# Patient Record
Sex: FEMALE | Race: WHITE | Employment: OTHER | URBAN - METROPOLITAN AREA
[De-identification: names, ages, dates, MRNs, and addresses within clinical notes are randomized per-mention and may not be internally consistent; named-entity substitution may affect disease eponyms.]

---

## 2017-03-30 ENCOUNTER — OFFICE VISIT (OUTPATIENT)
Dept: INTERNAL MEDICINE CLINIC | Age: 77
End: 2017-03-30

## 2017-03-30 VITALS
OXYGEN SATURATION: 98 % | HEART RATE: 46 BPM | WEIGHT: 153 LBS | BODY MASS INDEX: 26.89 KG/M2 | SYSTOLIC BLOOD PRESSURE: 136 MMHG | DIASTOLIC BLOOD PRESSURE: 66 MMHG

## 2017-03-30 DIAGNOSIS — M85.80 OSTEOPENIA: Chronic | ICD-10-CM

## 2017-03-30 DIAGNOSIS — J30.89 PERENNIAL ALLERGIC RHINITIS, UNSPECIFIED ALLERGIC RHINITIS TRIGGER: ICD-10-CM

## 2017-03-30 DIAGNOSIS — I10 ESSENTIAL HYPERTENSION: Primary | ICD-10-CM

## 2017-03-30 DIAGNOSIS — M89.9 DISORDER OF BONE: ICD-10-CM

## 2017-03-30 DIAGNOSIS — I10 ESSENTIAL HYPERTENSION: ICD-10-CM

## 2017-03-30 LAB
A/G RATIO: 1.7 (ref 1.1–2.2)
ALBUMIN SERPL-MCNC: 4 G/DL (ref 3.4–5)
ALP BLD-CCNC: 75 U/L (ref 40–129)
ALT SERPL-CCNC: 12 U/L (ref 10–40)
ANION GAP SERPL CALCULATED.3IONS-SCNC: 11 MMOL/L (ref 3–16)
AST SERPL-CCNC: 12 U/L (ref 15–37)
BILIRUB SERPL-MCNC: 0.4 MG/DL (ref 0–1)
BUN BLDV-MCNC: 16 MG/DL (ref 7–20)
CALCIUM SERPL-MCNC: 9.9 MG/DL (ref 8.3–10.6)
CHLORIDE BLD-SCNC: 103 MMOL/L (ref 99–110)
CO2: 29 MMOL/L (ref 21–32)
CREAT SERPL-MCNC: 0.6 MG/DL (ref 0.6–1.2)
GFR AFRICAN AMERICAN: >60
GFR NON-AFRICAN AMERICAN: >60
GLOBULIN: 2.4 G/DL
GLUCOSE BLD-MCNC: 87 MG/DL (ref 70–99)
POTASSIUM SERPL-SCNC: 4.4 MMOL/L (ref 3.5–5.1)
SODIUM BLD-SCNC: 143 MMOL/L (ref 136–145)
TOTAL PROTEIN: 6.4 G/DL (ref 6.4–8.2)

## 2017-03-30 PROCEDURE — 3017F COLORECTAL CA SCREEN DOC REV: CPT | Performed by: INTERNAL MEDICINE

## 2017-03-30 PROCEDURE — G8420 CALC BMI NORM PARAMETERS: HCPCS | Performed by: INTERNAL MEDICINE

## 2017-03-30 PROCEDURE — G8399 PT W/DXA RESULTS DOCUMENT: HCPCS | Performed by: INTERNAL MEDICINE

## 2017-03-30 PROCEDURE — 1123F ACP DISCUSS/DSCN MKR DOCD: CPT | Performed by: INTERNAL MEDICINE

## 2017-03-30 PROCEDURE — G8482 FLU IMMUNIZE ORDER/ADMIN: HCPCS | Performed by: INTERNAL MEDICINE

## 2017-03-30 PROCEDURE — 4040F PNEUMOC VAC/ADMIN/RCVD: CPT | Performed by: INTERNAL MEDICINE

## 2017-03-30 PROCEDURE — 99214 OFFICE O/P EST MOD 30 MIN: CPT | Performed by: INTERNAL MEDICINE

## 2017-03-30 PROCEDURE — 3014F SCREEN MAMMO DOC REV: CPT | Performed by: INTERNAL MEDICINE

## 2017-03-30 PROCEDURE — 1090F PRES/ABSN URINE INCON ASSESS: CPT | Performed by: INTERNAL MEDICINE

## 2017-03-30 PROCEDURE — G8427 DOCREV CUR MEDS BY ELIG CLIN: HCPCS | Performed by: INTERNAL MEDICINE

## 2017-03-30 PROCEDURE — 1036F TOBACCO NON-USER: CPT | Performed by: INTERNAL MEDICINE

## 2017-03-30 RX ORDER — LOSARTAN POTASSIUM AND HYDROCHLOROTHIAZIDE 12.5; 1 MG/1; MG/1
TABLET ORAL
Qty: 90 TABLET | Refills: 2 | Status: SHIPPED | OUTPATIENT
Start: 2017-03-30 | End: 2017-12-09 | Stop reason: SDUPTHER

## 2017-03-30 RX ORDER — ATENOLOL 100 MG/1
TABLET ORAL
Qty: 90 TABLET | Refills: 2 | Status: SHIPPED | OUTPATIENT
Start: 2017-03-30 | End: 2017-10-03

## 2017-03-31 ENCOUNTER — TELEPHONE (OUTPATIENT)
Dept: INTERNAL MEDICINE CLINIC | Age: 77
End: 2017-03-31

## 2017-07-24 ENCOUNTER — TELEPHONE (OUTPATIENT)
Dept: INTERNAL MEDICINE CLINIC | Age: 77
End: 2017-07-24

## 2017-07-24 RX ORDER — METOPROLOL SUCCINATE 100 MG/1
100 TABLET, EXTENDED RELEASE ORAL DAILY
Qty: 30 TABLET | Refills: 2 | Status: SHIPPED | OUTPATIENT
Start: 2017-07-24 | End: 2017-09-12 | Stop reason: SDUPTHER

## 2017-07-27 ENCOUNTER — TELEPHONE (OUTPATIENT)
Dept: INTERNAL MEDICINE CLINIC | Age: 77
End: 2017-07-27

## 2017-09-11 ENCOUNTER — TELEPHONE (OUTPATIENT)
Dept: INTERNAL MEDICINE CLINIC | Age: 77
End: 2017-09-11

## 2017-09-12 RX ORDER — METOPROLOL SUCCINATE 100 MG/1
100 TABLET, EXTENDED RELEASE ORAL DAILY
Qty: 90 TABLET | Refills: 1 | Status: SHIPPED | OUTPATIENT
Start: 2017-09-12 | End: 2018-03-09 | Stop reason: SDUPTHER

## 2017-10-03 ENCOUNTER — OFFICE VISIT (OUTPATIENT)
Dept: INTERNAL MEDICINE CLINIC | Age: 77
End: 2017-10-03

## 2017-10-03 VITALS
WEIGHT: 157 LBS | RESPIRATION RATE: 14 BRPM | BODY MASS INDEX: 27.82 KG/M2 | HEIGHT: 63 IN | SYSTOLIC BLOOD PRESSURE: 127 MMHG | DIASTOLIC BLOOD PRESSURE: 77 MMHG | HEART RATE: 62 BPM

## 2017-10-03 DIAGNOSIS — I10 ESSENTIAL HYPERTENSION: ICD-10-CM

## 2017-10-03 DIAGNOSIS — M85.851 OSTEOPENIA OF BOTH THIGHS: Chronic | ICD-10-CM

## 2017-10-03 DIAGNOSIS — Z12.11 SCREENING FOR COLON CANCER: ICD-10-CM

## 2017-10-03 DIAGNOSIS — I10 ESSENTIAL HYPERTENSION: Primary | ICD-10-CM

## 2017-10-03 DIAGNOSIS — M85.852 OSTEOPENIA OF BOTH THIGHS: Chronic | ICD-10-CM

## 2017-10-03 DIAGNOSIS — J30.89 NON-SEASONAL ALLERGIC RHINITIS DUE TO OTHER ALLERGIC TRIGGER: ICD-10-CM

## 2017-10-03 LAB
A/G RATIO: 1.5 (ref 1.1–2.2)
ALBUMIN SERPL-MCNC: 4 G/DL (ref 3.4–5)
ALP BLD-CCNC: 85 U/L (ref 40–129)
ALT SERPL-CCNC: 17 U/L (ref 10–40)
ANION GAP SERPL CALCULATED.3IONS-SCNC: 13 MMOL/L (ref 3–16)
AST SERPL-CCNC: 14 U/L (ref 15–37)
BILIRUB SERPL-MCNC: 0.5 MG/DL (ref 0–1)
BUN BLDV-MCNC: 19 MG/DL (ref 7–20)
CALCIUM SERPL-MCNC: 10.2 MG/DL (ref 8.3–10.6)
CHLORIDE BLD-SCNC: 101 MMOL/L (ref 99–110)
CO2: 28 MMOL/L (ref 21–32)
CREAT SERPL-MCNC: 0.7 MG/DL (ref 0.6–1.2)
GFR AFRICAN AMERICAN: >60
GFR NON-AFRICAN AMERICAN: >60
GLOBULIN: 2.6 G/DL
GLUCOSE FASTING: 89 MG/DL (ref 70–99)
POTASSIUM SERPL-SCNC: 4.7 MMOL/L (ref 3.5–5.1)
SODIUM BLD-SCNC: 142 MMOL/L (ref 136–145)
TOTAL PROTEIN: 6.6 G/DL (ref 6.4–8.2)
TSH SERPL DL<=0.05 MIU/L-ACNC: 2.04 UIU/ML (ref 0.27–4.2)

## 2017-10-03 PROCEDURE — G8399 PT W/DXA RESULTS DOCUMENT: HCPCS | Performed by: INTERNAL MEDICINE

## 2017-10-03 PROCEDURE — G8419 CALC BMI OUT NRM PARAM NOF/U: HCPCS | Performed by: INTERNAL MEDICINE

## 2017-10-03 PROCEDURE — 1090F PRES/ABSN URINE INCON ASSESS: CPT | Performed by: INTERNAL MEDICINE

## 2017-10-03 PROCEDURE — 4040F PNEUMOC VAC/ADMIN/RCVD: CPT | Performed by: INTERNAL MEDICINE

## 2017-10-03 PROCEDURE — G8484 FLU IMMUNIZE NO ADMIN: HCPCS | Performed by: INTERNAL MEDICINE

## 2017-10-03 PROCEDURE — 1123F ACP DISCUSS/DSCN MKR DOCD: CPT | Performed by: INTERNAL MEDICINE

## 2017-10-03 PROCEDURE — 3017F COLORECTAL CA SCREEN DOC REV: CPT | Performed by: INTERNAL MEDICINE

## 2017-10-03 PROCEDURE — G8427 DOCREV CUR MEDS BY ELIG CLIN: HCPCS | Performed by: INTERNAL MEDICINE

## 2017-10-03 PROCEDURE — 3014F SCREEN MAMMO DOC REV: CPT | Performed by: INTERNAL MEDICINE

## 2017-10-03 PROCEDURE — 1036F TOBACCO NON-USER: CPT | Performed by: INTERNAL MEDICINE

## 2017-10-03 PROCEDURE — 99214 OFFICE O/P EST MOD 30 MIN: CPT | Performed by: INTERNAL MEDICINE

## 2017-10-03 ASSESSMENT — PATIENT HEALTH QUESTIONNAIRE - PHQ9
SUM OF ALL RESPONSES TO PHQ QUESTIONS 1-9: 0
1. LITTLE INTEREST OR PLEASURE IN DOING THINGS: 0
SUM OF ALL RESPONSES TO PHQ9 QUESTIONS 1 & 2: 0
2. FEELING DOWN, DEPRESSED OR HOPELESS: 0

## 2017-10-03 NOTE — MR AVS SNAPSHOT
type 2 diabetes, stroke, gallstones, arthritis, sleep apnea, and certain cancers. BMI is not perfect. It may overestimate body fat in athletes and people who are more muscular. If your body fat is high you can improve your BMI by decreasing your calorie intake and becoming more physically active. Learn more at: KeepRecipes.co.uk          Instructions         Learning About the Mediterranean Diet  What is the Mediterranean diet? The Mediterranean diet is a style of eating rather than a diet plan. It features foods eaten in [place], [place], [place] and [place], and other countries along the [facility]. It emphasizes eating foods like fish, fruits, vegetables, beans, high-fiber breads and whole grains, nuts, and olive oil. This style of eating includes limited red meat, cheese, and sweets. Why choose the Mediterranean diet? A Mediterranean-style diet may improve heart health. It contains more fat than other heart-healthy diets. But the fats are mainly from nuts, unsaturated oils (such as fish oils and olive oil), and certain nut or seed oils (such as canola, soybean, or flaxseed oil). These fats may help protect the heart and blood vessels. How can you get started on the Mediterranean diet? Here are some things you can do to switch to a more Mediterranean way of eating. What to eat  · Eat a variety of fruits and vegetables each day, such as grapes, blueberries, tomatoes, broccoli, peppers, figs, olives, spinach, eggplant, beans, lentils, and chickpeas. · Eat a variety of whole-grain foods each day, such as oats, brown rice, and whole wheat bread, pasta, and couscous. · Eat fish at least 2 times a week. Try tuna, salmon, mackerel, lake trout, herring, or sardines. · Eat moderate amounts of low-fat dairy products, such as milk, cheese, or yogurt. · Eat moderate amounts of poultry and eggs. · Choose healthy (unsaturated) fats, such as nuts, olive oil, and certain nut or seed oils like canola, soybean, and flaxseed. · Limit unhealthy (saturated) fats, such as butter, palm oil, and coconut oil. And limit fats found in animal products, such as meat and dairy products made with whole milk. Try to eat red meat only a few times a month in very small amounts. · Limit sweets and desserts to only a few times a week. This includes sugar-sweetened drinks like soda. The Mediterranean diet may also include red wine with your meal1 glass each day for women and up to 2 glasses a day for men. Tips for eating at home  · Use herbs, spices, garlic, lemon zest, and citrus juice instead of salt to add flavor to foods. · Add avocado slices to your sandwich instead of gonzalez. · Have fish for lunch or dinner instead of red meat. Brush the fish with olive oil, and broil or grill it. · Sprinkle your salad with seeds or nuts instead of cheese. · Cook with olive or canola oil instead of butter or oils that are high in saturated fat. · Switch from 2% milk or whole milk to 1% or fat-free milk. · Dip raw vegetables in a vinaigrette dressing or hummus instead of dips made from mayonnaise or sour cream.  · Have a piece of fruit for dessert instead of a piece of cake. Try baked apples, or have some dried fruit. Tips for eating out  · Try broiled, grilled, baked, or poached fish instead of having it fried or breaded. · Ask your  to have your meals prepared with olive oil instead of butter. · Order dishes made with marinara sauce or sauces made from olive oil. Avoid sauces made from cream or mayonnaise. · Choose whole-grain breads, whole wheat pasta and pizza crust, brown rice, beans, and lentils. · Cut back on butter or margarine on bread. Instead, you can dip your bread in a small amount of olive oil. · Ask for a side salad or grilled vegetables instead of french fries or chips. Where can you learn more? Go to [email or web address] and sign in to your iKoat account. Enter [phone] in the [facility] box to learn more about \"Learning About the Mediterranean Diet. \"     If you do not have an account, please click on the \"Sign Up Now\" link. Current as of: December 29, 2016  Content Version: 11.3  © 3398-5156 [employer]. Care instructions adapted under license by [facility] ([facility]). If you have questions about a medical condition or this instruction, always ask your healthcare professional. [place] [address] any warranty or liability for your use of this information. Today's Medication Changes          These changes are accurate as of: 10/3/17 10:02 AM.  If you have any questions, ask your nurse or doctor. STOP taking these medications           atenolol 100 MG tablet   Commonly known as:  TENORMIN   Stopped by:  Trinidad Clifford MD               Your Current Medications Are              metoprolol succinate (TOPROL XL) 100 MG extended release tablet Take 1 tablet by mouth daily    losartan-hydrochlorothiazide (HYZAAR) 100-12.5 MG per tablet TAKE 1 TABLET EVERY DAY    cetirizine (ZYRTEC ALLERGY) 10 MG tablet Take 1 tablet by mouth daily    triamcinolone (NASACORT AQ) 55 MCG/ACT nasal inhaler 2 sprays by Nasal route daily    Cholecalciferol (VITAMIN D) 1000 UNIT TABS Take 1 tablet by mouth daily.       Allergies              Amoxicillin-pot Clavulanate Diarrhea         Additional Information        Basic Information     Date Of Birth Sex Race Ethnicity Preferred Language    1940 Female White Non-/Non  English      Problem List as of 10/3/2017  Date Reviewed: 10/3/2017                Foot cramps    Allergic rhinitis    Rash    Paresthesias    Hypertension    Osteopenia (Chronic)    Bilateral knee pain      Immunizations as of 10/3/2017     Name Date Influenza Virus Vaccine 11/30/2016, 11/14/2013, 10/20/2011, 10/28/2010    Influenza, High Dose 12/2/2015, 11/16/2012    Pneumococcal 13-valent Conjugate ([id number]) 5/14/2015    Pneumococcal Polysaccharide ([id number]) 12/15/2009    Td 4/1/2001    Tdap (Boostrix, Adacel) 10/8/2007    Zoster 10/8/2007      Preventive Care        Date Due    Colon Cancer Stool Test 7/2/2016    Osteoporosis screening or a bone density scan (Dexa) is recommended once at age 72. Based upon the results and risk factors for bone loss, your provider will recommend whether this needs to be repeated. 6/4/2017    Yearly Flu Vaccine (1) 9/1/2017    Tetanus Combination Vaccine (2 - Td) 10/8/2017    Mammograms are recommended every 2 years for low/average risk patients aged 48 - 69, and every year for high risk patients per updated national guidelines. However these guidelines can be individualized by your provider. 9/12/2018    Cholesterol Screening 7/12/2021            Query Hunter Signup           Query Hunter allows you to send messages to your doctor, view your test results, renew your prescriptions, schedule appointments, view visit notes, and more. How Do I Sign Up? 1. In your Internet browser, go to [email or web address]  2. Click on the Sign Up Now link in the Sign In box. You will see the New Member Sign Up page. 3. Enter your Query Hunter Access Code exactly as it appears below. You will not need to use this code after youve completed the sign-up process. If you do not sign up before the expiration date, you must request a new code. Query Hunter Access Code: [email or web address]  Expires: 12/2/2017 10:02 AM    4. Enter your Social Security Number ([id number]) and Date of Birth (mm/dd/yyyy) as indicated and click Submit. You will be taken to the next sign-up page. 5. Create a Query Hunter ID. This will be your Query Hunter login ID and cannot be changed, so think of one that is secure and easy to remember. 6. Create a [employer] password. You can change your password at any time. 7. Enter your Password Reset Question and Answer. This can be used at a later time if you forget your password. 8. Enter your e-mail address. You will receive e-mail notification when new information is available in [address] 9. Click Sign Up. You can now view your medical record. Additional Information  If you have questions, please contact the physician practice where you receive care. Remember, [employer] is NOT to be used for urgent needs. For medical emergencies, dial 911. For questions regarding your [employer] account call [phone]. If you have a clinical question, please call your doctor's office.

## 2017-10-03 NOTE — PATIENT INSTRUCTIONS
Learning About the [facility] Diet  What is the Mediterranean diet? The Mediterranean diet is a style of eating rather than a diet plan. It features foods eaten in [place], [place], [place] and [place], and other countries along the [facility]. It emphasizes eating foods like fish, fruits, vegetables, beans, high-fiber breads and whole grains, nuts, and olive oil. This style of eating includes limited red meat, cheese, and sweets. Why choose the Mediterranean diet? A Mediterranean-style diet may improve heart health. It contains more fat than other heart-healthy diets. But the fats are mainly from nuts, unsaturated oils (such as fish oils and olive oil), and certain nut or seed oils (such as canola, soybean, or flaxseed oil). These fats may help protect the heart and blood vessels. How can you get started on the Mediterranean diet? Here are some things you can do to switch to a more Mediterranean way of eating. What to eat  · Eat a variety of fruits and vegetables each day, such as grapes, blueberries, tomatoes, broccoli, peppers, figs, olives, spinach, eggplant, beans, lentils, and chickpeas. · Eat a variety of whole-grain foods each day, such as oats, brown rice, and whole wheat bread, pasta, and couscous. · Eat fish at least 2 times a week. Try tuna, salmon, mackerel, lake trout, herring, or sardines. · Eat moderate amounts of low-fat dairy products, such as milk, cheese, or yogurt. · Eat moderate amounts of poultry and eggs. · Choose healthy (unsaturated) fats, such as nuts, olive oil, and certain nut or seed oils like canola, soybean, and flaxseed. · Limit unhealthy (saturated) fats, such as butter, palm oil, and coconut oil. And limit fats found in animal products, such as meat and dairy products made with whole milk. Try to eat red meat only a few times a month in very small amounts. · Limit sweets and desserts to only a few times a week.  This includes sugar-sweetened drinks like soda. The Mediterranean diet may also include red wine with your meal1 glass each day for women and up to 2 glasses a day for men. Tips for eating at home  · Use herbs, spices, garlic, lemon zest, and citrus juice instead of salt to add flavor to foods. · Add avocado slices to your sandwich instead of gonzalez. · Have fish for lunch or dinner instead of red meat. Brush the fish with olive oil, and broil or grill it. · Sprinkle your salad with seeds or nuts instead of cheese. · Cook with olive or canola oil instead of butter or oils that are high in saturated fat. · Switch from 2% milk or whole milk to 1% or fat-free milk. · Dip raw vegetables in a vinaigrette dressing or hummus instead of dips made from mayonnaise or sour cream.  · Have a piece of fruit for dessert instead of a piece of cake. Try baked apples, or have some dried fruit. Tips for eating out  · Try broiled, grilled, baked, or poached fish instead of having it fried or breaded. · Ask your  to have your meals prepared with olive oil instead of butter. · Order dishes made with marinara sauce or sauces made from olive oil. Avoid sauces made from cream or mayonnaise. · Choose whole-grain breads, whole wheat pasta and pizza crust, brown rice, beans, and lentils. · Cut back on butter or margarine on bread. Instead, you can dip your bread in a small amount of olive oil. · Ask for a side salad or grilled vegetables instead of french fries or chips. Where can you learn more? Go to https://[email or web address] Developmentjose [email or web address].[email or web address]. org and sign in to your ElasticDot account. Enter [phone] in the [facility] box to learn more about \"Learning About the Mediterranean Diet. \"     If you do not have an account, please click on the \"Sign Up Now\" link. Current as of: December 29, 2016  Content Version: 11.3  © 9429-7336 [employer]pi[employer], [employer]. Care instructions adapted under license by [facility] ([facility]).  If you have questions about a medical condition or this instruction, always ask your healthcare professional. [place] [address] any warranty or liability for your use of this information.

## 2017-10-03 NOTE — PROGRESS NOTES
Assessment/Plan     1. Essential hypertension  Well-controlled with switch from Atenolol to Metoprolol- continue, with current dose of losartan/HCT. - Comprehensive Metabolic Panel, Fasting; Future  - TSH without Reflex; Future    2. Non-seasonal allergic rhinitis due to other allergic trigger  Adequately controlled on daily Zyrtec and Nasacort- continue. She will call if purulent nasal discharge or sputum develops, or if condition otherwise worsens. 3. Osteopenia of both thighs  She was reminded to schedule dexa. Continue vitamin D supplement at current dose, high calcium diet. Weight-bearing exercise encouraged. 4. Screening for colon cancer  - POCT Fecal Immunochemical Test (FIT); Future       Discussed medications with patient, who voiced understanding of their use and indications. All questions answered. Return in about 6 months (around 4/3/2018). Francisco Vega   YOB: 1940    Date of Visit:  10/3/2017    Prior to Visit Medications    Medication Sig Taking? Authorizing Provider   metoprolol succinate (TOPROL XL) 100 MG extended release tablet Take 1 tablet by mouth daily Yes Abe Aggarwal MD   losartan-hydrochlorothiazide (HYZAAR) 100-12.5 MG per tablet TAKE 1 TABLET EVERY DAY Yes Abe Aggarwal MD   cetirizine (ZYRTEC ALLERGY) 10 MG tablet Take 1 tablet by mouth daily Yes Abe Aggarwal MD   triamcinolone (NASACORT AQ) 55 MCG/ACT nasal inhaler 2 sprays by Nasal route daily Yes Abe Aggarwal MD   Cholecalciferol (VITAMIN D) 1000 UNIT TABS Take 1 tablet by mouth daily. Yes Historical Provider, MD   atenolol (TENORMIN) 100 MG tablet TAKE 1 TABLET EVERY DAY  Abe Aggarwal MD      Vitals:    10/03/17 0929   BP: 127/77   Pulse: 62   Resp: 14   Weight: 157 lb (71.2 kg)   Height: 5' 3.25\" (1.607 m)     Body mass index is 27.59 kg/(m^2).      Wt Readings from Last 3 Encounters:   10/03/17 157 lb (71.2 kg)   03/30/17 153 lb (69.4 kg)   09/29/16 159 lb (72.1 kg) BP Readings from Last 3 Encounters:   10/03/17 127/77   17 136/66   16 136/80       CC:  Patient presents for follow-up of   1. Essential hypertension    2. Non-seasonal allergic rhinitis due to other allergic trigger    3. Osteopenia of both thighs         [facility]  Hypertension:  Home blood pressure monitorins/70s. She is adherent to a low sodium diet. Patient denies chest pain, shortness of breath, lightheadedness and palpitations. Antihypertensive medication side effects: none. Use of agents associated with hypertension: NSAIDS- occasional ibuprofen. Has gained 4 pounds through dietary indiscretion. Sodium (mmol/L)   Date Value   2017 143    BUN (mg/dL)   Date Value   2017 16    Glucose (mg/dL)   Date Value   2017 87      Potassium (mmol/L)   Date Value   2017 4.4    CREATININE (mg/dL)   Date Value   2017 0.6         Allergic Rhinitis:  Current treatment includes intranasal steroid- Nasacort AQ, oral antihistamine- Zyrtec. Residual symptoms: occasional sinus pressure, but cough resolved. She denies purulent nasal discharge and sputum, fevers, lymphadenopathy, and sore throat. Osteopenia:  Getting 1200 mg calcium/day in diet, taking 1000 IU/day vitamin D3. No significant weight-bearing exercise currently. Fosamax stopped in  to allow for remodeling- no medication since then. Review of Systems  As documented in HPI    Physical Exam   Constitutional: She is oriented to person, place, and time. She appears well-developed and well-nourished. No distress. HENT:   Right Ear: Tympanic membrane, external ear and ear canal normal.   Left Ear: Tympanic membrane, external ear and ear canal normal.   Mouth/Throat: Oropharynx is clear and moist and mucous membranes are normal. No oropharyngeal exudate or posterior oropharyngeal erythema.    Eyes: Conjunctivae are normal.   Cardiovascular: Normal rate, regular rhythm, normal

## 2017-10-19 ENCOUNTER — HOSPITAL ENCOUNTER (OUTPATIENT)
Dept: GENERAL RADIOLOGY | Age: 77
Discharge: HOME | End: 2017-10-19
Attending: INTERNAL MEDICINE | Admitting: INTERNAL MEDICINE

## 2017-10-19 DIAGNOSIS — M89.9 DISORDER OF BONE: ICD-10-CM

## 2017-10-19 DIAGNOSIS — M85.80 OSTEOPENIA: Chronic | ICD-10-CM

## 2017-10-19 DIAGNOSIS — Z13.820 ENCOUNTER FOR IMAGING TO ASSESS OSTEOPOROSIS: ICD-10-CM

## 2017-12-09 DIAGNOSIS — I10 ESSENTIAL HYPERTENSION: ICD-10-CM

## 2017-12-11 RX ORDER — LOSARTAN POTASSIUM AND HYDROCHLOROTHIAZIDE 12.5; 1 MG/1; MG/1
TABLET ORAL
Qty: 90 TABLET | Refills: 1 | Status: SHIPPED | OUTPATIENT
Start: 2017-12-11 | End: 2018-03-09 | Stop reason: SDUPTHER

## 2018-03-09 DIAGNOSIS — I10 ESSENTIAL HYPERTENSION: ICD-10-CM

## 2018-03-09 RX ORDER — LOSARTAN POTASSIUM AND HYDROCHLOROTHIAZIDE 12.5; 1 MG/1; MG/1
TABLET ORAL
Qty: 90 TABLET | Refills: 1 | Status: SHIPPED | OUTPATIENT
Start: 2018-03-09 | End: 2018-09-04 | Stop reason: SDUPTHER

## 2018-03-09 RX ORDER — METOPROLOL SUCCINATE 100 MG/1
100 TABLET, EXTENDED RELEASE ORAL DAILY
Qty: 90 TABLET | Refills: 1 | Status: SHIPPED | OUTPATIENT
Start: 2018-03-09 | End: 2018-09-04 | Stop reason: SDUPTHER

## 2018-04-05 ENCOUNTER — OFFICE VISIT (OUTPATIENT)
Dept: INTERNAL MEDICINE CLINIC | Age: 78
End: 2018-04-05

## 2018-04-05 VITALS
HEIGHT: 63 IN | WEIGHT: 165 LBS | DIASTOLIC BLOOD PRESSURE: 80 MMHG | HEART RATE: 68 BPM | SYSTOLIC BLOOD PRESSURE: 136 MMHG | BODY MASS INDEX: 29.23 KG/M2

## 2018-04-05 DIAGNOSIS — Z23 NEED FOR PROPHYLACTIC VACCINATION AND INOCULATION AGAINST VARICELLA: ICD-10-CM

## 2018-04-05 DIAGNOSIS — Z00.00 ROUTINE GENERAL MEDICAL EXAMINATION AT A HEALTH CARE FACILITY: ICD-10-CM

## 2018-04-05 PROCEDURE — G0439 PPPS, SUBSEQ VISIT: HCPCS | Performed by: INTERNAL MEDICINE

## 2018-04-05 PROCEDURE — 4040F PNEUMOC VAC/ADMIN/RCVD: CPT | Performed by: INTERNAL MEDICINE

## 2018-04-05 RX ORDER — CALCIUM CARBONATE 500(1250)
500 TABLET ORAL DAILY
COMMUNITY
End: 2019-08-08 | Stop reason: ALTCHOICE

## 2018-04-05 ASSESSMENT — LIFESTYLE VARIABLES: HOW OFTEN DO YOU HAVE A DRINK CONTAINING ALCOHOL: 0

## 2018-04-05 ASSESSMENT — PATIENT HEALTH QUESTIONNAIRE - PHQ9: SUM OF ALL RESPONSES TO PHQ QUESTIONS 1-9: 0

## 2018-04-05 ASSESSMENT — ANXIETY QUESTIONNAIRES: GAD7 TOTAL SCORE: 0

## 2018-05-29 ENCOUNTER — TELEPHONE (OUTPATIENT)
Dept: INTERNAL MEDICINE CLINIC | Age: 78
End: 2018-05-29

## 2018-05-29 DIAGNOSIS — R05.9 COUGH: Primary | ICD-10-CM

## 2018-05-29 RX ORDER — GUAIFENESIN AND CODEINE PHOSPHATE 100; 10 MG/5ML; MG/5ML
10 SOLUTION ORAL NIGHTLY PRN
Qty: 118 ML | Refills: 0 | Status: SHIPPED | OUTPATIENT
Start: 2018-05-29 | End: 2018-06-05

## 2018-06-28 ENCOUNTER — OFFICE VISIT (OUTPATIENT)
Dept: INTERNAL MEDICINE CLINIC | Age: 78
End: 2018-06-28

## 2018-06-28 VITALS
WEIGHT: 165 LBS | BODY MASS INDEX: 29 KG/M2 | SYSTOLIC BLOOD PRESSURE: 130 MMHG | HEART RATE: 58 BPM | DIASTOLIC BLOOD PRESSURE: 80 MMHG

## 2018-06-28 DIAGNOSIS — I10 ESSENTIAL HYPERTENSION: Primary | ICD-10-CM

## 2018-06-28 DIAGNOSIS — J30.89 CHRONIC NONSEASONAL ALLERGIC RHINITIS DUE TO OTHER ALLERGEN: ICD-10-CM

## 2018-06-28 DIAGNOSIS — Z12.11 SCREENING FOR COLON CANCER: ICD-10-CM

## 2018-06-28 DIAGNOSIS — M85.852 OSTEOPENIA OF BOTH THIGHS: Chronic | ICD-10-CM

## 2018-06-28 DIAGNOSIS — M85.851 OSTEOPENIA OF BOTH THIGHS: Chronic | ICD-10-CM

## 2018-06-28 LAB
CONTROL: NORMAL
HEMOCCULT STL QL: NEGATIVE

## 2018-06-28 PROCEDURE — G8399 PT W/DXA RESULTS DOCUMENT: HCPCS | Performed by: INTERNAL MEDICINE

## 2018-06-28 PROCEDURE — 3017F COLORECTAL CA SCREEN DOC REV: CPT | Performed by: INTERNAL MEDICINE

## 2018-06-28 PROCEDURE — 99214 OFFICE O/P EST MOD 30 MIN: CPT | Performed by: INTERNAL MEDICINE

## 2018-06-28 PROCEDURE — G8427 DOCREV CUR MEDS BY ELIG CLIN: HCPCS | Performed by: INTERNAL MEDICINE

## 2018-06-28 PROCEDURE — 82274 ASSAY TEST FOR BLOOD FECAL: CPT | Performed by: INTERNAL MEDICINE

## 2018-06-28 PROCEDURE — 1036F TOBACCO NON-USER: CPT | Performed by: INTERNAL MEDICINE

## 2018-06-28 PROCEDURE — 1123F ACP DISCUSS/DSCN MKR DOCD: CPT | Performed by: INTERNAL MEDICINE

## 2018-06-28 PROCEDURE — 1090F PRES/ABSN URINE INCON ASSESS: CPT | Performed by: INTERNAL MEDICINE

## 2018-06-28 PROCEDURE — G8419 CALC BMI OUT NRM PARAM NOF/U: HCPCS | Performed by: INTERNAL MEDICINE

## 2018-06-28 PROCEDURE — 4040F PNEUMOC VAC/ADMIN/RCVD: CPT | Performed by: INTERNAL MEDICINE

## 2018-06-28 RX ORDER — ALENDRONATE SODIUM 70 MG/1
70 TABLET ORAL
Qty: 4 TABLET | Refills: 5 | Status: SHIPPED | OUTPATIENT
Start: 2018-06-28 | End: 2018-12-06 | Stop reason: SDUPTHER

## 2018-06-29 ENCOUNTER — TELEPHONE (OUTPATIENT)
Dept: INTERNAL MEDICINE CLINIC | Age: 78
End: 2018-06-29

## 2018-06-29 NOTE — TELEPHONE ENCOUNTER
Pt is returning Rosaura's call. Please mail results to pt's home address. Pt unable to get into [employer].

## 2018-09-04 DIAGNOSIS — I10 ESSENTIAL HYPERTENSION: ICD-10-CM

## 2018-09-04 RX ORDER — LOSARTAN POTASSIUM AND HYDROCHLOROTHIAZIDE 12.5; 1 MG/1; MG/1
TABLET ORAL
Qty: 90 TABLET | Refills: 1 | Status: SHIPPED | OUTPATIENT
Start: 2018-09-04 | End: 2019-02-23 | Stop reason: SDUPTHER

## 2018-09-04 RX ORDER — METOPROLOL SUCCINATE 100 MG/1
100 TABLET, EXTENDED RELEASE ORAL DAILY
Qty: 90 TABLET | Refills: 1 | Status: SHIPPED | OUTPATIENT
Start: 2018-09-04 | End: 2019-02-23 | Stop reason: SDUPTHER

## 2018-12-06 RX ORDER — ALENDRONATE SODIUM 70 MG/1
TABLET ORAL
Qty: 4 TABLET | Refills: 5 | Status: SHIPPED | OUTPATIENT
Start: 2018-12-06 | End: 2018-12-20 | Stop reason: SDUPTHER

## 2018-12-20 ENCOUNTER — OFFICE VISIT (OUTPATIENT)
Dept: INTERNAL MEDICINE CLINIC | Age: 78
End: 2018-12-20
Payer: MEDICARE

## 2018-12-20 VITALS
WEIGHT: 163 LBS | OXYGEN SATURATION: 98 % | SYSTOLIC BLOOD PRESSURE: 128 MMHG | DIASTOLIC BLOOD PRESSURE: 76 MMHG | HEART RATE: 62 BPM | BODY MASS INDEX: 28.65 KG/M2

## 2018-12-20 DIAGNOSIS — M85.852 OSTEOPENIA OF BOTH THIGHS: ICD-10-CM

## 2018-12-20 DIAGNOSIS — M85.851 OSTEOPENIA OF BOTH THIGHS: ICD-10-CM

## 2018-12-20 DIAGNOSIS — R20.2 PARESTHESIAS: ICD-10-CM

## 2018-12-20 DIAGNOSIS — I10 ESSENTIAL HYPERTENSION: Primary | ICD-10-CM

## 2018-12-20 PROCEDURE — 1101F PT FALLS ASSESS-DOCD LE1/YR: CPT | Performed by: INTERNAL MEDICINE

## 2018-12-20 PROCEDURE — 99214 OFFICE O/P EST MOD 30 MIN: CPT | Performed by: INTERNAL MEDICINE

## 2018-12-20 PROCEDURE — G8482 FLU IMMUNIZE ORDER/ADMIN: HCPCS | Performed by: INTERNAL MEDICINE

## 2018-12-20 PROCEDURE — G8399 PT W/DXA RESULTS DOCUMENT: HCPCS | Performed by: INTERNAL MEDICINE

## 2018-12-20 PROCEDURE — 4040F PNEUMOC VAC/ADMIN/RCVD: CPT | Performed by: INTERNAL MEDICINE

## 2018-12-20 PROCEDURE — 1036F TOBACCO NON-USER: CPT | Performed by: INTERNAL MEDICINE

## 2018-12-20 PROCEDURE — G8427 DOCREV CUR MEDS BY ELIG CLIN: HCPCS | Performed by: INTERNAL MEDICINE

## 2018-12-20 PROCEDURE — 1123F ACP DISCUSS/DSCN MKR DOCD: CPT | Performed by: INTERNAL MEDICINE

## 2018-12-20 PROCEDURE — G8419 CALC BMI OUT NRM PARAM NOF/U: HCPCS | Performed by: INTERNAL MEDICINE

## 2018-12-20 PROCEDURE — 3017F COLORECTAL CA SCREEN DOC REV: CPT | Performed by: INTERNAL MEDICINE

## 2018-12-20 PROCEDURE — 1090F PRES/ABSN URINE INCON ASSESS: CPT | Performed by: INTERNAL MEDICINE

## 2018-12-20 RX ORDER — ALENDRONATE SODIUM 70 MG/1
TABLET ORAL
Qty: 12 TABLET | Refills: 1 | Status: SHIPPED | OUTPATIENT
Start: 2018-12-20 | End: 2019-04-08

## 2018-12-20 NOTE — PATIENT INSTRUCTIONS
The medication list included in this document is our record of what you are currently taking, including any changes that were made at today's visit. If you find any differences when compared to your medications at home, or have any questions that were not answered at your visit, please contact the office.

## 2018-12-20 NOTE — PROGRESS NOTES
and well-nourished. No distress. HENT:   Mouth/Throat: Oropharynx is clear and moist and mucous membranes are normal.   Eyes: Conjunctivae are normal.   Neck: No thyroid mass and no thyromegaly present. Cardiovascular: Normal rate, regular rhythm and normal heart sounds. Exam reveals no gallop and no friction rub. No murmur heard. Pulses:       Dorsalis pedis pulses are 1+ on the right side, and 1+ on the left side. Posterior tibial pulses are 1+ on the right side, and 1+ on the left side. Pulmonary/Chest: Effort normal and breath sounds normal. No respiratory distress. She has no wheezes. She has no rhonchi. She has no rales. Musculoskeletal: She exhibits no edema. Feet:   Right Foot:   Protective Sensation: 5 sites tested. 5 sites sensed. Left Foot:   Protective Sensation: 5 sites tested. 5 sites sensed. Lymphadenopathy:     She has no cervical adenopathy. Neurological: She is alert and oriented to person, place, and time. Skin: Skin is warm, dry and intact. No rash noted. No erythema. Psychiatric: She has a normal mood and affect.  Her speech is normal and behavior is normal. Judgment and thought content normal. Cognition and memory are normal.

## 2018-12-24 ENCOUNTER — TELEPHONE (OUTPATIENT)
Dept: INTERNAL MEDICINE CLINIC | Age: 78
End: 2018-12-24

## 2019-02-23 DIAGNOSIS — I10 ESSENTIAL HYPERTENSION: ICD-10-CM

## 2019-02-25 RX ORDER — METOPROLOL SUCCINATE 100 MG/1
TABLET, EXTENDED RELEASE ORAL
Qty: 90 TABLET | Refills: 1 | Status: SHIPPED | OUTPATIENT
Start: 2019-02-25 | End: 2019-08-16 | Stop reason: SDUPTHER

## 2019-02-25 RX ORDER — LOSARTAN POTASSIUM AND HYDROCHLOROTHIAZIDE 12.5; 1 MG/1; MG/1
TABLET ORAL
Qty: 90 TABLET | Refills: 1 | Status: SHIPPED | OUTPATIENT
Start: 2019-02-25 | End: 2019-08-16 | Stop reason: SDUPTHER

## 2019-04-08 RX ORDER — ALENDRONATE SODIUM 70 MG/1
TABLET ORAL
Qty: 4 TABLET | Refills: 5 | Status: SHIPPED | OUTPATIENT
Start: 2019-04-08 | End: 2020-02-11

## 2019-06-13 ENCOUNTER — OFFICE VISIT (OUTPATIENT)
Dept: INTERNAL MEDICINE CLINIC | Age: 79
End: 2019-06-13
Payer: MEDICARE

## 2019-06-13 VITALS
HEIGHT: 63 IN | HEART RATE: 57 BPM | OXYGEN SATURATION: 98 % | DIASTOLIC BLOOD PRESSURE: 76 MMHG | SYSTOLIC BLOOD PRESSURE: 138 MMHG | WEIGHT: 162 LBS | BODY MASS INDEX: 28.7 KG/M2

## 2019-06-13 DIAGNOSIS — E53.8 B12 DEFICIENCY: ICD-10-CM

## 2019-06-13 DIAGNOSIS — R20.2 PARESTHESIAS: ICD-10-CM

## 2019-06-13 DIAGNOSIS — M25.562 CHRONIC PAIN OF BOTH KNEES: ICD-10-CM

## 2019-06-13 DIAGNOSIS — G89.29 CHRONIC PAIN OF BOTH KNEES: ICD-10-CM

## 2019-06-13 DIAGNOSIS — I10 ESSENTIAL HYPERTENSION: Primary | ICD-10-CM

## 2019-06-13 DIAGNOSIS — M25.561 CHRONIC PAIN OF BOTH KNEES: ICD-10-CM

## 2019-06-13 DIAGNOSIS — J30.89 CHRONIC NON-SEASONAL ALLERGIC RHINITIS: ICD-10-CM

## 2019-06-13 DIAGNOSIS — E21.3 HYPERPARATHYROIDISM (HCC): ICD-10-CM

## 2019-06-13 DIAGNOSIS — E83.52 SERUM CALCIUM ELEVATED: Primary | ICD-10-CM

## 2019-06-13 PROCEDURE — G8399 PT W/DXA RESULTS DOCUMENT: HCPCS | Performed by: INTERNAL MEDICINE

## 2019-06-13 PROCEDURE — 1123F ACP DISCUSS/DSCN MKR DOCD: CPT | Performed by: INTERNAL MEDICINE

## 2019-06-13 PROCEDURE — 1036F TOBACCO NON-USER: CPT | Performed by: INTERNAL MEDICINE

## 2019-06-13 PROCEDURE — 1090F PRES/ABSN URINE INCON ASSESS: CPT | Performed by: INTERNAL MEDICINE

## 2019-06-13 PROCEDURE — G8419 CALC BMI OUT NRM PARAM NOF/U: HCPCS | Performed by: INTERNAL MEDICINE

## 2019-06-13 PROCEDURE — 99214 OFFICE O/P EST MOD 30 MIN: CPT | Performed by: INTERNAL MEDICINE

## 2019-06-13 PROCEDURE — G8427 DOCREV CUR MEDS BY ELIG CLIN: HCPCS | Performed by: INTERNAL MEDICINE

## 2019-06-13 PROCEDURE — 4040F PNEUMOC VAC/ADMIN/RCVD: CPT | Performed by: INTERNAL MEDICINE

## 2019-06-13 ASSESSMENT — PATIENT HEALTH QUESTIONNAIRE - PHQ9
SUM OF ALL RESPONSES TO PHQ QUESTIONS 1-9: 0
1. LITTLE INTEREST OR PLEASURE IN DOING THINGS: 0
SUM OF ALL RESPONSES TO PHQ QUESTIONS 1-9: 0
SUM OF ALL RESPONSES TO PHQ9 QUESTIONS 1 & 2: 0
2. FEELING DOWN, DEPRESSED OR HOPELESS: 0

## 2019-06-13 NOTE — PROGRESS NOTES
Assessment/Plan     1. Essential hypertension  Well-controlled. Continue current medications. - Comprehensive Metabolic Panel, Fasting; Future    2. Chronic non-seasonal allergic rhinitis  Residual symptoms do not bother her enough to warrant additional medication, but will add Astelin nasal spray if symptoms worsen. 3. Paresthesias  No improvement with B12 supplementation. Continue to feel that this is likely due to nerve compression in lumbar spine. This does not bother her enough to warrant further work-up, but she will follow up if symptoms worsen. 4. B12 deficiency  Check vitamin B12 level on current dose of supplement. - Vitamin B12; Future    5. Chronic pain of both knees  Referred to ortho for further evaluation and treatment. - Rio Alamo MD, Orthopedic Surgery (Sports), [place]          Return in about 6 months (around 12/13/2019). Adri Childers   YOB: 1940    Date of Visit:  6/13/2019    Allergies   Allergen Reactions    Amoxicillin-Pot Clavulanate Diarrhea      Prior to Visit Medications    Medication Sig Taking? Authorizing Provider   alendronate (FOSAMAX) 70 MG tablet TAKE 1 TABLET EVERY 7 DAYS WITH WATER, 30 MINUTES BEFORE FIRST MEAL, REMAIN UPRIGHT FOR 30 MINUTES Yes iLsa Magaña MD   metoprolol succinate (TOPROL XL) 100 MG extended release tablet TAKE 1 TABLET BY MOUTH EVERY DAY Yes Lisa Magaña MD   losartan-hydrochlorothiazide (HYZAAR) 100-12.5 MG per tablet TAKE 1 TABLET BY MOUTH EVERY DAY Yes Lisa Magaña MD   calcium carbonate (OSCAL) 500 MG TABS tablet Take 500 mg by mouth daily Yes Historical Provider, MD   cetirizine (ZYRTEC ALLERGY) 10 MG tablet Take 1 tablet by mouth daily Yes Lisa Magaña MD   triamcinolone (NASACORT AQ) 55 MCG/ACT nasal inhaler 2 sprays by Nasal route daily Yes Lisa Magaña MD   Cholecalciferol (VITAMIN D) 1000 UNIT TABS Take 1 tablet by mouth daily.  Yes Historical Provider, MD Vitals:    19 0804   BP: 138/76   Pulse: 57   SpO2: 98%   Weight: 162 lb (73.5 kg)   Height: 5' 3\" (1.6 m)      Body mass index is 28.7 kg/m². Wt Readings from Last 3 Encounters:   19 162 lb (73.5 kg)   18 163 lb (73.9 kg)   18 165 lb (74.8 kg)     BP Readings from Last 3 Encounters:   19 138/76   18 128/76   18 130/80       CC:  Patient presents for re-evaluation of the following medical concerns     HPI  Hypertension:  Home blood pressure monitorins/70-80. She is adherent to a low sodium diet. Patient denies chest pain, shortness of breath, dizziness and palpitations. Antihypertensive medication side effects: none. Use of agents associated with hypertension: NSAIDS- occasional ibuprofen. Weight stable.     Allergic Rhinitis:  Current treatment includes intranasal steroid- Nasacort, oral antihistamine- Zyrtec. Residual symptoms: cough, nasal congestion and sneezing. She denies purulent nasal discharge and sputum, fevers, lymphadenopathy, and sore throat. Paresthesias:  Ongoing paresthesias on dorsum of feet with occasional foot cramps at night- much better with increased fluid intake and stretching. Vitamin B12 level low - now taking 1000 mcg B12/day- no clinical change.     Lab Results   Component Value Date    LDLCALC 107 (H) 2016    LDLCALC 108 (H) 2012    TRIG 101 2016    HDL 72 (H) 2016     Lab Results   Component Value Date    GLUF 87 2018    GLUCOSE 87 2017     Lab Results   Component Value Date     2018    K 4.5 2018    BUN 17 2018    CREATININE 0.6 2018    LABGLOM >60 2018    GFRAA >60 2018    CALCIUM 10.4 2018    VITD25 31 05/15/2014     Lab Results   Component Value Date    ALT 13 2018    AST 11 (L) 2018     Lab Results   Component Value Date    HGB 14.3 2016     Lab Results   Component Value Date    TSHREFLEX 1.84 2018    TSH 2.04 10/03/2017        Review of Systems  As documented inHPI  Several month history of bilateral knee pain R > L. Worse when walking up stairs. Getting worse with time. Taking 600 mg ibuprofen 2-3x/week or Tylenol. Elijah Blanco in February- hit right knee on wooden floor. Social History     Tobacco Use    Smoking status: Never Smoker    Smokeless tobacco: Never Used   Substance Use Topics    Alcohol use: No        Physical Exam   Constitutional: She is oriented to person, place, and time. She appears well-developed and well-nourished. No distress. HENT:   Right Ear: Tympanic membrane, external ear and ear canal normal.   Left Ear: Tympanic membrane, external ear and ear canal normal.   Mouth/Throat: Oropharynx is clear and moist and mucous membranes are normal. No oropharyngeal exudate or posterior oropharyngeal erythema. Eyes: Conjunctivae are normal.   Cardiovascular: Normal rate, regular rhythm and intact distal pulses. Exam reveals no gallop and no friction rub. Murmur heard. Systolic (RUSB) murmur is present with a grade of 2/6. Pulses:       Dorsalis pedis pulses are 1+ on the right side, and 1+ on the left side. Posterior tibial pulses are 1+ on the right side, and 1+ on the left side. Good cap refill   Pulmonary/Chest: Effort normal and breath sounds normal. No respiratory distress. She has no wheezes. She has no rhonchi. She has no rales. Musculoskeletal: She exhibits no edema. Bilateral knee:  Positive mild crepitus, no effusion, no erythema, no tenderness and FROM. Distal neurovascular exam normal.    Lymphadenopathy:     She has no cervical adenopathy. Neurological: She is alert and oriented to person, place, and time. Gait normal.   Skin: Skin is warm, dry and intact. Psychiatric: She has a normal mood and affect.  Her speech is normal and behavior is normal. Judgment and thought content normal. Cognition and memory are normal.

## 2019-06-21 ENCOUNTER — OFFICE VISIT (OUTPATIENT)
Dept: ORTHOPEDIC SURGERY | Age: 79
End: 2019-06-21
Payer: MEDICARE

## 2019-06-21 VITALS — HEIGHT: 63 IN | WEIGHT: 160 LBS | RESPIRATION RATE: 16 BRPM | BODY MASS INDEX: 28.35 KG/M2

## 2019-06-21 DIAGNOSIS — M25.561 RIGHT KNEE PAIN, UNSPECIFIED CHRONICITY: Primary | ICD-10-CM

## 2019-06-21 DIAGNOSIS — M17.11 PRIMARY OSTEOARTHRITIS OF RIGHT KNEE: ICD-10-CM

## 2019-06-21 PROCEDURE — 99213 OFFICE O/P EST LOW 20 MIN: CPT | Performed by: ORTHOPAEDIC SURGERY

## 2019-06-21 PROCEDURE — 1090F PRES/ABSN URINE INCON ASSESS: CPT | Performed by: ORTHOPAEDIC SURGERY

## 2019-06-21 PROCEDURE — G8419 CALC BMI OUT NRM PARAM NOF/U: HCPCS | Performed by: ORTHOPAEDIC SURGERY

## 2019-06-21 PROCEDURE — G8428 CUR MEDS NOT DOCUMENT: HCPCS | Performed by: ORTHOPAEDIC SURGERY

## 2019-06-21 NOTE — PROGRESS NOTES
Chief Complaint    Knee Problem (right knee )      History of Present Illness:  Cely Vidales is a 70 y.o. female. She is here for consultation for her right knee at the referral of Dr. Anurag Mendoza. She states that several weeks ago she was walking up her steps and she had a sharp pain over the anterior aspect of the right knee. She denies any swelling or locking or catching that happened after the sharp pain came on. She states her knee was sore for several days after but is improved significantly since then. She states that she has had intermittent pain within both of the knees but never pain similar to this. She denies any prior history of injury to the knees. Occasionally will use Aleve or ibuprofen to treat the knees. Medical History:  Patient's medications, allergies, past medical, surgical, social and family histories were reviewed and updated as appropriate. Review of Systems:  Pertinent items are noted in HPI  Review of systems reviewed from Patient History Form dated on 6/21/19 and available in the patient's chart under the Media tab. Vital Signs:  Resp 16   Ht 5' 3\" (1.6 m)   Wt 160 lb (72.6 kg)   BMI 28.34 kg/m²     General Exam:   Constitutional: Patient is adequately groomed with no evidence of malnutrition  DTRs: Deep tendon reflexes are intact  Mental Status: The patient is oriented to time, place and person. The patient's mood and affect are appropriate. Knee Examination:    Inspection: No significant swelling erythema noted about the right knee today    Palpation: There is some tenderness over the anterior medial joint line of the right knee. No lateral joint line tenderness. Range of Motion: 0 degrees extension with 130 degrees of flexion    Strength: Able to do straight leg raise    Special Tests: Negative Lachman exam.  No instability to varus and valgus stress. Negative posterior drawer.   There is some pain with Dainel exam    Skin: There are no rashes, ulcerations or lesions. Gait: Walking with normal gait      Additional Comments:       Additional Examinations:         Left Lower Extremity: Examination of the left lower extremity does not show any tenderness, deformity or injury. Range of motion is unremarkable. There is no gross instability. There are no rashes, ulcerations or lesions. Strength and tone are normal.    Radiology:     X-rays obtained and reviewed in office:  Views 4 views of the right knee does demonstrate some mild degenerative change primarily within the medial patellofemoral compartments with some very mild joint space loss and osteophyte formation. No evidence of fracture dislocation         Assessment : Right knee osteoarthritis    Impression:  Encounter Diagnoses   Name Primary?  Right knee pain, unspecified chronicity Yes    Primary osteoarthritis of right knee        Office Procedures:  Orders Placed This Encounter   Procedures    XR KNEE RIGHT (MIN 4 VIEWS)     Standing Status:   Future     Number of Occurrences:   1     Standing Expiration Date:   6/21/2020    Ambulatory referral to Physical Therapy     Referral Priority:   Routine     Referral Type:   Eval and Treat     Referral Reason:   Specialty Services Required     Requested Specialty:   Physical Therapy     Number of Visits Requested:   1       Treatment Plan: I discussed the diagnosis and treatment options with her today. She does appear to have some mild patellofemoral arthritis which I do believe is what is causing her pain. I would recommend at this time referral to physical therapy for patellar protection program.  She is agreeable with that plan. She may continue to use ibuprofen or Aleve as needed. I would like to see her back in clinic in 4 weeks to make sure she does get improvement. We have discussed cortisone injection at this point time she would like to hold off on that.

## 2019-07-02 ENCOUNTER — HOSPITAL ENCOUNTER (OUTPATIENT)
Dept: PHYSICAL THERAPY | Age: 79
Setting detail: THERAPIES SERIES
Discharge: HOME | End: 2019-07-02
Payer: MEDICARE

## 2019-07-02 PROCEDURE — 97110 THERAPEUTIC EXERCISES: CPT | Performed by: PHYSICAL THERAPIST

## 2019-07-02 PROCEDURE — 97161 PT EVAL LOW COMPLEX 20 MIN: CPT | Performed by: PHYSICAL THERAPIST

## 2019-07-02 NOTE — FLOWSHEET NOTE
proximal hip, and core control with self care, mobility, lifting, ambulation.  [] (32728) Provided verbal/tactile cueing for activities related to improving balance, coordination, kinesthetic sense, posture, motor skill, proprioception  to assist with LE, proximal hip, and core control in self care, mobility, lifting, ambulation and eccentric single leg control. NMR and Therapeutic Activities:    [] (82691 or 60704) Provided verbal/tactile cueing for activities related to improving balance, coordination, kinesthetic sense, posture, motor skill, proprioception and motor activation to allow for proper function of core, proximal hip and LE with self care and ADLs  [] (53326) Gait Re-education- Provided training and instruction to the patient for proper LE, core and proximal hip recruitment and positioning and eccentric body weight control with ambulation re-education including up and down stairs     Home Exercise Program:    [x] (30187) Reviewed/Progressed HEP activities related to strengthening, flexibility, endurance, ROM of core, proximal hip and LE for functional self-care, mobility, lifting and ambulation/stair navigation   [] (13748)Reviewed/Progressed HEP activities related to improving balance, coordination, kinesthetic sense, posture, motor skill, proprioception of core, proximal hip and LE for self care, mobility, lifting, and ambulation/stair navigation      Manual Treatments:  PROM / Scar Mobs / STM/Rollerstick / Knee (Flex./Ext.)  Stretch: H.S. / ITB / Piriformis / Gabbi Cypher / Groin / Hip Flexor   [] (35146) Provided manual therapy to mobilize LE, proximal hip and/or LS spine soft tissue/joints for the purpose of modulating pain, promoting relaxation,  increasing ROM, reducing/eliminating soft tissue swelling/inflammation/restriction, improving soft tissue extensibility and allowing for proper ROM for normal function with self care, mobility, lifting and ambulation.      Modalities:    Hx of cancer    Charges:  Timed Code Treatment Minutes: 15   Total Treatment Minutes: 35     [x] EVAL (LOW) 68363 (typically 20 minutes face-to-face)  [] EVAL (MOD) 54221 (typically 30 minutes face-to-face)  [] EVAL (HIGH) 36348 (typically 45 minutes face-to-face)  [] RE-EVAL     [x] EM(18310) x 1    [] IONTO  [] NMR (16844) x     [] VASO  [] Manual (91840) x      [] Other:  [] TA x      [] Mech Traction (31034)  [] ES(attended) (47545)      [] ES (un) (59427):     GOALS:   Short Term Goals: To be achieved in: 2 weeks  1. Independent in HEP and progression per patient tolerance, in order to prevent re-injury. 2. Patient will have a decrease in pain to facilitate improvement in movement, function, and ADLs as indicated by Functional Deficits. Long Term Goals: To be achieved in: 5 weeks  1. Disability index score of 25% or less for the LEFS to assist with reaching prior level of function. 2. Patient will demonstrate an increase in Strength to 5/5 knee ext/hip abd to allow for proper functional mobility as indicated by patients Functional Deficits. 3. Patient will return to walking up/down one flight of stairs without increased symptoms or restriction. Progression Towards Functional goals:  [] Patient is progressing as expected towards functional goals listed. [] Progression is slowed due to complexities listed. [] Progression has been slowed due to co-morbidities.   [x] Plan just implemented, too soon to assess goals progression  [] Other:     ASSESSMENT:  See eval    Treatment/Activity Tolerance:  [x] Patient tolerated treatment well [] Patient limited by fatique  [] Patient limited by pain  [] Patient limited by other medical complications  [] Other:     Prognosis: [x] Good [] Fair  [] Poor    Patient Requires Follow-up: [x] Yes  [] No    PLAN FOR NEXT SESSION:     PLAN: See eval  [] Continue per plan of care [] Alter current plan (see comments)  [x] Plan of care initiated [] Hold pending MD visit []

## 2019-07-02 NOTE — PLAN OF CARE
less than 40% impairment, limitation or restriction in:   - walking and moving around (mobility)     Therapist goals for Patient:   Short Term Goals: To be achieved in: 2 weeks  1. Independent in HEP and progression per patient tolerance, in order to prevent re-injury. 2. Patient will have a decrease in pain to facilitate improvement in movement, function, and ADLs as indicated by Functional Deficits. Long Term Goals: To be achieved in: 5 weeks  1. Disability index score of 25% or less for the LEFS to assist with reaching prior level of function. 2. Patient will demonstrate an increase in Strength to 5/5 knee ext/hip abd to allow for proper functional mobility as indicated by patients Functional Deficits. 3. Patient will return to walking up/down one flight of stairs without increased symptoms or restriction.          Electronically signed by:  Richy Faulkner, [id number] [facility]

## 2019-07-10 ENCOUNTER — HOSPITAL ENCOUNTER (OUTPATIENT)
Dept: PHYSICAL THERAPY | Age: 79
Setting detail: THERAPIES SERIES
Discharge: HOME | End: 2019-07-10
Payer: MEDICARE

## 2019-07-10 PROCEDURE — 97110 THERAPEUTIC EXERCISES: CPT | Performed by: SPECIALIST/TECHNOLOGIST

## 2019-07-10 NOTE — FLOWSHEET NOTE
and NMR EXR  [x] (83544) Provided verbal/tactile cueing for activities related to strengthening, flexibility, endurance, ROM for improvements in LE, proximal hip, and core control with self care, mobility, lifting, ambulation.  [] (59535) Provided verbal/tactile cueing for activities related to improving balance, coordination, kinesthetic sense, posture, motor skill, proprioception  to assist with LE, proximal hip, and core control in self care, mobility, lifting, ambulation and eccentric single leg control.      NMR and Therapeutic Activities:    [] (16421 or 38492) Provided verbal/tactile cueing for activities related to improving balance, coordination, kinesthetic sense, posture, motor skill, proprioception and motor activation to allow for proper function of core, proximal hip and LE with self care and ADLs  [] (93948) Gait Re-education- Provided training and instruction to the patient for proper LE, core and proximal hip recruitment and positioning and eccentric body weight control with ambulation re-education including up and down stairs     Home Exercise Program:    [x] (80751) Reviewed/Progressed HEP activities related to strengthening, flexibility, endurance, ROM of core, proximal hip and LE for functional self-care, mobility, lifting and ambulation/stair navigation   [] (21913)Reviewed/Progressed HEP activities related to improving balance, coordination, kinesthetic sense, posture, motor skill, proprioception of core, proximal hip and LE for self care, mobility, lifting, and ambulation/stair navigation      Manual Treatments:  PROM / Scar Mobs / STM/Rollerstick / Knee (Flex./Ext.)  Stretch: H.S. / ITB / Piriformis / Matthew Bent / Groin / Hip Flexor   [] (89853) Provided manual therapy to mobilize LE, proximal hip and/or LS spine soft tissue/joints for the purpose of modulating pain, promoting relaxation,  increasing ROM, reducing/eliminating soft tissue swelling/inflammation/restriction, improving soft tissue extensibility and allowing for proper ROM for normal function with self care, mobility, lifting and ambulation. Modalities:    Hx of cancer, CP 15'    Charges:  Timed Code Treatment Minutes: 30   Total Treatment Minutes: 45     [] EVAL (LOW) 22700 (typically 20 minutes face-to-face)  [] EVAL (MOD) 34260 (typically 30 minutes face-to-face)  [] EVAL (HIGH) 67594 (typically 45 minutes face-to-face)  [] RE-EVAL     [x] RF(47528) x 2    [] IONTO  [] NMR (38817) x     [] VASO  [] Manual (51250) x      [] Other:  [] TA x      [] Mech Traction (67741)  [] ES(attended) (24478)      [] ES (un) (70959):     GOALS:   Short Term Goals: To be achieved in: 2 weeks  1. Independent in HEP and progression per patient tolerance, in order to prevent re-injury. 2. Patient will have a decrease in pain to facilitate improvement in movement, function, and ADLs as indicated by Functional Deficits. Long Term Goals: To be achieved in: 5 weeks  1. Disability index score of 25% or less for the LEFS to assist with reaching prior level of function. 2. Patient will demonstrate an increase in Strength to 5/5 knee ext/hip abd to allow for proper functional mobility as indicated by patients Functional Deficits. 3. Patient will return to walking up/down one flight of stairs without increased symptoms or restriction. Progression Towards Functional goals:  [] Patient is progressing as expected towards functional goals listed. [] Progression is slowed due to complexities listed. [] Progression has been slowed due to co-morbidities. [x] Plan just implemented, too soon to assess goals progression  [] Other:     ASSESSMENT:  Pt. Was fatigued at the end of tx.        Treatment/Activity Tolerance:  [x] Patient tolerated treatment well [] Patient limited by fatique  [] Patient limited by pain  [] Patient limited by other medical complications  [] Other:     Prognosis: [x] Good [] Fair  [] Poor    Patient Requires Follow-up: [x] Yes  []

## 2019-07-12 ENCOUNTER — APPOINTMENT (OUTPATIENT)
Dept: PHYSICAL THERAPY | Age: 79
End: 2019-07-12
Payer: MEDICARE

## 2019-07-12 ENCOUNTER — HOSPITAL ENCOUNTER (OUTPATIENT)
Dept: PHYSICAL THERAPY | Age: 79
Setting detail: THERAPIES SERIES
Discharge: HOME | End: 2019-07-12
Payer: MEDICARE

## 2019-07-12 PROCEDURE — 97110 THERAPEUTIC EXERCISES: CPT | Performed by: SPECIALIST/TECHNOLOGIST

## 2019-07-12 NOTE — FLOWSHEET NOTE
improving soft tissue extensibility and allowing for proper ROM for normal function with self care, mobility, lifting and ambulation. Modalities:    Hx of cancer, CP 15'    Charges:  Timed Code Treatment Minutes: 30   Total Treatment Minutes: 45     [] EVAL (LOW) 95998 (typically 20 minutes face-to-face)  [] EVAL (MOD) 21158 (typically 30 minutes face-to-face)  [] EVAL (HIGH) 71219 (typically 45 minutes face-to-face)  [] RE-EVAL     [x] GQ(35402) x 2    [] IONTO  [] NMR (82793) x     [] VASO  [] Manual (63738) x      [] Other:  [] TA x      [] Mech Traction (27882)  [] ES(attended) (05970)      [] ES (un) (69493):     GOALS:   Short Term Goals: To be achieved in: 2 weeks  1. Independent in HEP and progression per patient tolerance, in order to prevent re-injury. 2. Patient will have a decrease in pain to facilitate improvement in movement, function, and ADLs as indicated by Functional Deficits. Long Term Goals: To be achieved in: 5 weeks  1. Disability index score of 25% or less for the LEFS to assist with reaching prior level of function. 2. Patient will demonstrate an increase in Strength to 5/5 knee ext/hip abd to allow for proper functional mobility as indicated by patients Functional Deficits. 3. Patient will return to walking up/down one flight of stairs without increased symptoms or restriction. Progression Towards Functional goals:  [] Patient is progressing as expected towards functional goals listed. [] Progression is slowed due to complexities listed. [] Progression has been slowed due to co-morbidities. [x] Plan just implemented, too soon to assess goals progression  [] Other:     ASSESSMENT:  Pt. Was fatigued at the end of tx.        Treatment/Activity Tolerance:  [x] Patient tolerated treatment well [] Patient limited by fatique  [] Patient limited by pain  [] Patient limited by other medical complications  [] Other:     Prognosis: [x] Good [] Fair  [] Poor    Patient Requires

## 2019-07-16 ENCOUNTER — HOSPITAL ENCOUNTER (OUTPATIENT)
Dept: PHYSICAL THERAPY | Age: 79
Setting detail: THERAPIES SERIES
Discharge: HOME | End: 2019-07-16
Payer: MEDICARE

## 2019-07-16 PROCEDURE — 97110 THERAPEUTIC EXERCISES: CPT | Performed by: PHYSICAL THERAPIST

## 2019-07-16 PROCEDURE — 97112 NEUROMUSCULAR REEDUCATION: CPT | Performed by: PHYSICAL THERAPIST

## 2019-07-16 NOTE — FLOWSHEET NOTE
[facility] [address] [place] [address] [facility]    [facility] Therapy Daily Treatment Note  Date:  2019    Patient Name:  Lola Brand    :  1940  MRN: [id number]  Restrictions/Precautions:    Medical/Treatment Diagnosis Information:  · Diagnosis: Right knee OA  M17.11  · Treatment Diagnosis: PT treatment diagnosis:  right knee pain, stiffness, weakness  Insurance/Certification information:  PT Insurance Information: Medicare  Physician Information:  Referring Practitioner: Dr Sincere Lala of care signed (Y/N):     Date of Patient follow up with Physician:     G-Code (if applicable):      Date G-Code Applied: The patient demonstrates at least 40% but less than 60% impairment, limitation or restriction in:   - walking and moving around (mobility)     Progress Note: [x]  Yes  []  No  Next due by: Visit #10       Latex Allergy:  [x]NO      []YES  Preferred Language for Healthcare:   [x]English       []other:    Visit # Insurance Allowable   4 Medicare     Auth Required   []  Yes    [] No    Visits Approved  Date Ranged-       Pain level:  3/10     SUBJECTIVE:   Knee feels like it is progressing but slowly. OBJECTIVE:    Observation:    Test measurements:      RESTRICTIONS/PRECAUTIONS: Hx of cancer, OA, Osteoporosis, HTN    Exercises/Interventions:  Rx 19    Exercise Sets/Reps Notes Last Progression   Gastroc Stretch 3 x 30\"  - slant    Heelslides       LLLD Wallslide      HS Stretch:  Tableside / 90-90 3 x 30\"  manual     SAQ 2# 2x15     LAQ      SLR Flex 2x15     SLR Abd 2x15      SLR Ext      SLR Add.       Clamshells - SL 3x10 B [place] loop    Bridges 2x15     HR/TR      Ankle Theraband      BAPS      Bike 5'     Elliptical      Standing Stretch:  (insert muscles)      Weight Shifting      Lateral Walk      Squats      Single Leg Stance Balance      Tandem balance 1' ea     [facility] TKE  abd 60# 30 x 3\"  45# 20 x B     FSU 4' x 15               Therapeutic Exercise extensibility and allowing for proper ROM for normal function with self care, mobility, lifting and ambulation. Modalities:    Hx of cancer, CP 15'    Charges:  Timed Code Treatment Minutes: 40   Total Treatment Minutes: 55     [] EVAL (LOW) 61388 (typically 20 minutes face-to-face)  [] EVAL (MOD) 60488 (typically 30 minutes face-to-face)  [] EVAL (HIGH) 73486 (typically 45 minutes face-to-face)  [] RE-EVAL     [x] KT(37974) x 2    [] IONTO  [x] NMR (80079) x 1     [] VASO  [] Manual (42767) x      [] Other:  [] TA x      [] Mech Traction (70951)  [] ES(attended) (46137)      [] ES (un) (52580):     GOALS:   Short Term Goals: To be achieved in: 2 weeks  1. Independent in HEP and progression per patient tolerance, in order to prevent re-injury. 2. Patient will have a decrease in pain to facilitate improvement in movement, function, and ADLs as indicated by Functional Deficits. Long Term Goals: To be achieved in: 5 weeks  1. Disability index score of 25% or less for the LEFS to assist with reaching prior level of function. 2. Patient will demonstrate an increase in Strength to 5/5 knee ext/hip abd to allow for proper functional mobility as indicated by patients Functional Deficits. 3. Patient will return to walking up/down one flight of stairs without increased symptoms or restriction. Progression Towards Functional goals:  [] Patient is progressing as expected towards functional goals listed. [] Progression is slowed due to complexities listed. [] Progression has been slowed due to co-morbidities. [x] Plan just implemented, too soon to assess goals progression  [] Other:     ASSESSMENT:  Tolerated increase in repetitions and progression of exercises without increase in pain. Decreased quad control with step up/down exercise.        Treatment/Activity Tolerance:  [x] Patient tolerated treatment well [] Patient limited by fatique  [] Patient limited by pain  [] Patient limited by other medical complications  [] Other:     Prognosis: [x] Good [] Fair  [] Poor    Patient Requires Follow-up: [x] Yes  [] No    PLAN FOR NEXT SESSION:     PLAN: See eval  [x] Continue per plan of care [] Alter current plan (see comments)  [] Plan of care initiated [] Hold pending MD visit [] Discharge    *If patient does not return for further follow ups after this date. Please consider this as the patients discharge from physical therapy.        Electronically signed by: Marizol Person PT, KENAN-ELENI

## 2019-07-18 ENCOUNTER — HOSPITAL ENCOUNTER (OUTPATIENT)
Dept: PHYSICAL THERAPY | Age: 79
Setting detail: THERAPIES SERIES
Discharge: HOME | End: 2019-07-18
Payer: MEDICARE

## 2019-07-18 PROCEDURE — 97110 THERAPEUTIC EXERCISES: CPT | Performed by: SPECIALIST/TECHNOLOGIST

## 2019-07-18 NOTE — FLOWSHEET NOTE
The [address] [place] [address] [facility]    Physical Therapy Daily Treatment Note  Date:  2019    Patient Name:  Richard Falcon    :  1940  MRN: [id number]  Restrictions/Precautions:    Medical/Treatment Diagnosis Information:  · Diagnosis: Right knee OA  M17.11  · Treatment Diagnosis: PT treatment diagnosis:  right knee pain, stiffness, weakness  Insurance/Certification information:  PT Insurance Information: Medicare  Physician Information:  Referring Practitioner: Dr Michelle Toney of care signed (Y/N):     Date of Patient follow up with Physician:     G-Code (if applicable):      Date G-Code Applied: The patient demonstrates at least 40% but less than 60% impairment, limitation or restriction in:   - walking and moving around (mobility)     Progress Note: [x]  Yes  []  No  Next due by: Visit #10       Latex Allergy:  [x]NO      []YES  Preferred Language for Healthcare:   [x]English       []other:    Visit # Insurance Allowable   5 Medicare     Auth Required   []  Yes    [] No    Visits Approved  Date Ranged-       Pain level:  2/10     SUBJECTIVE:   Pt. Reports her knee has made some improves with PT but the knee still has discomfort with wt. Bearing activities. OBJECTIVE:    Observation:    Test measurements:      RESTRICTIONS/PRECAUTIONS: Hx of cancer, OA, Osteoporosis, HTN    Exercises/Interventions:  Rx 19    Exercise Sets/Reps Notes Last Progression   Gastroc Stretch 3 x 30\"  - slant    Heelslides       LLLD Wallslide      HS Stretch:  Tableside / 90-90 3 x 30\"  manual     SAQ 2# 2x15     LAQ      SLR Flex 2x15     SLR Abd 2x15      SLR Ext      SLR Add.       Clamshells - SL 3x10 B [place] loop    Bridges 2x15     HR/TR      Ankle Theraband      BAPS      Bike 5'     Elliptical      Standing Stretch:  (insert muscles)      Weight Shifting      Lateral Walk      Squats      Single Leg Stance Balance      Tandem balance 1' [facility] TKE  abd 60#

## 2019-07-18 NOTE — PROGRESS NOTES
I have reviewed and agree to the content of the note created by the Physical Therapist Assistant.     Electronically signed by Kiley So PT

## 2019-07-19 ENCOUNTER — OFFICE VISIT (OUTPATIENT)
Dept: ORTHOPEDIC SURGERY | Age: 79
End: 2019-07-19
Payer: MEDICARE

## 2019-07-19 VITALS — BODY MASS INDEX: 28.36 KG/M2 | HEIGHT: 63 IN | RESPIRATION RATE: 17 BRPM | WEIGHT: 160.05 LBS

## 2019-07-19 DIAGNOSIS — M17.11 PRIMARY OSTEOARTHRITIS OF RIGHT KNEE: Primary | ICD-10-CM

## 2019-07-19 PROCEDURE — 99213 OFFICE O/P EST LOW 20 MIN: CPT | Performed by: ORTHOPAEDIC SURGERY

## 2019-07-19 PROCEDURE — G8427 DOCREV CUR MEDS BY ELIG CLIN: HCPCS | Performed by: ORTHOPAEDIC SURGERY

## 2019-07-19 PROCEDURE — 1123F ACP DISCUSS/DSCN MKR DOCD: CPT | Performed by: ORTHOPAEDIC SURGERY

## 2019-07-19 PROCEDURE — G8399 PT W/DXA RESULTS DOCUMENT: HCPCS | Performed by: ORTHOPAEDIC SURGERY

## 2019-07-19 PROCEDURE — 4040F PNEUMOC VAC/ADMIN/RCVD: CPT | Performed by: ORTHOPAEDIC SURGERY

## 2019-07-19 PROCEDURE — G8419 CALC BMI OUT NRM PARAM NOF/U: HCPCS | Performed by: ORTHOPAEDIC SURGERY

## 2019-07-19 PROCEDURE — 1036F TOBACCO NON-USER: CPT | Performed by: ORTHOPAEDIC SURGERY

## 2019-07-19 PROCEDURE — 1090F PRES/ABSN URINE INCON ASSESS: CPT | Performed by: ORTHOPAEDIC SURGERY

## 2019-07-23 ENCOUNTER — HOSPITAL ENCOUNTER (OUTPATIENT)
Dept: PHYSICAL THERAPY | Age: 79
Setting detail: THERAPIES SERIES
Discharge: HOME | End: 2019-07-23
Payer: MEDICARE

## 2019-07-23 PROCEDURE — 97110 THERAPEUTIC EXERCISES: CPT | Performed by: PHYSICAL THERAPIST

## 2019-07-25 ENCOUNTER — HOSPITAL ENCOUNTER (OUTPATIENT)
Dept: PHYSICAL THERAPY | Age: 79
Setting detail: THERAPIES SERIES
Discharge: HOME | End: 2019-07-25
Payer: MEDICARE

## 2019-07-25 PROCEDURE — 97110 THERAPEUTIC EXERCISES: CPT | Performed by: SPECIALIST/TECHNOLOGIST

## 2019-07-25 NOTE — FLOWSHEET NOTE
reducing/eliminating soft tissue swelling/inflammation/restriction, improving soft tissue extensibility and allowing for proper ROM for normal function with self care, mobility, lifting and ambulation. Modalities:    Hx of cancer, CP 15'    Charges:  Timed Code Treatment Minutes: 30   Total Treatment Minutes: 45     [] EVAL (LOW) 74504 (typically 20 minutes face-to-face)  [] EVAL (MOD) 82292 (typically 30 minutes face-to-face)  [] EVAL (HIGH) 66085 (typically 45 minutes face-to-face)  [] RE-EVAL     [x] TQ(82392) x 2    [] IONTO  [] NMR (86583) x      [] VASO  [] Manual (90668) x      [] Other:  [] TA x      [] Mech Traction (47370)  [] ES(attended) (29939)      [] ES (un) (71466):     GOALS:   Short Term Goals: To be achieved in: 2 weeks  1. Independent in HEP and progression per patient tolerance, in order to prevent re-injury. 2. Patient will have a decrease in pain to facilitate improvement in movement, function, and ADLs as indicated by Functional Deficits. Long Term Goals: To be achieved in: 5 weeks  1. Disability index score of 25% or less for the LEFS to assist with reaching prior level of function. 2. Patient will demonstrate an increase in Strength to 5/5 knee ext/hip abd to allow for proper functional mobility as indicated by patients Functional Deficits. 3. Patient will return to walking up/down one flight of stairs without increased symptoms or restriction. Progression Towards Functional goals:  [x] Patient is progressing as expected towards functional goals listed. [] Progression is slowed due to complexities listed. [] Progression has been slowed due to co-morbidities. [] Plan just implemented, too soon to assess goals progression  [] Other:     ASSESSMENT:  Tolerated increase in repetitions and progression of exercises without increase in pain. Decreased quad control with step up/down exercise.        Treatment/Activity Tolerance:  [x] Patient tolerated treatment well [] Patient limited by fatique  [] Patient limited by pain  [] Patient limited by other medical complications  [] Other:     Prognosis: [x] Good [] Fair  [] Poor    Patient Requires Follow-up: [x] Yes  [] No    PLAN FOR NEXT SESSION:     PLAN: D/C in 2 more visits, on 8/6/19  [x] Continue per plan of care [] Alter current plan (see comments)  [] Plan of care initiated [] Hold pending MD visit [] Discharge    *If patient does not return for further follow ups after this date. Please consider this as the patients discharge from physical therapy.        Electronically signed by: Maribel Prieto, [address], [address]onda [address]

## 2019-07-30 ENCOUNTER — APPOINTMENT (OUTPATIENT)
Dept: PHYSICAL THERAPY | Age: 79
End: 2019-07-30
Payer: MEDICARE

## 2019-08-01 ENCOUNTER — APPOINTMENT (OUTPATIENT)
Dept: PHYSICAL THERAPY | Age: 79
End: 2019-08-01
Payer: MEDICARE

## 2019-08-05 ENCOUNTER — HOSPITAL ENCOUNTER (OUTPATIENT)
Dept: PHYSICAL THERAPY | Age: 79
Setting detail: THERAPIES SERIES
Discharge: HOME | End: 2019-08-05
Payer: MEDICARE

## 2019-08-05 PROCEDURE — 97110 THERAPEUTIC EXERCISES: CPT | Performed by: SPECIALIST/TECHNOLOGIST

## 2019-08-05 NOTE — FLOWSHEET NOTE
The [address] [place] [address] [facility]    Physical Therapy Daily Treatment Note  Date:  2019    Patient Name:  Zen Guy    :  1940  MRN: [id number]  Restrictions/Precautions:    Medical/Treatment Diagnosis Information:  · Diagnosis: Right knee OA  M17.11  · Treatment Diagnosis: PT treatment diagnosis:  right knee pain, stiffness, weakness  Insurance/Certification information:  PT Insurance Information: Medicare  Physician Information:  Referring Practitioner: Dr Jonas Ortiz of care signed (Y/N):     Date of Patient follow up with Physician:     G-Code (if applicable):      Date G-Code Applied: The patient demonstrates at least 40% but less than 60% impairment, limitation or restriction in:   - walking and moving around (mobility)     Progress Note: [x]  Yes  []  No  Next due by: Visit #10       Latex Allergy:  [x]NO      []YES  Preferred Language for Healthcare:   [x]English       []other:    Visit # Insurance Allowable   8  Medicare     Auth Required   []  Yes    [] No    Visits Approved  Date Ranged-       Pain level:  1/10     SUBJECTIVE:   States her knee was a little sore from doing a lot of bike riding on vacation last week. OBJECTIVE:    Observation:    Test measurements:      RESTRICTIONS/PRECAUTIONS: Hx of cancer, OA, Osteoporosis, HTN    Exercises/Interventions:  Rx 19    Exercise Sets/Reps Notes Last Progression   Gastroc Stretch 3 x 30\"  - slant    Heelslides       LLLD Wallslide      HS Stretch:  Tableside / 90-90 3 x 30\"  manual     SAQ 2# 2x15 NV    LAQ      SLR Flex 2x15     SLR Abd 2x15 NV     SLR Ext      SLR Add.       Clamshells - SL 3x10 B Dodge loop    Bridges 2x15     HR/TR      Ankle Theraband      BAPS      Bike 5'     Elliptical      Standing Stretch:  (insert muscles)      Weight Shifting      Lateral Walk      PEP 2' New     Single Leg Stance Balance      Tandem balance 1' ea     GER TKE  abd 60# 30 x 3\"  45# 30 x B limited by fatique  [] Patient limited by pain  [] Patient limited by other medical complications  [] Other:     Prognosis: [x] Good [] Fair  [] Poor    Patient Requires Follow-up: [x] Yes  [] No    PLAN FOR NEXT SESSION:     PLAN: D/C NV  [x] Continue per plan of care [] Alter current plan (see comments)  [] Plan of care initiated [] Hold pending MD visit [] Discharge    *If patient does not return for further follow ups after this date. Please consider this as the patients discharge from physical therapy.        Electronically signed by: Nati Hermosillo 85, Rhonda Zaragoza 1

## 2019-08-06 ENCOUNTER — HOSPITAL ENCOUNTER (OUTPATIENT)
Dept: PHYSICAL THERAPY | Age: 79
Setting detail: THERAPIES SERIES
Discharge: HOME | End: 2019-08-06
Payer: MEDICARE

## 2019-08-06 PROCEDURE — 97110 THERAPEUTIC EXERCISES: CPT | Performed by: SPECIALIST/TECHNOLOGIST

## 2019-08-06 PROCEDURE — 97112 NEUROMUSCULAR REEDUCATION: CPT | Performed by: SPECIALIST/TECHNOLOGIST

## 2019-08-06 NOTE — FLOWSHEET NOTE
complications  [] Other:     Prognosis: [x] Good [] Fair  [] Poor    Patient Requires Follow-up: [] Yes  [x] No    PLAN FOR NEXT SESSION:     PLAN:   [] Continue per plan of care [] Alter current plan (see comments)  [] Plan of care initiated [] Hold pending MD visit [x] Discharge    *If patient does not return for further follow ups after this date. Please consider this as the patients discharge from physical therapy.        Electronically signed by: Jamee Goel, [address], Rhonda Roberts 1

## 2019-08-08 ENCOUNTER — OFFICE VISIT (OUTPATIENT)
Dept: ENDOCRINOLOGY | Age: 79
End: 2019-08-08
Payer: MEDICARE

## 2019-08-08 VITALS
OXYGEN SATURATION: 96 % | HEART RATE: 112 BPM | HEIGHT: 63 IN | WEIGHT: 167 LBS | SYSTOLIC BLOOD PRESSURE: 146 MMHG | BODY MASS INDEX: 29.59 KG/M2 | DIASTOLIC BLOOD PRESSURE: 90 MMHG

## 2019-08-08 DIAGNOSIS — E83.52 HYPERCALCEMIA: ICD-10-CM

## 2019-08-08 DIAGNOSIS — E55.9 VITAMIN D DEFICIENCY: ICD-10-CM

## 2019-08-08 DIAGNOSIS — M85.852 OSTEOPENIA OF BOTH THIGHS: ICD-10-CM

## 2019-08-08 DIAGNOSIS — M85.851 OSTEOPENIA OF BOTH THIGHS: ICD-10-CM

## 2019-08-08 DIAGNOSIS — E21.3 HYPERPARATHYROIDISM (HCC): Primary | ICD-10-CM

## 2019-08-08 PROCEDURE — G8399 PT W/DXA RESULTS DOCUMENT: HCPCS | Performed by: INTERNAL MEDICINE

## 2019-08-08 PROCEDURE — 1036F TOBACCO NON-USER: CPT | Performed by: INTERNAL MEDICINE

## 2019-08-08 PROCEDURE — 1123F ACP DISCUSS/DSCN MKR DOCD: CPT | Performed by: INTERNAL MEDICINE

## 2019-08-08 PROCEDURE — 1090F PRES/ABSN URINE INCON ASSESS: CPT | Performed by: INTERNAL MEDICINE

## 2019-08-08 PROCEDURE — G8427 DOCREV CUR MEDS BY ELIG CLIN: HCPCS | Performed by: INTERNAL MEDICINE

## 2019-08-08 PROCEDURE — 4040F PNEUMOC VAC/ADMIN/RCVD: CPT | Performed by: INTERNAL MEDICINE

## 2019-08-08 PROCEDURE — 99204 OFFICE O/P NEW MOD 45 MIN: CPT | Performed by: INTERNAL MEDICINE

## 2019-08-08 PROCEDURE — G8419 CALC BMI OUT NRM PARAM NOF/U: HCPCS | Performed by: INTERNAL MEDICINE

## 2019-08-08 NOTE — PROGRESS NOTES
"Subjective:       Patient ID: Carolyn Mina is a 37 y.o. female.    Chief Complaint: Wrist Pain (10 months)    HPI    Review of Systems      Objective:      Physical Exam    /64 (BP Location: Left arm, Patient Position: Sitting, BP Method: Large (Manual))   Pulse 72   Temp 97.8 °F (36.6 °C)   Ht 5' 6" (1.676 m)   Wt 122.5 kg (270 lb 1 oz)   LMP 07/19/2017 (Exact Date)   BMI 43.59 kg/m²         Assessment/Plan     There are no diagnoses linked to this encounter.    No Follow-up on file.      Irma Metz MD  Department of Internal Medicine - [facility]  12:03 PM  " Endocrinology       New Patient Consultation  Basim Barfield M.D. Phone: [phone]   FAX: [phone]       Paloma Naylor   YOB: 1940    Date of Visit:  8/8/2019    Allergies   Allergen Reactions    Amoxicillin-Pot Clavulanate Diarrhea     Outpatient Medications Marked as Taking for the 8/8/19 encounter (Office Visit) with Vipin Judge MD   Medication Sig Dispense Refill    alendronate (FOSAMAX) 70 MG tablet TAKE 1 TABLET EVERY 7 DAYS WITH WATER, 30 MINUTES BEFORE FIRST MEAL, REMAIN UPRIGHT FOR 30 MINUTES 4 tablet 5    metoprolol succinate (TOPROL XL) 100 MG extended release tablet TAKE 1 TABLET BY MOUTH EVERY DAY 90 tablet 1    losartan-hydrochlorothiazide (HYZAAR) 100-12.5 MG per tablet TAKE 1 TABLET BY MOUTH EVERY DAY 90 tablet 1    cetirizine (ZYRTEC ALLERGY) 10 MG tablet Take 1 tablet by mouth daily      triamcinolone (NASACORT AQ) 55 MCG/ACT nasal inhaler 2 sprays by Nasal route daily      Cholecalciferol (VITAMIN D) 1000 UNIT TABS Take 1 tablet by mouth daily. Vitals:    08/08/19 1506 08/08/19 1509   BP: (!) 144/79 (!) 146/90   Site: Left Upper Arm Right Upper Arm   Position: Sitting Sitting   Cuff Size: Medium Adult Medium Adult   Pulse: 112    SpO2: 96%    Weight: 167 lb (75.8 kg)    Height: 5' 3\" (1.6 m)      Body mass index is 29.58 kg/m².      Wt Readings from Last 3 Encounters:   08/08/19 167 lb (75.8 kg)   07/19/19 160 lb 0.9 oz (72.6 kg)   06/21/19 160 lb (72.6 kg)     BP Readings from Last 3 Encounters:   08/08/19 (!) 146/90   06/13/19 138/76   12/20/18 128/76        Past Medical History:   Diagnosis Date    Allergic rhinitis 8/25/2016    Hypertension     Low back pain     Medial meniscus tear     Left knee    Meningioma ([facility] [address]     T11-12    Osteopenia      Past Surgical History:   Procedure Laterality Date    DILATION AND CURETTAGE OF UTERUS  1985    HYSTERECTOMY, VAGINAL  1985    LIPOMA RESECTION  1991    Thigh, buttock    SHOULDER normal. Thought content normal.       Lab Results   Component Value Date    CALCIUM 10.8 (H) 06/13/2019    CALCIUM 10.4 12/20/2018    CALCIUM 10.7 (H) 06/28/2018    CALCIUM 10.2 10/03/2017    CALCIUM 9.9 03/30/2017    CALCIUM 10.0 09/29/2016    CALCIUM 9.9 07/12/2016    CALCIUM 9.8 01/14/2016    CALCIUM 9.9 07/02/2015    CALCIUM 9.6 11/13/2014     Orders Only on 06/13/2019   Component Date Value Ref Range Status    Vitamin B-12 06/13/2019 675  211 - 911 pg/mL Final    Sodium 06/13/2019 142  136 - 145 mmol/L Final    Potassium 06/13/2019 4.9  3.5 - 5.1 mmol/L Final    Chloride 06/13/2019 103  99 - 110 mmol/L Final    CO2 06/13/2019 30  21 - 32 mmol/L Final    Anion Gap 06/13/2019 9  3 - 16 Final    Glucose, Fasting 06/13/2019 92  70 - 99 mg/dL Final    BUN 06/13/2019 21* 7 - 20 mg/dL Final    CREATININE 06/13/2019 0.6  0.6 - 1.2 mg/dL Final    GFR Non- 06/13/2019 >60  >60 Final    Comment: >60 mL/min/1.73m2 EGFR, calc. for ages 25 and older using the  MDRD formula (not corrected for weight), is valid for stable  renal function.  GFR  06/13/2019 >60  >60 Final    Comment: Chronic Kidney Disease: less than 60 ml/min/1.73 sq.m. Kidney Failure: less than 15 ml/min/1.73 sq.m. Results valid for patients 18 years and older.  Calcium 06/13/2019 10.8* 8.3 - 10.6 mg/dL Final    Total Protein 06/13/2019 6.8  6.4 - 8.2 g/dL Final    Alb 06/13/2019 4.4  3.4 - 5.0 g/dL Final    Albumin/Globulin Ratio 06/13/2019 1.8  1.1 - 2.2 Final    Total Bilirubin 06/13/2019 0.6  0.0 - 1.0 mg/dL Final    Alkaline Phosphatase 06/13/2019 76  40 - 129 U/L Final    ALT 06/13/2019 22  10 - 40 U/L Final    AST 06/13/2019 15  15 - 37 U/L Final    Globulin 06/13/2019 2.4  g/dL Final    PTH 06/13/2019 119.2* 14.0 - 72.0 pg/mL Final         Assessment/Plan      1.  Hypercalcemia     Fely cMqueen is a 70 y.o. female was found to have a high calcium  .2  Most likely diagnosis is

## 2019-08-16 DIAGNOSIS — I10 ESSENTIAL HYPERTENSION: ICD-10-CM

## 2019-08-16 RX ORDER — LOSARTAN POTASSIUM AND HYDROCHLOROTHIAZIDE 12.5; 1 MG/1; MG/1
TABLET ORAL
Qty: 90 TABLET | Refills: 1 | Status: SHIPPED | OUTPATIENT
Start: 2019-08-16 | End: 2019-12-12 | Stop reason: SDUPTHER

## 2019-08-16 RX ORDER — METOPROLOL SUCCINATE 100 MG/1
TABLET, EXTENDED RELEASE ORAL
Qty: 90 TABLET | Refills: 1 | Status: SHIPPED | OUTPATIENT
Start: 2019-08-16 | End: 2020-02-14 | Stop reason: SDUPTHER

## 2019-10-08 ENCOUNTER — OFFICE VISIT (OUTPATIENT)
Dept: INTERNAL MEDICINE CLINIC | Age: 79
End: 2019-10-08
Payer: MEDICARE

## 2019-10-08 VITALS
DIASTOLIC BLOOD PRESSURE: 80 MMHG | BODY MASS INDEX: 29.05 KG/M2 | TEMPERATURE: 97.5 F | WEIGHT: 164 LBS | SYSTOLIC BLOOD PRESSURE: 134 MMHG

## 2019-10-08 DIAGNOSIS — R22.2 MASS ON BACK: ICD-10-CM

## 2019-10-08 PROCEDURE — 1090F PRES/ABSN URINE INCON ASSESS: CPT | Performed by: INTERNAL MEDICINE

## 2019-10-08 PROCEDURE — 99213 OFFICE O/P EST LOW 20 MIN: CPT | Performed by: INTERNAL MEDICINE

## 2019-10-08 PROCEDURE — G8419 CALC BMI OUT NRM PARAM NOF/U: HCPCS | Performed by: INTERNAL MEDICINE

## 2019-10-08 PROCEDURE — G8427 DOCREV CUR MEDS BY ELIG CLIN: HCPCS | Performed by: INTERNAL MEDICINE

## 2019-10-08 PROCEDURE — 4040F PNEUMOC VAC/ADMIN/RCVD: CPT | Performed by: INTERNAL MEDICINE

## 2019-10-08 PROCEDURE — 1123F ACP DISCUSS/DSCN MKR DOCD: CPT | Performed by: INTERNAL MEDICINE

## 2019-10-08 PROCEDURE — G8484 FLU IMMUNIZE NO ADMIN: HCPCS | Performed by: INTERNAL MEDICINE

## 2019-10-08 PROCEDURE — G8399 PT W/DXA RESULTS DOCUMENT: HCPCS | Performed by: INTERNAL MEDICINE

## 2019-10-08 PROCEDURE — 1036F TOBACCO NON-USER: CPT | Performed by: INTERNAL MEDICINE

## 2019-10-22 ENCOUNTER — OFFICE VISIT (OUTPATIENT)
Dept: SURGERY | Age: 79
End: 2019-10-22
Payer: MEDICARE

## 2019-10-22 VITALS
BODY MASS INDEX: 28.34 KG/M2 | SYSTOLIC BLOOD PRESSURE: 135 MMHG | DIASTOLIC BLOOD PRESSURE: 60 MMHG | WEIGHT: 166 LBS | HEIGHT: 64 IN

## 2019-10-22 DIAGNOSIS — R22.2 MASS ON BACK: Primary | ICD-10-CM

## 2019-10-22 DIAGNOSIS — D32.1 BENIGN TUMOR OF SPINAL MENINGIOMA (HCC): ICD-10-CM

## 2019-10-22 PROCEDURE — G8427 DOCREV CUR MEDS BY ELIG CLIN: HCPCS | Performed by: SURGERY

## 2019-10-22 PROCEDURE — G8484 FLU IMMUNIZE NO ADMIN: HCPCS | Performed by: SURGERY

## 2019-10-22 PROCEDURE — 1090F PRES/ABSN URINE INCON ASSESS: CPT | Performed by: SURGERY

## 2019-10-22 PROCEDURE — 99202 OFFICE O/P NEW SF 15 MIN: CPT | Performed by: SURGERY

## 2019-10-22 PROCEDURE — G8417 CALC BMI ABV UP PARAM F/U: HCPCS | Performed by: SURGERY

## 2019-10-22 ASSESSMENT — ENCOUNTER SYMPTOMS
SINUS PRESSURE: 1
ALLERGIC/IMMUNOLOGIC NEGATIVE: 1
COUGH: 1
GASTROINTESTINAL NEGATIVE: 1
EYES NEGATIVE: 1

## 2019-10-24 ENCOUNTER — TELEPHONE (OUTPATIENT)
Dept: INTERNAL MEDICINE CLINIC | Age: 79
End: 2019-10-24

## 2019-10-24 DIAGNOSIS — R22.2 MASS ON BACK: Primary | ICD-10-CM

## 2019-10-31 ENCOUNTER — HOSPITAL ENCOUNTER (OUTPATIENT)
Dept: MRI IMAGING | Age: 79
Discharge: HOME | End: 2019-10-31
Payer: MEDICARE

## 2019-10-31 DIAGNOSIS — R22.2 MASS ON BACK: ICD-10-CM

## 2019-10-31 DIAGNOSIS — D32.1 BENIGN TUMOR OF SPINAL MENINGIOMA (HCC): ICD-10-CM

## 2019-10-31 DIAGNOSIS — D32.1 BENIGN NEOPLASM OF SPINAL MENINGES (HCC): ICD-10-CM

## 2019-10-31 PROCEDURE — 72157 MRI CHEST SPINE W/O & W/DYE: CPT

## 2019-10-31 PROCEDURE — A9579 GAD-BASE MR CONTRAST NOS,1ML: HCPCS | Performed by: SURGERY

## 2019-10-31 PROCEDURE — 6360000004 HC RX CONTRAST MEDICATION: Performed by: SURGERY

## 2019-10-31 RX ADMIN — GADOTERIDOL 14 ML: 279.3 INJECTION, SOLUTION INTRAVENOUS at 14:27

## 2019-11-14 ENCOUNTER — TELEPHONE (OUTPATIENT)
Dept: SURGERY | Age: 79
End: 2019-11-14

## 2019-11-19 ENCOUNTER — TELEPHONE (OUTPATIENT)
Dept: SURGERY | Age: 79
End: 2019-11-19

## 2019-12-02 ENCOUNTER — TELEPHONE (OUTPATIENT)
Dept: ENDOCRINOLOGY | Age: 79
End: 2019-12-02

## 2019-12-02 DIAGNOSIS — E21.3 HYPERPARATHYROIDISM (HCC): Primary | ICD-10-CM

## 2019-12-05 ENCOUNTER — OFFICE VISIT (OUTPATIENT)
Dept: ENDOCRINOLOGY | Age: 79
End: 2019-12-05
Payer: MEDICARE

## 2019-12-05 VITALS
HEART RATE: 60 BPM | BODY MASS INDEX: 29.13 KG/M2 | OXYGEN SATURATION: 97 % | SYSTOLIC BLOOD PRESSURE: 141 MMHG | HEIGHT: 63 IN | DIASTOLIC BLOOD PRESSURE: 81 MMHG | WEIGHT: 164.4 LBS

## 2019-12-05 DIAGNOSIS — M85.851 OSTEOPENIA OF BOTH THIGHS: ICD-10-CM

## 2019-12-05 DIAGNOSIS — M85.852 OSTEOPENIA OF BOTH THIGHS: ICD-10-CM

## 2019-12-05 DIAGNOSIS — N95.9 POST MENOPAUSAL PROBLEMS: ICD-10-CM

## 2019-12-05 DIAGNOSIS — E83.52 HYPERCALCEMIA: ICD-10-CM

## 2019-12-05 DIAGNOSIS — E21.3 HYPERPARATHYROIDISM (HCC): Primary | ICD-10-CM

## 2019-12-05 PROCEDURE — G8399 PT W/DXA RESULTS DOCUMENT: HCPCS | Performed by: INTERNAL MEDICINE

## 2019-12-05 PROCEDURE — 1036F TOBACCO NON-USER: CPT | Performed by: INTERNAL MEDICINE

## 2019-12-05 PROCEDURE — 1090F PRES/ABSN URINE INCON ASSESS: CPT | Performed by: INTERNAL MEDICINE

## 2019-12-05 PROCEDURE — 4040F PNEUMOC VAC/ADMIN/RCVD: CPT | Performed by: INTERNAL MEDICINE

## 2019-12-05 PROCEDURE — 1123F ACP DISCUSS/DSCN MKR DOCD: CPT | Performed by: INTERNAL MEDICINE

## 2019-12-05 PROCEDURE — G8417 CALC BMI ABV UP PARAM F/U: HCPCS | Performed by: INTERNAL MEDICINE

## 2019-12-05 PROCEDURE — 99214 OFFICE O/P EST MOD 30 MIN: CPT | Performed by: INTERNAL MEDICINE

## 2019-12-05 PROCEDURE — G8427 DOCREV CUR MEDS BY ELIG CLIN: HCPCS | Performed by: INTERNAL MEDICINE

## 2019-12-05 PROCEDURE — G8484 FLU IMMUNIZE NO ADMIN: HCPCS | Performed by: INTERNAL MEDICINE

## 2019-12-05 ASSESSMENT — ENCOUNTER SYMPTOMS
BACK PAIN: 0
PHOTOPHOBIA: 0
COUGH: 0

## 2019-12-10 ENCOUNTER — HOSPITAL ENCOUNTER (OUTPATIENT)
Dept: GENERAL RADIOLOGY | Age: 79
Discharge: HOME | End: 2019-12-10
Payer: MEDICARE

## 2019-12-10 DIAGNOSIS — M85.852 OSTEOPENIA OF BOTH THIGHS: ICD-10-CM

## 2019-12-10 DIAGNOSIS — E21.3 HYPERPARATHYROIDISM (HCC): ICD-10-CM

## 2019-12-10 DIAGNOSIS — M85.851 OSTEOPENIA OF BOTH THIGHS: ICD-10-CM

## 2019-12-10 DIAGNOSIS — N95.9 POST MENOPAUSAL PROBLEMS: ICD-10-CM

## 2019-12-10 PROCEDURE — 77080 DXA BONE DENSITY AXIAL: CPT

## 2019-12-10 PROCEDURE — 77081 DXA BONE DENSITY APPENDICULR: CPT

## 2019-12-12 ENCOUNTER — OFFICE VISIT (OUTPATIENT)
Dept: INTERNAL MEDICINE CLINIC | Age: 79
End: 2019-12-12
Payer: MEDICARE

## 2019-12-12 VITALS
OXYGEN SATURATION: 97 % | SYSTOLIC BLOOD PRESSURE: 136 MMHG | HEART RATE: 65 BPM | BODY MASS INDEX: 28.87 KG/M2 | WEIGHT: 163 LBS | DIASTOLIC BLOOD PRESSURE: 78 MMHG

## 2019-12-12 DIAGNOSIS — R22.2 MASS ON BACK: ICD-10-CM

## 2019-12-12 DIAGNOSIS — M85.851 OSTEOPENIA OF BOTH THIGHS: ICD-10-CM

## 2019-12-12 DIAGNOSIS — M85.852 OSTEOPENIA OF BOTH THIGHS: ICD-10-CM

## 2019-12-12 DIAGNOSIS — I10 ESSENTIAL HYPERTENSION: Primary | ICD-10-CM

## 2019-12-12 DIAGNOSIS — E21.3 HYPERPARATHYROIDISM (HCC): ICD-10-CM

## 2019-12-12 PROCEDURE — 99214 OFFICE O/P EST MOD 30 MIN: CPT | Performed by: INTERNAL MEDICINE

## 2019-12-12 PROCEDURE — G8417 CALC BMI ABV UP PARAM F/U: HCPCS | Performed by: INTERNAL MEDICINE

## 2019-12-12 PROCEDURE — G8399 PT W/DXA RESULTS DOCUMENT: HCPCS | Performed by: INTERNAL MEDICINE

## 2019-12-12 PROCEDURE — 1090F PRES/ABSN URINE INCON ASSESS: CPT | Performed by: INTERNAL MEDICINE

## 2019-12-12 PROCEDURE — G8484 FLU IMMUNIZE NO ADMIN: HCPCS | Performed by: INTERNAL MEDICINE

## 2019-12-12 PROCEDURE — 1036F TOBACCO NON-USER: CPT | Performed by: INTERNAL MEDICINE

## 2019-12-12 PROCEDURE — G8427 DOCREV CUR MEDS BY ELIG CLIN: HCPCS | Performed by: INTERNAL MEDICINE

## 2019-12-12 PROCEDURE — 4040F PNEUMOC VAC/ADMIN/RCVD: CPT | Performed by: INTERNAL MEDICINE

## 2019-12-12 PROCEDURE — 1123F ACP DISCUSS/DSCN MKR DOCD: CPT | Performed by: INTERNAL MEDICINE

## 2019-12-12 RX ORDER — LOSARTAN POTASSIUM AND HYDROCHLOROTHIAZIDE 12.5; 1 MG/1; MG/1
TABLET ORAL
Qty: 90 TABLET | Refills: 1 | Status: SHIPPED | OUTPATIENT
Start: 2019-12-12 | End: 2020-02-14 | Stop reason: SDUPTHER

## 2020-02-11 RX ORDER — ALENDRONATE SODIUM 70 MG/1
TABLET ORAL
Qty: 12 TABLET | Refills: 1 | Status: SHIPPED | OUTPATIENT
Start: 2020-02-11 | End: 2020-03-02 | Stop reason: SDUPTHER

## 2020-02-13 ENCOUNTER — TELEPHONE (OUTPATIENT)
Dept: INTERNAL MEDICINE CLINIC | Age: 80
End: 2020-02-13

## 2020-02-14 ENCOUNTER — TELEPHONE (OUTPATIENT)
Dept: INTERNAL MEDICINE CLINIC | Age: 80
End: 2020-02-14

## 2020-02-14 RX ORDER — LOSARTAN POTASSIUM AND HYDROCHLOROTHIAZIDE 12.5; 1 MG/1; MG/1
TABLET ORAL
Qty: 90 TABLET | Refills: 1 | Status: SHIPPED | OUTPATIENT
Start: 2020-02-14 | End: 2020-08-17

## 2020-02-14 RX ORDER — METOPROLOL SUCCINATE 100 MG/1
TABLET, EXTENDED RELEASE ORAL
Qty: 90 TABLET | Refills: 1 | Status: SHIPPED | OUTPATIENT
Start: 2020-02-14 | End: 2020-02-16

## 2020-02-16 RX ORDER — METOPROLOL SUCCINATE 100 MG/1
TABLET, EXTENDED RELEASE ORAL
Qty: 90 TABLET | Refills: 1 | Status: SHIPPED | OUTPATIENT
Start: 2020-02-16 | End: 2020-08-17

## 2020-03-02 ENCOUNTER — TELEPHONE (OUTPATIENT)
Dept: INTERNAL MEDICINE CLINIC | Age: 80
End: 2020-03-02

## 2020-03-02 RX ORDER — ALENDRONATE SODIUM 70 MG/1
TABLET ORAL
Qty: 12 TABLET | Refills: 1 | Status: SHIPPED | OUTPATIENT
Start: 2020-03-02 | End: 2020-08-10

## 2020-03-02 NOTE — TELEPHONE ENCOUNTER
Last appointment: 12/12/2019  Next appointment: 6/16/2020  Last refill: script needs to go to mail order

## 2020-06-11 ENCOUNTER — TELEPHONE (OUTPATIENT)
Dept: INTERNAL MEDICINE CLINIC | Age: 80
End: 2020-06-11

## 2020-06-11 ENCOUNTER — VIRTUAL VISIT (OUTPATIENT)
Dept: ENDOCRINOLOGY | Age: 80
End: 2020-06-11
Payer: MEDICARE

## 2020-06-11 PROCEDURE — 99442 PR PHYS/QHP TELEPHONE EVALUATION 11-20 MIN: CPT | Performed by: INTERNAL MEDICINE

## 2020-06-11 ASSESSMENT — ENCOUNTER SYMPTOMS
COUGH: 0
BACK PAIN: 0
PHOTOPHOBIA: 0

## 2020-06-11 NOTE — PROGRESS NOTES
Final    ALT 12/03/2019 12  10 - 40 U/L Final    AST 12/03/2019 12* 15 - 37 U/L Final    Globulin 12/03/2019 2.4  g/dL Final    PTH 12/03/2019 158.9* 14.0 - 72.0 pg/mL Final         Assessment/Plan      1. Hypercalcemia     Murali Julian is a 70 y.o. female was found to have a high calcium  .2  Most likely diagnosis is Primary hyperparathyroidism. She is on HCTZ 12.5 mg daily which can affect calcium levels. Repeat calcium level high with intact PTH. Phos 2.9   Vit D ( 25 hydroxy) 33.5      24 hr urine calcium is 206 which rules out [address]. No surgical indication at this point    Calcium 10.9 in 12/19      Will monitor calcium for now and check it every 6 months     Advised to keep herself hydrated. Will repeat calcium when she sees PCP. 2. Osteopenia. She is on alendronate 70 mg weekly    DEXA scan in 12/19 showed   Osteopenia of total hip with T score -1.8, Z score 0.1 and BMD 0.719. Osteopenia of femoral neck with T score -2.4, Z score -0.2 and BMD 0.582       Comparison with 2017 shows 10.0% increase in bone mineral density of spine and 2.0% decrease in bone mineral density of hip        Repeat DEXA scan in 12/21. 3. HTN   Managed by PCP        Total of 14 minutes spent with patient on phone. RTC 1 year. Sooner if needed.

## 2020-06-11 NOTE — TELEPHONE ENCOUNTER
We can now do audio only AWV, so we can convert that visit. I can also address her chronic issues at that appointment.

## 2020-06-15 NOTE — PROGRESS NOTES
exposure to COVID-19 and provide continuity of care for an established patient. Services were provided through a video synchronous discussion virtually to substitute for in-person clinic visit.

## 2020-06-16 ENCOUNTER — VIRTUAL VISIT (OUTPATIENT)
Dept: INTERNAL MEDICINE CLINIC | Age: 80
End: 2020-06-16
Payer: MEDICARE

## 2020-06-16 PROCEDURE — G0439 PPPS, SUBSEQ VISIT: HCPCS | Performed by: INTERNAL MEDICINE

## 2020-06-16 PROCEDURE — 1123F ACP DISCUSS/DSCN MKR DOCD: CPT | Performed by: INTERNAL MEDICINE

## 2020-06-16 PROCEDURE — 4040F PNEUMOC VAC/ADMIN/RCVD: CPT | Performed by: INTERNAL MEDICINE

## 2020-06-16 ASSESSMENT — PATIENT HEALTH QUESTIONNAIRE - PHQ9
SUM OF ALL RESPONSES TO PHQ9 QUESTIONS 1 & 2: 0
SUM OF ALL RESPONSES TO PHQ QUESTIONS 1-9: 0
1. LITTLE INTEREST OR PLEASURE IN DOING THINGS: 0
SUM OF ALL RESPONSES TO PHQ QUESTIONS 1-9: 0
2. FEELING DOWN, DEPRESSED OR HOPELESS: 0

## 2020-06-16 ASSESSMENT — LIFESTYLE VARIABLES: HOW OFTEN DO YOU HAVE A DRINK CONTAINING ALCOHOL: 0

## 2020-06-16 NOTE — PATIENT INSTRUCTIONS
Personalized Preventive Plan for Felipa Jin - 6/16/2020  Medicare offers a range of preventive health benefits. Some of the tests and screenings are paid in full while other may be subject to a deductible, co-insurance, and/or copay. Some of these benefits include a comprehensive review of your medical history including lifestyle, illnesses that may run in your family, and various assessments and screenings as appropriate. After reviewing your medical record and screening and assessments performed today your provider may have ordered immunizations, labs, imaging, and/or referrals for you. A list of these orders (if applicable) as well as your Preventive Care list are included within your After Visit Summary for your review. Other Preventive Recommendations:    · A preventive eye exam performed by an eye specialist is recommended every 1-2 years to screen for glaucoma; cataracts, macular degeneration, and other eye disorders. · A preventive dental visit is recommended every 6 months. · Try to get at least 150 minutes of exercise per week or 10,000 steps per day on a pedometer . · Order or download the FREE \"Exercise & Physical Activity: Your Everyday Guide\" from The [employer]. Call 5-411.857.9794 or search The [employer] online. · You need 7775-2149 mg of calcium and 3943-6662 IU of vitamin D per day. It is possible to meet your calcium requirement with diet alone, but a vitamin D supplement is usually necessary to meet this goal.  · When exposed to the sun, use a sunscreen that protects against both UVA and UVB radiation with an SPF of 30 or greater. Reapply every 2 to 3 hours or after sweating, drying off with a towel, or swimming. · Always wear a seat belt when traveling in a car. Always wear a helmet when riding a bicycle or motorcycle.

## 2020-06-16 NOTE — PROGRESS NOTES
Medicare Annual Wellness Visit  Are Name: Verna Valladares Date: 2020   MRN: [id number] Sex: Female   Age: 70 y.o. Ethnicity: Non-/Non    : 1940 Race: Samantha Reed is here for Medicare AWV    Screenings for behavioral, psychosocial and functional/safety risks, and cognitive dysfunction are all negative except as indicated below. These results, as well as other patient data from the [address] form, are documented in Flowsheets linked to this Encounter. Allergies   Allergen Reactions    Amoxicillin-Pot Clavulanate Diarrhea       Prior to Visit Medications    Medication Sig Taking? Authorizing Provider   alendronate (FOSAMAX) 70 MG tablet TAKE 1 TABLET EVERY 7 DAYS WITH WATER, 30 MINUTES BEFORE FIRST MEAL, REMAIN UPRIGHT FOR 30 MINUTES Yes Cynthia Vaughn MD   metoprolol succinate (TOPROL XL) 100 MG extended release tablet TAKE 1 TABLET BY MOUTH EVERY DAY Yes Cynthia Vaughn MD   losartan-hydrochlorothiazide (HYZAAR) 100-12.5 MG per tablet TAKE 1 TABLET BY MOUTH EVERY DAY Yes Cynthia Vaughn MD   cetirizine (ZYRTEC ALLERGY) 10 MG tablet Take 1 tablet by mouth daily Yes Cynthia Vaughn MD   triamcinolone (NASACORT AQ) 55 MCG/ACT nasal inhaler 2 sprays by Nasal route daily Yes Cynthia Vaughn MD   Cholecalciferol (VITAMIN D) 1000 UNIT TABS Take 1 tablet by mouth daily.  Yes Historical Provider, MD       Past Medical History:   Diagnosis Date    Allergic rhinitis 2016    Hypertension     Low back pain     Medial meniscus tear     Left knee    Meningioma (HCC)     T11-12    Osteopenia        Past Surgical History:   Procedure Laterality Date    DILATION AND CURETTAGE OF UTERUS      HYSTERECTOMY, VAGINAL  1985    LIPOMA RESECTION      Thigh, buttock    SHOULDER ARTHROSCOPY      Right subacromial decompression    THORACIC SPINE SURGERY      Meningioma excision T11-12       Family History   Problem Relation Age of Onset    Live (Zostavax) 10/08/2007    Zoster Recombinant (Shingrix) 05/01/2019        Health Maintenance   Topic Date Due    DTaP/Tdap/Td vaccine (2 - Td) 10/08/2017    Annual Wellness Visit (AWV)  06/13/2019    Shingles Vaccine (3 of 3) 06/26/2019    Potassium monitoring  12/03/2020    Creatinine monitoring  12/03/2020    Flu vaccine  Completed    Pneumococcal 65+ years Vaccine  Completed    Hepatitis A vaccine  Aged Out    Hepatitis B vaccine  Aged Out    Hib vaccine  Aged Out    Meningococcal (ACWY) vaccine  Aged Out     Recommendations for Amen. Due: see orders and patient instructions/AVS.  . Recommended screening schedule for the next 5-10 years is provided to the patient in written form: see Patient Instructions/AVS.    Aixa Vargas was seen today for medicare awv. Diagnoses and all orders for this visit:    Routine general medical examination at a health care facility  - Tetanus booster per pharmacy  - Shingrix completed- date of last injection requested per pharmacy    Essential hypertension  Well-controlled. Continue current medications. Chronic non-seasonal allergic rhinitis  Doing well on daily Zyrtec and nasal steroid- continue. Mass on back  Asymptomatic. Work-up for recurrent meningioma negative per MRI and neurosurgical consultation. She will follow up with general surgery if mass becomes larger or changes. B12 deficiency  Taking supplement consistently. -     Vitamin B12; Future      Sal Hidalgo is a 70 y.o. female being evaluated by a Virtual Visit (phone) encounter to address concerns as mentioned above. A caregiver was present when appropriate. Due to this being a TeleHealth encounter ([facility] public health emergency), evaluation of the following organ systems was limited: Vitals/Constitutional/EENT/Resp/CV/GI//MS/Neuro/Skin/Heme-Lymph-Imm.   Pursuant to the emergency declaration under the [address] [facility], [address]1 waiver

## 2020-07-15 DIAGNOSIS — E21.3 HYPERPARATHYROIDISM (HCC): ICD-10-CM

## 2020-07-15 DIAGNOSIS — E83.52 HYPERCALCEMIA: ICD-10-CM

## 2020-07-15 DIAGNOSIS — E53.8 B12 DEFICIENCY: ICD-10-CM

## 2020-07-15 LAB
ANION GAP SERPL CALCULATED.3IONS-SCNC: 11 MMOL/L (ref 3–16)
BUN BLDV-MCNC: 22 MG/DL (ref 7–20)
CALCIUM SERPL-MCNC: 10.2 MG/DL (ref 8.3–10.6)
CHLORIDE BLD-SCNC: 100 MMOL/L (ref 99–110)
CO2: 25 MMOL/L (ref 21–32)
CREAT SERPL-MCNC: 0.6 MG/DL (ref 0.6–1.2)
GFR AFRICAN AMERICAN: >60
GFR NON-AFRICAN AMERICAN: >60
GLUCOSE BLD-MCNC: 90 MG/DL (ref 70–99)
POTASSIUM SERPL-SCNC: 4.2 MMOL/L (ref 3.5–5.1)
SODIUM BLD-SCNC: 136 MMOL/L (ref 136–145)
VITAMIN B-12: 373 PG/ML (ref 211–911)

## 2020-08-10 RX ORDER — ALENDRONATE SODIUM 70 MG/1
TABLET ORAL
Qty: 12 TABLET | Refills: 1 | Status: SHIPPED | OUTPATIENT
Start: 2020-08-10 | End: 2021-02-03 | Stop reason: SDUPTHER

## 2020-08-17 RX ORDER — LOSARTAN POTASSIUM AND HYDROCHLOROTHIAZIDE 12.5; 1 MG/1; MG/1
TABLET ORAL
Qty: 90 TABLET | Refills: 1 | Status: SHIPPED | OUTPATIENT
Start: 2020-08-17 | End: 2021-02-01 | Stop reason: SDUPTHER

## 2020-08-17 RX ORDER — METOPROLOL SUCCINATE 100 MG/1
TABLET, EXTENDED RELEASE ORAL
Qty: 90 TABLET | Refills: 1 | Status: SHIPPED | OUTPATIENT
Start: 2020-08-17 | End: 2021-02-01 | Stop reason: SDUPTHER

## 2020-12-21 NOTE — PROGRESS NOTES
2020    TELEHEALTH EVALUATION -- Audio/Visual (During [facility]-26 public health emergency)    HPI:  Richard Mensah (:  1940) has requested an audio/video evaluation for the following concern(s):    Hypertension:  Home blood pressure monitorins/70-80. She is adherent to a low sodium diet. Patient denies chest pain, shortness of breath, dizziness and palpitations.  Antihypertensive medication side effects: none.  Use of agents associated with hypertension: NSAIDS- occasional ibuprofen.  Weight stable. Vitamin B12 deficiency:       Hyperparathyroidism/osteopenia:  Last endocrinology appointment 20- dexa 12/10/19 showed 10% improvement at spine, unchanged at hip. Tolerating Fosamax. Last calcium 10.2- 7/15/20. Prior to Visit Medications    Medication Sig Taking? Authorizing Provider   losartan-hydroCHLOROthiazide (HYZAAR) 100-12.5 MG per tablet TAKE 1 TABLET DAILY  Familia Blood MD   metoprolol succinate (TOPROL XL) 100 MG extended release tablet TAKE 1 TABLET DAILY  Familia Blood MD   alendronate (FOSAMAX) 70 MG tablet TAKE 1 TABLET EVERY 7 DAYS WITH WATER 30 MINUTES      BEFORE FIRST MEAL REMAIN   UPRIGHT FOR 30 MINUTES  Familia Blood MD   cetirizine (ZYRTEC ALLERGY) 10 MG tablet Take 1 tablet by mouth daily  Familia Blood MD   triamcinolone (NASACORT AQ) 55 MCG/ACT nasal inhaler 2 sprays by Nasal route daily  Familia Blood MD   Cholecalciferol (VITAMIN D) 1000 UNIT TABS Take 1 tablet by mouth daily.   Historical Provider, MD       Wt Readings from Last 3 Encounters:   19 163 lb (73.9 kg)   19 164 lb 6.4 oz (74.6 kg)   10/22/19 166 lb (75.3 kg)     BP Readings from Last 3 Encounters:   19 136/78   19 (!) 141/81   10/22/19 135/60       Patient-Reported Vitals 2020   Patient-Reported Weight 165lb   Patient-Reported Systolic 798   Patient-Reported Diastolic 85   Patient-Reported Pulse 55         Review of Systems:   As documented in HPI Physical Exam     Lab Results   Component Value Date    HDL 72 (H) 07/12/2016    LDLCALC 107 (H) 07/12/2016     Lab Results   Component Value Date    GLUF 92 06/13/2019     Lab Results   Component Value Date     07/15/2020    K 4.2 07/15/2020    BUN 22 (H) 07/15/2020    CREATININE 0.6 07/15/2020    LABGLOM >60 07/15/2020    GFRAA >60 07/15/2020    CALCIUM 10.2 07/15/2020    VITD25 33.5 08/15/2019     Lab Results   Component Value Date    ALT 12 12/03/2019    AST 12 (L) 12/03/2019     Lab Results   Component Value Date    HGB 14.3 07/12/2016     Lab Results   Component Value Date    TSHREFLEX 1.84 12/20/2018    TSH 2.04 10/03/2017           ASSESSMENT/PLAN:  There are no diagnoses linked to this encounter. No follow-ups on file. An  electronic signature was used to authenticate this note. --Jennifer Garcia MD on 12/21/2020 at 2:12 PM    Pursuant to the emergency declaration under the [facility], [facility]5 waiver authority and the [employer] Act, this Virtual  Visit was conducted, with patient's consent, to reduce the patient's risk of exposure to COVID-19 and provide continuity of care for an established patient. Services were provided through a video synchronous discussion virtually to substitute for in-person clinic visit.

## 2020-12-22 ENCOUNTER — VIRTUAL VISIT (OUTPATIENT)
Dept: INTERNAL MEDICINE CLINIC | Age: 80
End: 2020-12-22
Payer: MEDICARE

## 2020-12-22 PROCEDURE — 99443 PR PHYS/QHP TELEPHONE EVALUATION 21-30 MIN: CPT | Performed by: INTERNAL MEDICINE

## 2020-12-22 NOTE — PROGRESS NOTES
Ailyn Puentes is a 70 y.o. female evaluated via telephone on 2020. Consent:  She and/or health care decision maker is aware that that she may receive a bill for this telephone service, depending on her insurance coverage, and has provided verbal consent to proceed: Yes    Prior to Visit Medications    Medication Sig Taking? Authorizing Provider   losartan-hydroCHLOROthiazide (HYZAAR) 100-12.5 MG per tablet TAKE 1 TABLET DAILY Yes Lian Roman MD   metoprolol succinate (TOPROL XL) 100 MG extended release tablet TAKE 1 TABLET DAILY Yes Lian Roman MD   alendronate (FOSAMAX) 70 MG tablet TAKE 1 TABLET EVERY 7 DAYS WITH WATER 30 MINUTES      BEFORE FIRST MEAL REMAIN   UPRIGHT FOR 30 MINUTES Yes Lian Roman MD   cetirizine (ZYRTEC ALLERGY) 10 MG tablet Take 1 tablet by mouth daily Yes Lian Roman MD   triamcinolone (NASACORT AQ) 55 MCG/ACT nasal inhaler 2 sprays by Nasal route daily Yes Lian Roman MD   Cholecalciferol (VITAMIN D) 1000 UNIT TABS Take 1 tablet by mouth daily. Yes Historical Provider, MD       Wt Readings from Last 3 Encounters:   19 163 lb (73.9 kg)   19 164 lb 6.4 oz (74.6 kg)   10/22/19 166 lb (75.3 kg)     BP Readings from Last 3 Encounters:   19 136/78   19 (!) 141/81   10/22/19 135/60       Patient-Reported Vitals 2020   Patient-Reported Weight 168lb   Patient-Reported Systolic 418   Patient-Reported Diastolic 81   Patient-Reported Pulse -         Documentation:  I communicated with the patient and/or health care decision maker about the following issues:    Hypertension:  Home blood pressure monitorins/low 80s. She is adherent to a low sodium diet. Patient denies chest pain, shortness of breath, dizziness and palpitations.  Antihypertensive medication side effects: none.  Use of agents associated with hypertension: NSAIDS- occasional ibuprofen.  Weight stable. Vitamin B12 deficiency:   Taking supplement consistently.   Energy level good. Hyperparathyroidism/osteopenia:  Last endocrinology appointment 6/11/20- dexa 12/10/19 showed 10% improvement at spine, unchanged at hip. Tolerating Fosamax. Last calcium 10.2- 7/15/20. Details of this discussion including any medical advice provided:  1. Essential hypertension  Well-controlled. Continue current medications. - Comprehensive Metabolic Panel, Fasting; Future    2. B12 deficiency  Recheck level on current dose of supplement. - Vitamin B12; Future    3. Hyperparathyroidism ([address]  Stable. Continue regular follow up with endocrinology. 4. Osteopenia of both thighs  Stable. Continue vitamin D supplement at current dose, high calcium diet and weight-bearing exercise. Next Dexa due 12/21,      Return in about 6 months (around 6/22/2021) for 30 MIN F/U- Video. I affirm this is a Patient Initiated Episode with a Patient who has not had a related appointment within my department in the past 7 days or scheduled within the next 24 hours. Patient identification was verified at the start of the visit: Yes    Total Time: minutes: 21-30 minutes    Note: not billable if this call serves to triage the patient into an appointment for the relevant concern    An  electronic signature was used to authenticate this note.     --Sherly Barros MD on 12/22/2020 at 9:04 AM

## 2021-02-01 ENCOUNTER — TELEPHONE (OUTPATIENT)
Dept: INTERNAL MEDICINE CLINIC | Age: 81
End: 2021-02-01

## 2021-02-01 DIAGNOSIS — I10 ESSENTIAL HYPERTENSION: ICD-10-CM

## 2021-02-01 RX ORDER — METOPROLOL SUCCINATE 100 MG/1
TABLET, EXTENDED RELEASE ORAL
Qty: 90 TABLET | Refills: 1 | Status: SHIPPED | OUTPATIENT
Start: 2021-02-01 | End: 2021-07-08

## 2021-02-01 RX ORDER — LOSARTAN POTASSIUM AND HYDROCHLOROTHIAZIDE 12.5; 1 MG/1; MG/1
TABLET ORAL
Qty: 90 TABLET | Refills: 1 | Status: SHIPPED | OUTPATIENT
Start: 2021-02-01 | End: 2021-07-08

## 2021-02-01 NOTE — TELEPHONE ENCOUNTER
Patient is requesting refills of losartan-hydroCHLOROthiazide (HYZAAR) 100-12.5 MG per tablet  metoprolol succinate (TOPROL XL) 100 MG extended release tablet  alendronate (FOSAMAX) 70 MG tablet to be sent to [place] [address] [phone]

## 2021-02-03 RX ORDER — ALENDRONATE SODIUM 70 MG/1
TABLET ORAL
Qty: 12 TABLET | Refills: 1 | Status: SHIPPED | OUTPATIENT
Start: 2021-02-03 | End: 2021-06-28

## 2021-06-07 ENCOUNTER — TELEPHONE (OUTPATIENT)
Dept: INTERNAL MEDICINE CLINIC | Age: 81
End: 2021-06-07

## 2021-06-07 ENCOUNTER — OFFICE VISIT (OUTPATIENT)
Dept: INTERNAL MEDICINE CLINIC | Age: 81
End: 2021-06-07
Payer: MEDICARE

## 2021-06-07 VITALS
WEIGHT: 165 LBS | TEMPERATURE: 99 F | OXYGEN SATURATION: 98 % | RESPIRATION RATE: 16 BRPM | HEIGHT: 63 IN | DIASTOLIC BLOOD PRESSURE: 78 MMHG | SYSTOLIC BLOOD PRESSURE: 130 MMHG | HEART RATE: 59 BPM | BODY MASS INDEX: 29.23 KG/M2

## 2021-06-07 DIAGNOSIS — L02.619 FOOT ABSCESS: Primary | ICD-10-CM

## 2021-06-07 PROCEDURE — G8399 PT W/DXA RESULTS DOCUMENT: HCPCS | Performed by: INTERNAL MEDICINE

## 2021-06-07 PROCEDURE — 1123F ACP DISCUSS/DSCN MKR DOCD: CPT | Performed by: INTERNAL MEDICINE

## 2021-06-07 PROCEDURE — 99213 OFFICE O/P EST LOW 20 MIN: CPT | Performed by: INTERNAL MEDICINE

## 2021-06-07 PROCEDURE — 4040F PNEUMOC VAC/ADMIN/RCVD: CPT | Performed by: INTERNAL MEDICINE

## 2021-06-07 PROCEDURE — 1090F PRES/ABSN URINE INCON ASSESS: CPT | Performed by: INTERNAL MEDICINE

## 2021-06-07 PROCEDURE — G8427 DOCREV CUR MEDS BY ELIG CLIN: HCPCS | Performed by: INTERNAL MEDICINE

## 2021-06-07 PROCEDURE — G8417 CALC BMI ABV UP PARAM F/U: HCPCS | Performed by: INTERNAL MEDICINE

## 2021-06-07 PROCEDURE — 1036F TOBACCO NON-USER: CPT | Performed by: INTERNAL MEDICINE

## 2021-06-07 RX ORDER — DOXYCYCLINE HYCLATE 100 MG
100 TABLET ORAL 2 TIMES DAILY
Qty: 14 TABLET | Refills: 0 | Status: SHIPPED | OUTPATIENT
Start: 2021-06-07 | End: 2021-06-14

## 2021-06-07 SDOH — ECONOMIC STABILITY: HOUSING INSECURITY
IN THE LAST 12 MONTHS, WAS THERE A TIME WHEN YOU DID NOT HAVE A STEADY PLACE TO SLEEP OR SLEPT IN A SHELTER (INCLUDING NOW)?: NO

## 2021-06-07 SDOH — ECONOMIC STABILITY: INCOME INSECURITY: IN THE LAST 12 MONTHS, WAS THERE A TIME WHEN YOU WERE NOT ABLE TO PAY THE MORTGAGE OR RENT ON TIME?: NO

## 2021-06-07 SDOH — ECONOMIC STABILITY: TRANSPORTATION INSECURITY
IN THE PAST 12 MONTHS, HAS LACK OF TRANSPORTATION KEPT YOU FROM MEETINGS, WORK, OR FROM GETTING THINGS NEEDED FOR DAILY LIVING?: NO

## 2021-06-07 SDOH — ECONOMIC STABILITY: TRANSPORTATION INSECURITY
IN THE PAST 12 MONTHS, HAS THE LACK OF TRANSPORTATION KEPT YOU FROM MEDICAL APPOINTMENTS OR FROM GETTING MEDICATIONS?: NO

## 2021-06-07 SDOH — ECONOMIC STABILITY: FOOD INSECURITY: WITHIN THE PAST 12 MONTHS, YOU WORRIED THAT YOUR FOOD WOULD RUN OUT BEFORE YOU GOT MONEY TO BUY MORE.: NEVER TRUE

## 2021-06-07 SDOH — ECONOMIC STABILITY: FOOD INSECURITY: WITHIN THE PAST 12 MONTHS, THE FOOD YOU BOUGHT JUST DIDN'T LAST AND YOU DIDN'T HAVE MONEY TO GET MORE.: NEVER TRUE

## 2021-06-07 ASSESSMENT — PATIENT HEALTH QUESTIONNAIRE - PHQ9
SUM OF ALL RESPONSES TO PHQ QUESTIONS 1-9: 0
SUM OF ALL RESPONSES TO PHQ QUESTIONS 1-9: 0
2. FEELING DOWN, DEPRESSED OR HOPELESS: 0
1. LITTLE INTEREST OR PLEASURE IN DOING THINGS: 0
SUM OF ALL RESPONSES TO PHQ QUESTIONS 1-9: 0
SUM OF ALL RESPONSES TO PHQ9 QUESTIONS 1 & 2: 0

## 2021-06-07 ASSESSMENT — SOCIAL DETERMINANTS OF HEALTH (SDOH): HOW HARD IS IT FOR YOU TO PAY FOR THE VERY BASICS LIKE FOOD, HOUSING, MEDICAL CARE, AND HEATING?: NOT HARD AT ALL

## 2021-06-07 NOTE — TELEPHONE ENCOUNTER
Patient is requesting to know if she needs to come in today for an appointment or see a Podiatrist.  She states her R foot is swollen, and has puss pockets. She states those pockets need lanced and drained followed by an antibiotic. Aware doctor  is out of the office today    Please contact patient @ phone # provided as to whom she needs to schedule with.

## 2021-06-07 NOTE — PROGRESS NOTES
Maryana Daugherty (:  1940) is a 68 y.o. female,Established patient, here for evaluation of the following chief complaint(s):  Lesion(s) (Patient had athletes foot and scratched which results in some pussy like lesions on the right foot.  )      Vitals:    21 1521   BP: 130/78   Pulse: 59   Resp: 16   Temp: 99 °F (37.2 °C)   SpO2: 98%        ASSESSMENT/PLAN:  1. Foot abscess  Two areas of abscess drained in the office today. We did try to get patient in with podiatry today however they were unable to see her. given referral to podiatry and patient to try to get in ASAP. Patient to start doxy. Patient to call with any worsening symptoms including re-appearance of abscess, enlargement of abscess, worsening swelling or systemic symptoms including fevers. Culture sent. -     doxycycline hyclate (VIBRA-TABS) 100 MG tablet; Take 1 tablet by mouth 2 times daily for 7 days, Disp-14 tablet, R-0Normal  -     Amb External Referral To Podiatry  -     Culture, Aerobic and Anaerobic      No follow-ups on file. SUBJECTIVE/OBJECTIVE:  HPI    Patient is a 69 yo fm with pmhx hyperparathyroidism, HTN, paresthesias, vitamin b12 deficency who presents secondary to multiple abcess on her right foot. Eleno notes that she has had athletes foot on the bottom of her feet. Using an OTC spray. It is not getting better. Now really itching on the bottom. She notes she noticed pustules late saturday. She has used some neomycin with gauz. Tried peroxide. None of this improved her symptoms. She denies any fevers or chills. Has noted the foot to be slightly swollen and red compared to the left. Otherwise feeling well without systemic symptoms. Review of Systems ROS negative except for those noted in the HPI above. Vitals:    21 1521   BP: 130/78   Pulse: 59   Resp: 16   Temp: 99 °F (37.2 °C)   SpO2: 98%         Physical Exam  Constitutional:       General: She is not in acute distress.      Appearance: Normal appearance. She is not ill-appearing. HENT:      Head: Normocephalic and atraumatic. Right Ear: External ear normal.      Left Ear: External ear normal.      Mouth/Throat:      Pharynx: No oropharyngeal exudate or posterior oropharyngeal erythema. Eyes:      General: No scleral icterus. Cardiovascular:      Rate and Rhythm: Normal rate and regular rhythm. Pulses: Normal pulses. Heart sounds: No murmur heard. No friction rub. No gallop. Pulmonary:      Effort: Pulmonary effort is normal. No respiratory distress. Breath sounds: Normal breath sounds. No wheezing, rhonchi or rales. Abdominal:      General: Bowel sounds are normal. There is no distension. Palpations: Abdomen is soft. There is no mass. Tenderness: There is no abdominal tenderness. There is no guarding or rebound. Musculoskeletal:         General: No swelling or tenderness. Normal range of motion. Cervical back: Normal range of motion. No rigidity. No muscular tenderness. Lymphadenopathy:      Cervical: No cervical adenopathy. Skin:     General: Skin is warm. Coloration: Skin is not jaundiced. Findings: Erythema present. No bruising. Comments: abscess with surrounding pustules on the bottom and lateral portion of the right foot. Neurological:      General: No focal deficit present. Mental Status: She is alert and oriented to person, place, and time. Motor: No weakness. Gait: Gait normal.   Psychiatric:         Mood and Affect: Mood normal.         Behavior: Behavior normal.       An electronic signature was used to authenticate this note.     --Saul Nunez DO

## 2021-06-11 LAB
ANAEROBIC CULTURE: NORMAL
GRAM STAIN RESULT: NORMAL
WOUND/ABSCESS: NORMAL

## 2021-06-28 RX ORDER — ALENDRONATE SODIUM 70 MG/1
TABLET ORAL
Qty: 12 TABLET | Refills: 1 | Status: SHIPPED | OUTPATIENT
Start: 2021-06-28 | End: 2021-12-08

## 2021-06-29 ENCOUNTER — OFFICE VISIT (OUTPATIENT)
Dept: ENDOCRINOLOGY | Age: 81
End: 2021-06-29
Payer: MEDICARE

## 2021-06-29 ENCOUNTER — VIRTUAL VISIT (OUTPATIENT)
Dept: INTERNAL MEDICINE CLINIC | Age: 81
End: 2021-06-29
Payer: MEDICARE

## 2021-06-29 VITALS
HEART RATE: 54 BPM | DIASTOLIC BLOOD PRESSURE: 82 MMHG | OXYGEN SATURATION: 96 % | HEIGHT: 63 IN | SYSTOLIC BLOOD PRESSURE: 144 MMHG | WEIGHT: 168.8 LBS | BODY MASS INDEX: 29.91 KG/M2

## 2021-06-29 DIAGNOSIS — Z00.00 ROUTINE GENERAL MEDICAL EXAMINATION AT A HEALTH CARE FACILITY: Primary | ICD-10-CM

## 2021-06-29 DIAGNOSIS — Z11.59 NEED FOR HEPATITIS C SCREENING TEST: ICD-10-CM

## 2021-06-29 DIAGNOSIS — E21.3 HYPERPARATHYROIDISM (HCC): ICD-10-CM

## 2021-06-29 DIAGNOSIS — M89.9 DISORDER OF BONE AND CARTILAGE: ICD-10-CM

## 2021-06-29 DIAGNOSIS — E83.52 HYPERCALCEMIA: Primary | ICD-10-CM

## 2021-06-29 DIAGNOSIS — M85.80 OSTEOPENIA, UNSPECIFIED LOCATION: ICD-10-CM

## 2021-06-29 DIAGNOSIS — M94.9 DISORDER OF BONE AND CARTILAGE: ICD-10-CM

## 2021-06-29 PROCEDURE — 1036F TOBACCO NON-USER: CPT | Performed by: INTERNAL MEDICINE

## 2021-06-29 PROCEDURE — 4040F PNEUMOC VAC/ADMIN/RCVD: CPT | Performed by: INTERNAL MEDICINE

## 2021-06-29 PROCEDURE — 1090F PRES/ABSN URINE INCON ASSESS: CPT | Performed by: INTERNAL MEDICINE

## 2021-06-29 PROCEDURE — G8417 CALC BMI ABV UP PARAM F/U: HCPCS | Performed by: INTERNAL MEDICINE

## 2021-06-29 PROCEDURE — G8399 PT W/DXA RESULTS DOCUMENT: HCPCS | Performed by: INTERNAL MEDICINE

## 2021-06-29 PROCEDURE — 99214 OFFICE O/P EST MOD 30 MIN: CPT | Performed by: INTERNAL MEDICINE

## 2021-06-29 PROCEDURE — G8427 DOCREV CUR MEDS BY ELIG CLIN: HCPCS | Performed by: INTERNAL MEDICINE

## 2021-06-29 PROCEDURE — 1123F ACP DISCUSS/DSCN MKR DOCD: CPT | Performed by: INTERNAL MEDICINE

## 2021-06-29 PROCEDURE — G0439 PPPS, SUBSEQ VISIT: HCPCS | Performed by: INTERNAL MEDICINE

## 2021-06-29 RX ORDER — UBIDECARENONE 75 MG
100 CAPSULE ORAL DAILY
COMMUNITY

## 2021-06-29 ASSESSMENT — LIFESTYLE VARIABLES
AUDIT-C TOTAL SCORE: 1
HOW OFTEN DO YOU HAVE SIX OR MORE DRINKS ON ONE OCCASION: 0
HAS A RELATIVE, FRIEND, DOCTOR, OR ANOTHER HEALTH PROFESSIONAL EXPRESSED CONCERN ABOUT YOUR DRINKING OR SUGGESTED YOU CUT DOWN: 0
HOW OFTEN DURING THE LAST YEAR HAVE YOU HAD A FEELING OF GUILT OR REMORSE AFTER DRINKING: 0
HOW OFTEN DURING THE LAST YEAR HAVE YOU FOUND THAT YOU WERE NOT ABLE TO STOP DRINKING ONCE YOU HAD STARTED: 0
HOW OFTEN DO YOU HAVE A DRINK CONTAINING ALCOHOL: 1
HOW OFTEN DURING THE LAST YEAR HAVE YOU FAILED TO DO WHAT WAS NORMALLY EXPECTED FROM YOU BECAUSE OF DRINKING: 0
HOW OFTEN DURING THE LAST YEAR HAVE YOU BEEN UNABLE TO REMEMBER WHAT HAPPENED THE NIGHT BEFORE BECAUSE YOU HAD BEEN DRINKING: 0
HOW MANY STANDARD DRINKS CONTAINING ALCOHOL DO YOU HAVE ON A TYPICAL DAY: 0
HOW OFTEN DURING THE LAST YEAR HAVE YOU NEEDED AN ALCOHOLIC DRINK FIRST THING IN THE MORNING TO GET YOURSELF GOING AFTER A NIGHT OF HEAVY DRINKING: 0
HAVE YOU OR SOMEONE ELSE BEEN INJURED AS A RESULT OF YOUR DRINKING: 0
AUDIT TOTAL SCORE: 1

## 2021-06-29 ASSESSMENT — PATIENT HEALTH QUESTIONNAIRE - PHQ9
1. LITTLE INTEREST OR PLEASURE IN DOING THINGS: 0
2. FEELING DOWN, DEPRESSED OR HOPELESS: 0
SUM OF ALL RESPONSES TO PHQ QUESTIONS 1-9: 0
SUM OF ALL RESPONSES TO PHQ QUESTIONS 1-9: 0
SUM OF ALL RESPONSES TO PHQ9 QUESTIONS 1 & 2: 0
SUM OF ALL RESPONSES TO PHQ QUESTIONS 1-9: 0

## 2021-06-29 NOTE — PATIENT INSTRUCTIONS
Personalized Preventive Plan for Patricia Carter - 6/29/2021  Medicare offers a range of preventive health benefits. Some of the tests and screenings are paid in full while other may be subject to a deductible, co-insurance, and/or copay. Some of these benefits include a comprehensive review of your medical history including lifestyle, illnesses that may run in your family, and various assessments and screenings as appropriate. After reviewing your medical record and screening and assessments performed today your provider may have ordered immunizations, labs, imaging, and/or referrals for you. A list of these orders (if applicable) as well as your Preventive Care list are included within your After Visit Summary for your review. Other Preventive Recommendations:    · A preventive eye exam performed by an eye specialist is recommended every 1-2 years to screen for glaucoma; cataracts, macular degeneration, and other eye disorders. · A preventive dental visit is recommended every 6 months. · Try to get at least 150 minutes of exercise per week or 10,000 steps per day on a pedometer . · Order or download the FREE \"Exercise & Physical Activity: Your Everyday Guide\" from [employer]. Call 0-905.656.7094 or search [employer] online. · You need 7047-5872 mg of calcium and 5552-5145 IU of vitamin D per day. It is possible to meet your calcium requirement with diet alone, but a vitamin D supplement is usually necessary to meet this goal.  · When exposed to the sun, use a sunscreen that protects against both UVA and UVB radiation with an SPF of 30 or greater. Reapply every 2 to 3 hours or after sweating, drying off with a towel, or swimming. · Always wear a seat belt when traveling in a car. Always wear a helmet when riding a bicycle or motorcycle.

## 2021-06-29 NOTE — PROGRESS NOTES
NARENDRA Hicks is a 68 y.o. female who was seen in a phone visit for a follow-up for management of hypercalcemia. Seen as new patient    Has seen Dr. Leopold Pane    PCP :  Candance Southward, MD       Patient has a PMH of HTN, osteopenia, hypercalcemia. Patient was found to have high calcium in 06/18  Calcium was 10.7    Repeat calcium 10.8 in 06/19    Intact PTH level was 119      No History of kidney stones    She has h/o osteopenia. She is on alendronate 70 mg weekly since 2019. FH of hypercalcemia: No    Dietary ca intake : 600-900 mg daily. Supplemental calcium: She was on calcium 500 mg daily. Stopped in 06/19    Supplemental Vitamin D: 1000 IU daily.      Mild, controlled, no worsening factors    Dexa 2017        L Spine (L 1-4): BMD= 0.916 g/cm2, T-score= -1.2, Z-score= 1.2,   %Change = -1.0%       L Hip: BMD= 0.734 g/cm2, T-score= -1.7, Z-score= 0.1, %Change =   -2.7%   L Femoral Neck: BMD= 0.597 g/cm2, T-score= -2.3, Z-score= -0.2           FRAX REPORT (WHO 10 Year Fracture Risk Assessment):         Hip Fracture Risk = 4.0%, Major Osteoporotic Fracture= 14%       Past Medical History:   Diagnosis Date    Allergic rhinitis 8/25/2016    Hypertension     Low back pain     Medial meniscus tear     Left knee    Meningioma (HCC)     T11-12    Osteopenia      Past Surgical History:   Procedure Laterality Date    DILATION AND CURETTAGE OF UTERUS  1985    HYSTERECTOMY, VAGINAL  1985    LIPOMA RESECTION  1991    Thigh, buttock    SHOULDER ARTHROSCOPY  2000    Right subacromial decompression    THORACIC SPINE SURGERY      Meningioma excision T11-12     Current Outpatient Medications   Medication Sig Dispense Refill    vitamin B-12 (CYANOCOBALAMIN) 100 MCG tablet Take 50 mcg by mouth daily      alendronate (FOSAMAX) 70 MG tablet TAKE 1 TABLET EVERY 7 DAYS WITH WATER 30 MINUTES      BEFORE FIRST MEAL REMAIN   UPRIGHT FOR 30 MINUTES 12 tablet 1    losartan-hydroCHLOROthiazide (HYZAAR) 100-12.5 MG per tablet TAKE 1 TABLET DAILY 90 tablet 1    metoprolol succinate (TOPROL XL) 100 MG extended release tablet TAKE 1 TABLET DAILY 90 tablet 1    cetirizine (ZYRTEC ALLERGY) 10 MG tablet Take 1 tablet by mouth daily      triamcinolone (NASACORT AQ) 55 MCG/ACT nasal inhaler 2 sprays by Nasal route daily      Cholecalciferol (VITAMIN D) 1000 UNIT TABS Take 1 tablet by mouth daily. No current facility-administered medications for this visit. ROS: Scanned, reviewed    Vitals:    06/29/21 1027   BP: (!) 144/82   Pulse: 54   SpO2: 96%       Constitutional: Well-developed, appears stated age, cooperative, in no acute distress  H/E/N/M/T:atraumatic, normocephalic, external ears, nose, lips normal without lesions  Eyes: Lids, lashes, conjunctivae and sclerae normal, No proptosis, no redness  Neck: supple, symmetrical, no swelling  Skin: No obvious rashes or lesions present. Skin and hair texture normal  Psychiatric: Judgement and Insight:  judgement and insight appear normal  Neuro: Normal without focal findings, speech is normal normal, speech is spontaneous  Chest: No labored breathing, no chest deformity, no stridor  Musculoskeletal: No joint deformity, swelling          Lab Results   Component Value Date    CALCIUM 10.2 07/15/2020    CALCIUM 10.7 (H) 12/03/2019    CALCIUM 10.6 10/28/2019    CALCIUM 10.5 08/15/2019    CALCIUM 10.8 (H) 06/13/2019    CALCIUM 10.4 12/20/2018    CALCIUM 10.7 (H) 06/28/2018    CALCIUM 10.2 10/03/2017    CALCIUM 9.9 03/30/2017    CALCIUM 10.0 09/29/2016     Office Visit on 06/07/2021   Component Date Value Ref Range Status    Gram Stain Result 06/07/2021    Final                    Value:1+ WBC's  1+ Epithelial Cells  No organisms seen      WOUND/ABSCESS 06/07/2021 No growth 60 to 72 hours   Final    Anaerobic Culture 06/07/2021 CANCELED   Final    Result canceled by the ancillary. Assessment/Plan      1.  Hypercalcemia     Bossman Rodriguez is a 68 y.o. female was found to have a high calcium  .2  Most likely diagnosis is Primary hyperparathyroidism. She is on HCTZ 12.5 mg daily which can affect calcium levels. Repeat calcium level high with intact PTH. Phos 2.9   Vit D ( 25 hydroxy) 33.5      24 hr urine calcium is 206 which rules out [address]. No surgical indication at this point    Calcium 10.9 in 12/19    Will monitor calcium for now and check it every 6 months     Advised to keep herself hydrated. Will repeat calcium when she sees PCP. 2. Osteopenia. She is on alendronate 70 mg weekly  On it since 12/17    DEXA scan in 12/19 showed   Osteopenia of total hip with T score -1.8, Z score 0.1 and BMD 0.719. Osteopenia of femoral neck with T score -2.4, Z score -0.2 and BMD 0.582       Comparison with 2017 shows 10.0% increase in bone mineral density of spine and 2.0% decrease in bone mineral density of hip        Repeat DEXA scan in 12/21. May consider drug holiday    3.  HTN   Managed by PCP

## 2021-06-29 NOTE — PROGRESS NOTES
Medicare Annual Wellness Visit  Name: Marquita Henry Date: 2021   MRN: [id number] Sex: Female   Age: 68 y.o. Ethnicity: Non-/Non    : 1940 Race: Jack Trevino is here for Medicare AWV    Screenings for behavioral, psychosocial and functional/safety risks, and cognitive dysfunction are all negative except as indicated below. These results, as well as other patient data from the [address] form, are documented in Flowsheets linked to this Encounter. Allergies   Allergen Reactions    Amoxicillin-Pot Clavulanate Diarrhea         Prior to Visit Medications    Medication Sig Taking? Authorizing Provider   vitamin B-12 (CYANOCOBALAMIN) 100 MCG tablet Take 1,000 mcg by mouth daily  Yes Historical Provider, MD   alendronate (FOSAMAX) 70 MG tablet TAKE 1 TABLET EVERY 7 DAYS WITH WATER 30 MINUTES      BEFORE FIRST MEAL REMAIN   UPRIGHT FOR 30 MINUTES Yes Deonte Do MD   losartan-hydroCHLOROthiazide (HYZAAR) 100-12.5 MG per tablet TAKE 1 TABLET DAILY Yes Deonte Do MD   metoprolol succinate (TOPROL XL) 100 MG extended release tablet TAKE 1 TABLET DAILY Yes Deonte Do MD   cetirizine (ZYRTEC ALLERGY) 10 MG tablet Take 1 tablet by mouth daily Yes Deonte Do MD   triamcinolone (NASACORT AQ) 55 MCG/ACT nasal inhaler 2 sprays by Nasal route daily Yes Deonte Do MD   Cholecalciferol (VITAMIN D) 1000 UNIT TABS Take 1 tablet by mouth daily.  Yes Historical Provider, MD         Past Medical History:   Diagnosis Date    Allergic rhinitis 2016    Hypertension     Low back pain     Medial meniscus tear     Left knee    Meningioma (HCC)     T11-12    Osteopenia        Past Surgical History:   Procedure Laterality Date    DILATION AND CURETTAGE OF UTERUS      HYSTERECTOMY, VAGINAL  1985    LIPOMA RESECTION      Thigh, buttock    SHOULDER ARTHROSCOPY      Right subacromial decompression    THORACIC SPINE SURGERY Meningioma excision T11-12         Family History   Problem Relation Age of Onset    Hypertension Mother     Heart Failure Mother     Prostate Cancer Father     Breast Cancer Sister         Age 58    Breast Cancer Maternal Grandmother         Post-menopausal    Breast Cancer Paternal Grandmother         Post-menopausal       CareTeam (Including outside providers/suppliers regularly involved in providing care):   Patient Care Team:  Elly Granados MD as PCP - Kristina Roy MD as PCP - [facility] Empaneled Provider  Ravi Boone MD as Consulting Physician (Orthopedic Surgery)  Jayden Miguel as Consulting Physician (Ophthalmology)  Melina Carter MD as Consulting Physician (Dermatology)  Norbert Manley MD as Surgeon (General Surgery)    Wt Readings from Last 3 Encounters:   06/29/21 168 lb 12.8 oz (76.6 kg)   06/07/21 165 lb (74.8 kg)   12/12/19 163 lb (73.9 kg)     Patient-Reported Vitals 6/29/2021   Patient-Reported Weight 168 lb   Patient-Reported Height 63\"   Patient-Reported Systolic 017   Patient-Reported Diastolic 82   Patient-Reported Pulse 54      There is no height or weight on file to calculate BMI. Based upon direct observation of the patient, evaluation of cognition reveals recent and remote memory intact. Physical Exam  Constitutional:       General: She is not in acute distress. Appearance: She is well-developed. HENT:      Head: Normocephalic and atraumatic. Mouth/Throat:      Lips: No lesions. Neck:      Comments: No visible mass  Pulmonary:      Effort: Pulmonary effort is normal. No respiratory distress. Skin:     Comments: No rash, erythema or other discoloration on facial skin and exposed areas of neck and upper extremites    Neurological:      Mental Status: She is alert.       Comments: No facial asymmetry (cranial nerve 7 motor function) or gaze palsy   Psychiatric:         Attention and Perception: Attention normal.         Mood and Affect: Mood normal.         Speech: Speech normal.         Behavior: Behavior normal.         Thought Content: Thought content normal.         Cognition and Memory: Cognition normal.       Patient's complete Health Risk Assessment and screening values have been reviewed and are found in Flowsheets. The following problems were reviewed today and where indicated follow up appointments were made and/or referrals ordered. Positive Risk Factor Screenings with Interventions:            General Health and ACP:  General  In general, how would you say your health is?: Good  In the past 7 days, have you experienced any of the following?  New or Increased Pain, New or Increased Fatigue, Loneliness, Social Isolation, Stress or Anger?: None of These  Do you get the social and emotional support that you need?: Yes  Do you have a Living Will?: Yes  Advance Directives     Power of  Living Will ACP-Advance Directive ACP-Power of     Not on File Not on File Not on File Not on File      General Health Risk Interventions:  · No Living Will: ACP documents already completed- patient asked to provide copy to the office        Personalized Preventive Plan   Current Health Maintenance Status  Immunization History   Administered Date(s) Administered    COVID-19, Moderna, PF, 100mcg/0.5mL 01/29/2021    Influenza 10/28/2010, 10/20/2011    Influenza Virus Vaccine 11/14/2013, 11/30/2016    Influenza, High Dose (Fluzone 65 yrs and older) 11/16/2012, 12/02/2015, 11/20/2018, 10/13/2020    Pneumococcal Conjugate 13-valent ([id number]) 05/14/2015    Pneumococcal Polysaccharide ([id number]) 12/15/2009    Td, unspecified formulation 04/01/2001    Tdap (Boostrix, Adacel) 10/08/2007    Zoster Live (Zostavax) 10/08/2007    Zoster Recombinant (Shingrix) 05/01/2019, 07/11/2019        Health Maintenance   Topic Date Due    Hepatitis C screen  Never done    DTaP/Tdap/Td vaccine (2 - Td or Tdap) 10/08/2017    Annual Wellness Visit (AWV) through a video synchronous discussion virtually to substitute for in-person clinic visit. Patient and provider were located at their individual homes. --Shayy Sanches MD on 6/29/2021 at 1:20 PM    An electronic signature was used to authenticate this note.

## 2021-07-08 DIAGNOSIS — I10 ESSENTIAL HYPERTENSION: ICD-10-CM

## 2021-07-08 RX ORDER — METOPROLOL SUCCINATE 100 MG/1
TABLET, EXTENDED RELEASE ORAL
Qty: 90 TABLET | Refills: 1 | Status: SHIPPED | OUTPATIENT
Start: 2021-07-08 | End: 2021-12-09

## 2021-07-08 RX ORDER — LOSARTAN POTASSIUM AND HYDROCHLOROTHIAZIDE 12.5; 1 MG/1; MG/1
TABLET ORAL
Qty: 90 TABLET | Refills: 1 | Status: SHIPPED | OUTPATIENT
Start: 2021-07-08 | End: 2021-12-09

## 2021-12-08 RX ORDER — ALENDRONATE SODIUM 70 MG/1
TABLET ORAL
Qty: 12 TABLET | Refills: 1 | Status: SHIPPED | OUTPATIENT
Start: 2021-12-08 | End: 2022-06-29

## 2021-12-09 DIAGNOSIS — I10 ESSENTIAL HYPERTENSION: ICD-10-CM

## 2021-12-09 RX ORDER — LOSARTAN POTASSIUM AND HYDROCHLOROTHIAZIDE 12.5; 1 MG/1; MG/1
TABLET ORAL
Qty: 90 TABLET | Refills: 1 | Status: SHIPPED | OUTPATIENT
Start: 2021-12-09 | End: 2022-04-07

## 2021-12-09 RX ORDER — METOPROLOL SUCCINATE 100 MG/1
TABLET, EXTENDED RELEASE ORAL
Qty: 90 TABLET | Refills: 1 | Status: SHIPPED | OUTPATIENT
Start: 2021-12-09 | End: 2022-04-07

## 2021-12-21 ENCOUNTER — TELEPHONE (OUTPATIENT)
Dept: ENDOCRINOLOGY | Age: 81
End: 2021-12-21

## 2021-12-21 NOTE — TELEPHONE ENCOUNTER
Patient needs a new Dexa order; the order currently in the system does not pass medical necessity requirements.

## 2021-12-22 DIAGNOSIS — M94.9 DISORDER OF BONE AND CARTILAGE: ICD-10-CM

## 2021-12-22 DIAGNOSIS — Z78.0 MENOPAUSE: ICD-10-CM

## 2021-12-22 DIAGNOSIS — Z13.820 OSTEOPOROSIS SCREENING: Primary | ICD-10-CM

## 2021-12-22 DIAGNOSIS — E83.52 HYPERCALCEMIA: ICD-10-CM

## 2021-12-22 DIAGNOSIS — M89.9 DISORDER OF BONE AND CARTILAGE: ICD-10-CM

## 2021-12-22 DIAGNOSIS — M85.80 OSTEOPENIA, UNSPECIFIED LOCATION: ICD-10-CM

## 2022-01-05 NOTE — PROGRESS NOTES
Date of Visit:  2022    CC: Cassie Duong (: 1940) is a 80 y.o. female, established patient, here for evaluation/re-evaluation of the following medical concerns:    ASSESSMENT/PLAN:  Essential hypertension  Assessment & Plan:  Well-controlled. Continue current antihypertensive regimen. Orders:  -     Basic Metabolic Panel; Future  B12 deficiency  Assessment & Plan:  Asymptomatic. Recheck level on lower dose of supplement. Orders:  -     Vitamin B12; Future  Hyperparathyroidism ([address]  Assessment & Plan:  Asymptomatic. She will keep appointment with endocrinology 22, as scheduled. Osteopenia of both thighs  Assessment & Plan:  Last dexa  improved on weekly alendronate. Repeat scan ordered. Continue vitamin D supplement at current dose and weight-bearing exercise. Avoiding calcium supplements due to hypercalcemia. Orders:  -     DEXA BONE DENSITY AXIAL SKELETON; Future  Postmenopausal  -     DEXA BONE DENSITY AXIAL SKELETON; Future     Return in about 6 months (around 2022) for MEDICARE AWV. Vitals:    22 0853   BP: 132/84   Pulse: 66   Resp: 16   SpO2: 98%   Weight: 167 lb (75.8 kg)   Height: 5' 3\" (1.6 m)      Estimated body mass index is 29.58 kg/m² as calculated from the following:    Height as of this encounter: 5' 3\" (1.6 m). Weight as of this encounter: 167 lb (75.8 kg). Wt Readings from Last 3 Encounters:   22 167 lb (75.8 kg)   21 168 lb 12.8 oz (76.6 kg)   21 165 lb (74.8 kg)     BP Readings from Last 3 Encounters:   22 132/84   21 (!) 144/82   21 130/78     HPI  Hypertension:  Home blood pressure monitorins/low 80s. She is adherent to a low sodium diet. Patient denies chest pain, shortness of breath, dizziness and palpitations.  Antihypertensive medication side effects: none.  Use of agents associated with hypertension: NSAIDS- occasional ibuprofen.  Weight stable.     Vitamin B12 deficiency:  [employer] supplement consistently- cut back to 100 mcg/day. Energy level good, no paresthesias . Last level 1151- 7/21. Hyperparathyroidism/osteopenia:  Last endocrinology appointment 6/29/21- dexa 12/10/19 showed 10% improvement at spine, unchanged at hip. Tolerating Fosamax. Last calcium 10.9, .4- 7/21. No   Surgical indication per Dr. Grealdine Gomez- next appointment 1/11/22. ROS  As documented above    Physical Exam  Constitutional:       General: She is not in acute distress. Appearance: She is well-developed. HENT:      Right Ear: Tympanic membrane, ear canal and external ear normal.      Left Ear: Tympanic membrane, ear canal and external ear normal.      Mouth/Throat:      Pharynx: No oropharyngeal exudate or posterior oropharyngeal erythema. Eyes:      Conjunctiva/sclera: Conjunctivae normal.   Cardiovascular:      Rate and Rhythm: Normal rate and regular rhythm. Heart sounds: Murmur heard. Systolic (RUSB) murmur is present with a grade of 2/6. No friction rub. No gallop. Pulmonary:      Effort: Pulmonary effort is normal. No respiratory distress. Breath sounds: Normal breath sounds. No wheezing, rhonchi or rales. Musculoskeletal:      Right lower leg: No edema. Left lower leg: No edema. Lymphadenopathy:      Cervical: No cervical adenopathy. Skin:     General: Skin is warm and dry. Neurological:      Mental Status: She is alert and oriented to person, place, and time. Gait: Gait normal.   Psychiatric:         Speech: Speech normal.         Behavior: Behavior normal.         Thought Content: Thought content normal.         Judgment: Judgment normal.           --Ava Beck MD on 1/6/2022 at 9:30 AM    An electronic signature was used to authenticate this note.

## 2022-01-06 ENCOUNTER — OFFICE VISIT (OUTPATIENT)
Dept: INTERNAL MEDICINE CLINIC | Age: 82
End: 2022-01-06
Payer: MEDICARE

## 2022-01-06 VITALS
OXYGEN SATURATION: 98 % | RESPIRATION RATE: 16 BRPM | DIASTOLIC BLOOD PRESSURE: 84 MMHG | HEART RATE: 66 BPM | SYSTOLIC BLOOD PRESSURE: 132 MMHG | HEIGHT: 63 IN | WEIGHT: 167 LBS | BODY MASS INDEX: 29.59 KG/M2

## 2022-01-06 DIAGNOSIS — Z78.0 POSTMENOPAUSAL: ICD-10-CM

## 2022-01-06 DIAGNOSIS — E53.8 B12 DEFICIENCY: ICD-10-CM

## 2022-01-06 DIAGNOSIS — E21.3 HYPERPARATHYROIDISM (HCC): ICD-10-CM

## 2022-01-06 DIAGNOSIS — M85.851 OSTEOPENIA OF BOTH THIGHS: ICD-10-CM

## 2022-01-06 DIAGNOSIS — M85.852 OSTEOPENIA OF BOTH THIGHS: ICD-10-CM

## 2022-01-06 DIAGNOSIS — I10 ESSENTIAL HYPERTENSION: Primary | ICD-10-CM

## 2022-01-06 PROCEDURE — 1090F PRES/ABSN URINE INCON ASSESS: CPT | Performed by: INTERNAL MEDICINE

## 2022-01-06 PROCEDURE — 1036F TOBACCO NON-USER: CPT | Performed by: INTERNAL MEDICINE

## 2022-01-06 PROCEDURE — G8399 PT W/DXA RESULTS DOCUMENT: HCPCS | Performed by: INTERNAL MEDICINE

## 2022-01-06 PROCEDURE — 4040F PNEUMOC VAC/ADMIN/RCVD: CPT | Performed by: INTERNAL MEDICINE

## 2022-01-06 PROCEDURE — 1123F ACP DISCUSS/DSCN MKR DOCD: CPT | Performed by: INTERNAL MEDICINE

## 2022-01-06 PROCEDURE — G8417 CALC BMI ABV UP PARAM F/U: HCPCS | Performed by: INTERNAL MEDICINE

## 2022-01-06 PROCEDURE — 99214 OFFICE O/P EST MOD 30 MIN: CPT | Performed by: INTERNAL MEDICINE

## 2022-01-06 PROCEDURE — G8427 DOCREV CUR MEDS BY ELIG CLIN: HCPCS | Performed by: INTERNAL MEDICINE

## 2022-01-06 PROCEDURE — G8484 FLU IMMUNIZE NO ADMIN: HCPCS | Performed by: INTERNAL MEDICINE

## 2022-01-06 NOTE — ASSESSMENT & PLAN NOTE
Last dexa 12/19 improved on weekly alendronate. Repeat scan ordered. Continue vitamin D supplement at current dose and weight-bearing exercise. Avoiding calcium supplements due to hypercalcemia.

## 2022-01-11 ENCOUNTER — OFFICE VISIT (OUTPATIENT)
Dept: ENDOCRINOLOGY | Age: 82
End: 2022-01-11
Payer: MEDICARE

## 2022-01-11 VITALS
SYSTOLIC BLOOD PRESSURE: 155 MMHG | WEIGHT: 168 LBS | BODY MASS INDEX: 29.77 KG/M2 | HEIGHT: 63 IN | HEART RATE: 55 BPM | OXYGEN SATURATION: 96 % | DIASTOLIC BLOOD PRESSURE: 72 MMHG

## 2022-01-11 DIAGNOSIS — E83.52 HYPERCALCEMIA: Primary | ICD-10-CM

## 2022-01-11 DIAGNOSIS — M85.80 OSTEOPENIA, UNSPECIFIED LOCATION: ICD-10-CM

## 2022-01-11 PROCEDURE — 4040F PNEUMOC VAC/ADMIN/RCVD: CPT | Performed by: INTERNAL MEDICINE

## 2022-01-11 PROCEDURE — 1090F PRES/ABSN URINE INCON ASSESS: CPT | Performed by: INTERNAL MEDICINE

## 2022-01-11 PROCEDURE — 1036F TOBACCO NON-USER: CPT | Performed by: INTERNAL MEDICINE

## 2022-01-11 PROCEDURE — G8399 PT W/DXA RESULTS DOCUMENT: HCPCS | Performed by: INTERNAL MEDICINE

## 2022-01-11 PROCEDURE — G8417 CALC BMI ABV UP PARAM F/U: HCPCS | Performed by: INTERNAL MEDICINE

## 2022-01-11 PROCEDURE — G8484 FLU IMMUNIZE NO ADMIN: HCPCS | Performed by: INTERNAL MEDICINE

## 2022-01-11 PROCEDURE — G8427 DOCREV CUR MEDS BY ELIG CLIN: HCPCS | Performed by: INTERNAL MEDICINE

## 2022-01-11 PROCEDURE — 99214 OFFICE O/P EST MOD 30 MIN: CPT | Performed by: INTERNAL MEDICINE

## 2022-01-11 PROCEDURE — 1123F ACP DISCUSS/DSCN MKR DOCD: CPT | Performed by: INTERNAL MEDICINE

## 2022-01-11 NOTE — PROGRESS NOTES
HPI    Angelina Dennis is a 80 y.o. female who was seen for management of hypercalcemia. Interim:    Dexa pending. Has seen Dr. Juan Cedillo    PCP :  Valerie Courtney MD       Patient has a PMH of HTN, osteopenia, hypercalcemia. Patient was found to have high calcium in 06/18  Calcium was 10.7    Repeat calcium 10.8 in 06/19    Intact PTH level was 119      No History of kidney stones    She has h/o osteopenia. She is on alendronate 70 mg weekly since 2019. FH of hypercalcemia: No    Dietary ca intake : 600-900 mg daily. Supplemental calcium: She was on calcium 500 mg daily. Stopped in 06/19    Supplemental Vitamin D: 1000 IU daily.      Mild, controlled, no worsening factors    Dexa 2017        L Spine (L 1-4): BMD= 0.916 g/cm2, T-score= -1.2, Z-score= 1.2,   %Change = -1.0%       L Hip: BMD= 0.734 g/cm2, T-score= -1.7, Z-score= 0.1, %Change =   -2.7%   L Femoral Neck: BMD= 0.597 g/cm2, T-score= -2.3, Z-score= -0.2           FRAX REPORT (WHO 10 Year Fracture Risk Assessment):         Hip Fracture Risk = 4.0%, Major Osteoporotic Fracture= 14%       Past Medical History:   Diagnosis Date    Allergic rhinitis 8/25/2016    Hypertension     Low back pain     Medial meniscus tear     Left knee    Meningioma (HCC)     T11-12    Osteopenia      Past Surgical History:   Procedure Laterality Date    DILATION AND CURETTAGE OF UTERUS  1985    HYSTERECTOMY, VAGINAL  1985    LIPOMA RESECTION  1991    Thigh, buttock    SHOULDER ARTHROSCOPY  2000    Right subacromial decompression    THORACIC SPINE SURGERY      Meningioma excision T11-12     Current Outpatient Medications   Medication Sig Dispense Refill    losartan-hydroCHLOROthiazide (HYZAAR) 100-12.5 MG per tablet TAKE 1 TABLET DAILY 90 tablet 1    metoprolol succinate (TOPROL XL) 100 MG extended release tablet TAKE 1 TABLET DAILY 90 tablet 1    alendronate (FOSAMAX) 70 MG tablet TAKE 1 TABLET EVERY 7 DAYS WITH WATER 30 MINUTES      BEFORE FIRST MEAL REMAIN   UPRIGHT FOR 30 MINUTES 12 tablet 1    vitamin B-12 (CYANOCOBALAMIN) 100 MCG tablet Take 100 mcg by mouth daily       cetirizine (ZYRTEC ALLERGY) 10 MG tablet Take 1 tablet by mouth daily      triamcinolone (NASACORT AQ) 55 MCG/ACT nasal inhaler 2 sprays by Nasal route daily      Cholecalciferol (VITAMIN D) 1000 UNIT TABS Take 1,000 Units by mouth daily        No current facility-administered medications for this visit. ROS: Scanned, reviewed    Vitals:    01/11/22 1030   BP: (!) 155/72   Pulse: 55   SpO2: 96%       Constitutional: Well-developed, appears stated age, cooperative, in no acute distress  H/E/N/M/T:atraumatic, normocephalic, external ears, nose, lips normal without lesions  Eyes: Lids, lashes, conjunctivae and sclerae normal, No proptosis, no redness  Neck: supple, symmetrical, no swelling  Skin: No obvious rashes or lesions present. Skin and hair texture normal  Psychiatric: Judgement and Insight:  judgement and insight appear normal  Neuro: Normal without focal findings, speech is normal normal, speech is spontaneous  Chest: No labored breathing, no chest deformity, no stridor  Musculoskeletal: No joint deformity, swelling          Lab Results   Component Value Date    CALCIUM 10.9 (H) 07/08/2021    CALCIUM 10.2 07/15/2020    CALCIUM 10.7 (H) 12/03/2019    CALCIUM 10.6 10/28/2019    CALCIUM 10.5 08/15/2019    CALCIUM 10.8 (H) 06/13/2019    CALCIUM 10.4 12/20/2018    CALCIUM 10.7 (H) 06/28/2018    CALCIUM 10.2 10/03/2017    CALCIUM 9.9 03/30/2017     No visits with results within 3 Month(s) from this visit.    Latest known visit with results is:   Orders Only on 07/08/2021   Component Date Value Ref Range Status    PTH 07/08/2021 162.4* 14.0 - 72.0 pg/mL Final    Sodium 07/08/2021 141  136 - 145 mmol/L Final    Potassium 07/08/2021 4.5  3.5 - 5.1 mmol/L Final    Chloride 07/08/2021 103  99 - 110 mmol/L Final    CO2 07/08/2021 30  21 - 32 mmol/L Final    Anion Gap 07/08/2021 8  3 - 16 Final    Glucose 07/08/2021 89  70 - 99 mg/dL Final    BUN 07/08/2021 17  7 - 20 mg/dL Final    CREATININE 07/08/2021 0.7  0.6 - 1.2 mg/dL Final    GFR Non- 07/08/2021 >60  >60 Final    Comment: >60 mL/min/1.73m2 EGFR, calc. for ages 25 and older using the  MDRD formula (not corrected for weight), is valid for stable  renal function.  GFR  07/08/2021 >60  >60 Final    Comment: Chronic Kidney Disease: less than 60 ml/min/1.73 sq.m. Kidney Failure: less than 15 ml/min/1.73 sq.m. Results valid for patients 18 years and older.  Calcium 07/08/2021 10.9* 8.3 - 10.6 mg/dL Final    Hep C Ab Interp 07/08/2021 Non-reactive  Non-reactive Final    Cholesterol, Fasting 07/08/2021 186  0 - 199 mg/dL Final    Triglyceride, Fasting 07/08/2021 119  0 - 150 mg/dL Final    HDL 07/08/2021 67* 40 - 60 mg/dL Final    LDL Calculated 07/08/2021 95  <100 mg/dL Final    VLDL Cholesterol Calculated 07/08/2021 24  Not Established mg/dL Final    Vitamin B-12 07/08/2021 1151* 211 - 911 pg/mL Final    Glucose, Fasting 07/08/2021 89  70 - 99 mg/dL Final    Total Protein 07/08/2021 6.5  6.4 - 8.2 g/dL Final    Albumin 07/08/2021 4.1  3.4 - 5.0 g/dL Final    Albumin/Globulin Ratio 07/08/2021 1.7  1.1 - 2.2 Final    Total Bilirubin 07/08/2021 0.6  0.0 - 1.0 mg/dL Final    Alkaline Phosphatase 07/08/2021 73  40 - 129 U/L Final    ALT 07/08/2021 18  10 - 40 U/L Final    AST 07/08/2021 14* 15 - 37 U/L Final    Globulin 07/08/2021 2.4  g/dL Final         Assessment/Plan      1. Hypercalcemia     Angelina Dennis is a 80 y.o. female was found to have a high calcium  .2  Most likely diagnosis is Primary hyperparathyroidism. She is on HCTZ 12.5 mg daily which can affect calcium levels. Repeat calcium level high with intact PTH. Phos 2.9   Vit D ( 25 hydroxy) 33.5      24 hr urine calcium is 206 which rules out [address].      No surgical indication at this point    Calcium 10.9 in 12/19  Ca 10.9 on 7/21    Will monitor calcium for now and check it every 6 months     Advised to keep herself hydrated. Will repeat calcium when she sees PCP. 2. Osteopenia. She is on alendronate 70 mg weekly  On it since 12/17    DEXA scan in 12/19 showed   Osteopenia of total hip with T score -1.8, Z score 0.1 and BMD 0.719. Osteopenia of femoral neck with T score -2.4, Z score -0.2 and BMD 0.582       Comparison with 2017 shows 10.0% increase in bone mineral density of spine and 2.0% decrease in bone mineral density of hip        Repeat DEXA scan in 12/21. Pending  May consider drug holiday    3.  HTN   Managed by PCP

## 2022-01-27 ENCOUNTER — HOSPITAL ENCOUNTER (OUTPATIENT)
Dept: GENERAL RADIOLOGY | Age: 82
Discharge: HOME | End: 2022-01-27
Payer: MEDICARE

## 2022-01-27 DIAGNOSIS — M85.851 OSTEOPENIA OF BOTH THIGHS: ICD-10-CM

## 2022-01-27 DIAGNOSIS — M85.852 OSTEOPENIA OF BOTH THIGHS: ICD-10-CM

## 2022-01-27 DIAGNOSIS — Z78.0 POSTMENOPAUSAL: ICD-10-CM

## 2022-01-27 PROCEDURE — 77080 DXA BONE DENSITY AXIAL: CPT

## 2022-04-07 DIAGNOSIS — I10 ESSENTIAL HYPERTENSION: ICD-10-CM

## 2022-04-07 RX ORDER — METOPROLOL SUCCINATE 100 MG/1
TABLET, EXTENDED RELEASE ORAL
Qty: 90 TABLET | Refills: 1 | Status: SHIPPED | OUTPATIENT
Start: 2022-04-07 | End: 2022-10-06

## 2022-04-07 RX ORDER — LOSARTAN POTASSIUM AND HYDROCHLOROTHIAZIDE 12.5; 1 MG/1; MG/1
TABLET ORAL
Qty: 90 TABLET | Refills: 1 | Status: SHIPPED | OUTPATIENT
Start: 2022-04-07 | End: 2022-10-06

## 2022-06-29 RX ORDER — ALENDRONATE SODIUM 70 MG/1
TABLET ORAL
Qty: 12 TABLET | Refills: 1 | Status: SHIPPED | OUTPATIENT
Start: 2022-06-29

## 2022-07-12 ENCOUNTER — OFFICE VISIT (OUTPATIENT)
Dept: INTERNAL MEDICINE CLINIC | Age: 82
End: 2022-07-12
Payer: MEDICARE

## 2022-07-12 VITALS
SYSTOLIC BLOOD PRESSURE: 132 MMHG | WEIGHT: 164.2 LBS | HEIGHT: 64 IN | BODY MASS INDEX: 28.03 KG/M2 | DIASTOLIC BLOOD PRESSURE: 78 MMHG | OXYGEN SATURATION: 98 % | HEART RATE: 60 BPM

## 2022-07-12 DIAGNOSIS — Z00.00 MEDICARE ANNUAL WELLNESS VISIT, SUBSEQUENT: Primary | ICD-10-CM

## 2022-07-12 DIAGNOSIS — R13.12 OROPHARYNGEAL DYSPHAGIA: ICD-10-CM

## 2022-07-12 DIAGNOSIS — R26.81 UNSTEADY GAIT: ICD-10-CM

## 2022-07-12 DIAGNOSIS — I10 ESSENTIAL HYPERTENSION: ICD-10-CM

## 2022-07-12 PROCEDURE — G8417 CALC BMI ABV UP PARAM F/U: HCPCS | Performed by: INTERNAL MEDICINE

## 2022-07-12 PROCEDURE — 1036F TOBACCO NON-USER: CPT | Performed by: INTERNAL MEDICINE

## 2022-07-12 PROCEDURE — G0439 PPPS, SUBSEQ VISIT: HCPCS | Performed by: INTERNAL MEDICINE

## 2022-07-12 PROCEDURE — 1123F ACP DISCUSS/DSCN MKR DOCD: CPT | Performed by: INTERNAL MEDICINE

## 2022-07-12 PROCEDURE — 99213 OFFICE O/P EST LOW 20 MIN: CPT | Performed by: INTERNAL MEDICINE

## 2022-07-12 PROCEDURE — G8399 PT W/DXA RESULTS DOCUMENT: HCPCS | Performed by: INTERNAL MEDICINE

## 2022-07-12 PROCEDURE — G8427 DOCREV CUR MEDS BY ELIG CLIN: HCPCS | Performed by: INTERNAL MEDICINE

## 2022-07-12 PROCEDURE — 1090F PRES/ABSN URINE INCON ASSESS: CPT | Performed by: INTERNAL MEDICINE

## 2022-07-12 SDOH — ECONOMIC STABILITY: FOOD INSECURITY: WITHIN THE PAST 12 MONTHS, THE FOOD YOU BOUGHT JUST DIDN'T LAST AND YOU DIDN'T HAVE MONEY TO GET MORE.: NEVER TRUE

## 2022-07-12 SDOH — ECONOMIC STABILITY: FOOD INSECURITY: WITHIN THE PAST 12 MONTHS, YOU WORRIED THAT YOUR FOOD WOULD RUN OUT BEFORE YOU GOT MONEY TO BUY MORE.: NEVER TRUE

## 2022-07-12 ASSESSMENT — PATIENT HEALTH QUESTIONNAIRE - PHQ9
2. FEELING DOWN, DEPRESSED OR HOPELESS: 0
SUM OF ALL RESPONSES TO PHQ QUESTIONS 1-9: 0
SUM OF ALL RESPONSES TO PHQ QUESTIONS 1-9: 0
1. LITTLE INTEREST OR PLEASURE IN DOING THINGS: 0
SUM OF ALL RESPONSES TO PHQ QUESTIONS 1-9: 0
SUM OF ALL RESPONSES TO PHQ9 QUESTIONS 1 & 2: 0
SUM OF ALL RESPONSES TO PHQ QUESTIONS 1-9: 0

## 2022-07-12 ASSESSMENT — LIFESTYLE VARIABLES
HOW OFTEN DO YOU HAVE A DRINK CONTAINING ALCOHOL: MONTHLY OR LESS
HOW MANY STANDARD DRINKS CONTAINING ALCOHOL DO YOU HAVE ON A TYPICAL DAY: 1 OR 2

## 2022-07-12 ASSESSMENT — SOCIAL DETERMINANTS OF HEALTH (SDOH): HOW HARD IS IT FOR YOU TO PAY FOR THE VERY BASICS LIKE FOOD, HOUSING, MEDICAL CARE, AND HEATING?: NOT HARD AT ALL

## 2022-07-12 NOTE — PATIENT INSTRUCTIONS
Personalized Preventive Plan for Bunny Vigil - 7/12/2022  Medicare offers a range of preventive health benefits. Some of the tests and screenings are paid in full while other may be subject to a deductible, co-insurance, and/or copay. Some of these benefits include a comprehensive review of your medical history including lifestyle, illnesses that may run in your family, and various assessments and screenings as appropriate. After reviewing your medical record and screening and assessments performed today your provider may have ordered immunizations, labs, imaging, and/or referrals for you. A list of these orders (if applicable) as well as your Preventive Care list are included within your After Visit Summary for your review. Other Preventive Recommendations:    · A preventive eye exam performed by an eye specialist is recommended every 1-2 years to screen for glaucoma; cataracts, macular degeneration, and other eye disorders. · A preventive dental visit is recommended every 6 months. · Try to get at least 150 minutes of exercise per week or 10,000 steps per day on a pedometer . · Order or download the FREE \"Exercise & Physical Activity: Your Everyday Guide\" from [employer]. Call 9-566.188.6682 or search [employer] online. · You need 0227-4195 mg of calcium and 3389-0972 IU of vitamin D per day. It is possible to meet your calcium requirement with diet alone, but a vitamin D supplement is usually necessary to meet this goal.  · When exposed to the sun, use a sunscreen that protects against both UVA and UVB radiation with an SPF of 30 or greater. Reapply every 2 to 3 hours or after sweating, drying off with a towel, or swimming. · Always wear a seat belt when traveling in a car. Always wear a helmet when riding a bicycle or motorcycle.

## 2022-07-12 NOTE — PROGRESS NOTES
Medicare Annual Wellness Visit  Amanda Hansen  7/12/2022    ASSESSMENT/PLAN   Medicare annual wellness visit, subsequent  Essential hypertension  Assessment & Plan:  Well-controlled. Continue current antihypertensive regimen. Orders:  -     Comprehensive Metabolic Panel, Fasting; Future  Unsteady gait  Assessment & Plan:  No falls, but patient concerned about unsteadiness, so referred to PT for strength and balance training. Orders:  -     [facility] [place]  Oropharyngeal dysphagia  Assessment & Plan:  Intermittent choking episodes over the past 3 years, with hoarseness. No episodes of food sticking. Has PND, sneezing and chronic, dry cough- attributed to allergies. Mild reflux symptoms. ENT consultation recommended- patient will consider. Patient will call if symptoms change or worsen. Recommendations for Preventive Services Due: see orders and patient instructions/AVS.  Recommended screening schedule for the next 5-10 years is provided to the patient in written form: see Patient Instructions/AVS.    Return in about 6 months (around 1/12/2023) for 30 MIN F/U.    SUBJECTIVE   Subjective Amanda Hansen is here for Medicare AWV (thyroidism , horse voice )        Patient's complete Health Risk Assessment and screening values have been reviewed and are found in [facility] [place]. The following risks were reviewed today and where indicated follow up appointments were made and/or referrals ordered.     Positive Risk Factor Screenings with Interventions:    Fall Risk:  Do you feel unsteady or are you worried about falling? : (!) yes  2 or more falls in past year?: no  Fall with injury in past year?: no     Fall Risk Interventions:    · Physical therapy referral ordered for strength and balance training              General Health and ACP:  General  In general, how would you say your health is?: Fair  In the past 7 days, have you experienced any of the following: New or Increased Pain, New or Increased Fatigue, Loneliness, Social Isolation, Stress or Anger?: (!) Yes  Select all that apply: (!) Anger  Do you get the social and emotional support that you need?: Yes  Do you have a Living Will?: Yes    Advance Directives     Power of Juanjo Lam Will ACP-Advance Directive ACP-Power of     Not on File Not on File Not on File Not on File      General Health Risk Interventions:  · Anger: Intermittent marital discord- declines counseling    Health Habits/Nutrition:     Physical Activity: Inactive    Days of Exercise per Week: 0 days    Minutes of Exercise per Session: 0 min     Have you lost any weight without trying in the past 3 months?: No  Body mass index: (!) 28.63  Have you seen the dentist within the past year?: Yes    Health Habits/Nutrition Interventions:  · Inadequate physical activity:  Will work with PT on developing a home exercise plan    Hearing/Vision:  Do you or your family notice any trouble with your hearing that hasn't been managed with hearing aids?: (!) Yes  Do you have difficulty driving, watching TV, or doing any of your daily activities because of your eyesight?: No  Have you had an eye exam within the past year?: Yes  No exam data present    Hearing/Vision Interventions:  · Hearing concerns:  patient declines any further evaluation/treatment for hearing issues        OBJECTIVE     Wt Readings from Last 3 Encounters:   07/12/22 164 lb 3.2 oz (74.5 kg)   01/11/22 168 lb (76.2 kg)   01/06/22 167 lb (75.8 kg)     BP Readings from Last 3 Encounters:   07/12/22 132/78   01/11/22 (!) 155/72   01/06/22 132/84     Body mass index is 28.63 kg/m². Physical Exam  Constitutional:       General: She is not in acute distress. Appearance: She is well-developed.    HENT:      Right Ear: Tympanic membrane, ear canal and external ear normal.      Left Ear: Tympanic membrane, ear canal and external ear normal.      Mouth/Throat:      Pharynx: No oropharyngeal exudate or posterior oropharyngeal erythema. Eyes:      Conjunctiva/sclera: Conjunctivae normal.   Cardiovascular:      Rate and Rhythm: Normal rate and regular rhythm. Heart sounds: Murmur heard. Systolic (RUSB) murmur is present with a grade of 2/6. No friction rub. No gallop. Pulmonary:      Effort: Pulmonary effort is normal. No respiratory distress. Breath sounds: Normal breath sounds. No wheezing, rhonchi or rales. Musculoskeletal:      Right lower leg: No edema. Left lower leg: No edema. Lymphadenopathy:      Cervical: No cervical adenopathy. Skin:     General: Skin is warm and dry. Neurological:      Mental Status: She is alert and oriented to person, place, and time. Gait: Gait normal.   Psychiatric:         Speech: Speech normal.         Behavior: Behavior normal.         Thought Content: Thought content normal.         Judgment: Judgment normal.         Allergies   Allergen Reactions    Amoxicillin-Pot Clavulanate Diarrhea     Prior to Visit Medications    Medication Sig Taking?  Authorizing Provider   alendronate (FOSAMAX) 70 MG tablet TAKE 1 TABLET EVERY 7 DAYS WITH WATER 30 MINUTES      BEFORE FIRST MEAL REMAIN   UPRIGHT FOR 30 MINUTES Yes Vane Hunt MD   losartan-hydroCHLOROthiazide (HYZAAR) 100-12.5 MG per tablet TAKE 1 TABLET DAILY Yes Vane Hunt MD   metoprolol succinate (TOPROL XL) 100 MG extended release tablet TAKE 1 TABLET DAILY Yes Vane Hunt MD   vitamin B-12 (CYANOCOBALAMIN) 100 MCG tablet Take 100 mcg by mouth daily  Yes Historical Provider, MD   cetirizine (ZYRTEC ALLERGY) 10 MG tablet Take 1 tablet by mouth daily Yes Vane Hunt MD   triamcinolone (NASACORT AQ) 55 MCG/ACT nasal inhaler 2 sprays by Nasal route daily Yes Vane Hunt MD   Cholecalciferol (VITAMIN D) 1000 UNIT TABS Take 1,000 Units by mouth daily  Yes Historical Provider, MD Roy (Including outside providers/suppliers regularly involved in providing care):   Patient Care Team:  Padma Blackman MD as PCP - General  Padma Blackman MD as PCP - [facility] Empaneled Provider  Rodger Park MD as Consulting Physician (Orthopedic Surgery)  Prince Montes MD as Consulting Physician (Ophthalmology)  Iman Nassar MD as Consulting Physician (Dermatology)  Nisreen Ryan MD as Surgeon (General Surgery)    Current Health Maintenance Status  Health Maintenance   Topic Date Due    Depression Screen  06/29/2022    Flu vaccine (1) 09/01/2022    Annual Wellness Visit (AWV)  07/13/2023    DTaP/Tdap/Td vaccine (3 - Td or Tdap) 03/18/2032    Shingles vaccine  Completed    Pneumococcal 65+ years Vaccine  Completed    COVID-19 Vaccine  Completed    Hepatitis C screen  Completed    Hepatitis A vaccine  Aged Out    Hepatitis B vaccine  Aged Out    Hib vaccine  Aged Out    Meningococcal (ACWY) vaccine  Aged Out     Immunization History   Administered Date(s) Administered    COVID-19, MODERNA BLUE border, Primary or Immunocompromised, (age 12y+), IM, 100 mcg/0.5mL 01/29/2021, 02/26/2021, 10/28/2021    Influenza 10/28/2010, 10/20/2011    Influenza Virus Vaccine 11/14/2013, 11/30/2016    Influenza, High Dose (Fluzone 65 yrs and older) 11/16/2012, 12/02/2015, 11/20/2018, 10/13/2020    Pneumococcal Conjugate 13-valent ([id number]) 05/14/2015    Pneumococcal Polysaccharide ([id number]) 12/15/2009    Td, unspecified formulation 04/01/2001    Tdap (Boostrix, Adacel) 10/08/2007    Zoster Live (Zostavax) 10/08/2007    Zoster Recombinant (Shingrix) 05/01/2019, 07/11/2019            Reviewed and updated this visit:  Tobacco  Allergies  Meds  Problems  Med Hx  Surg Hx  Soc Hx  Fam Hx

## 2022-07-12 NOTE — ASSESSMENT & PLAN NOTE
No falls, but patient concerned about unsteadiness, so referred to PT for strength and balance training.

## 2022-07-12 NOTE — ASSESSMENT & PLAN NOTE
Intermittent choking episodes over the past 3 years, with hoarseness. No episodes of food sticking. Has PND, sneezing and chronic, dry cough- attributed to allergies. Mild reflux symptoms. ENT consultation recommended- patient will consider. Patient will call if symptoms change or worsen.

## 2022-07-25 ENCOUNTER — HOSPITAL ENCOUNTER (OUTPATIENT)
Dept: PHYSICAL THERAPY | Age: 82
Setting detail: THERAPIES SERIES
Discharge: HOME | End: 2022-07-25
Payer: MEDICARE

## 2022-07-25 PROCEDURE — 97162 PT EVAL MOD COMPLEX 30 MIN: CPT

## 2022-07-25 PROCEDURE — 97530 THERAPEUTIC ACTIVITIES: CPT

## 2022-07-25 NOTE — FLOWSHEET NOTE
[facility]  [address] [place] [address], [address] [place] [address]  Phone: [phone]   Fax: [phone]    Physical Therapy Treatment Note/ Progress Report:     Date:  2022    Patient Name:  Dwight Kumar    :  1940  MRN: [id number]    Medical/Treatment Diagnosis Information:  Diagnosis: Unsteady gait (R26.81)  Treatment Diagnosis: generalized muscle weakness M62.81, gait abnormality Q06.7  Insurance/Certification information:  PT Insurance Information: Medicare, Medical [place]  Physician Information:   Gokul Merchant MD  Plan of care signed:    [] Yes  [x] No    Date of Patient follow up with Physician: ?     Progress Report: []  Yes  [x]  No     Date Range for reporting period:  Beginnin2022  Ending:  NA    Progress report due (10 Rx/or 30 days whichever is less):      Recertification due (POC duration/ or 90 days whichever is less):      Visit # Insurance Allowable Auth Needed    BMN []Yes   [x]No     RESTRICTIONS/PRECAUTIONS: HTN, osteopenia, OA, shortness of breath, neuropathy, meningioma excision T-spine  Latex Allergy:  [x]NO      []YES  Preferred Language for Healthcare:   [x]English       []other:  Functional Scale: FOTO balance confidence Date assessed:2022    Pain level:  0/10     SUBJECTIVE:  See eval    OBJECTIVE: See eval  Observation:   Test measurements:      Exercises/Interventions: Exercises in bold performed in department today. Items not bolded are carried forward from prior visits for continuity of the record. Exercise/Equipment Resistance/Repetitions Other comments   Nustep     Standing hip ext     Standing hip abd     Standing marches                                        Balance                                     Home Exercise Program:Pt. demonstrated good understanding and knowledge of HEP. Written instructions provided.     2022: none initiated today    Therapeutic Exercise:   [] (89285) Provided verbal/tactile cueing for activities related to strengthening, flexibility, endurance, ROM for improvements in LE, proximal hip, and core control with self-care, mobility, lifting, ambulation. NMR:  [] (36184) Provided verbal/tactile cueing for activities related to improving balance, coordination, kinesthetic sense, posture, motor skill, proprioception to assist with LE, proximal hip, and core control in self-care, mobility, lifting, ambulation and eccentric single leg control. Therapeutic Activities:    [x] (39627) Provided verbal/tactile cueing for activities related to improving balance, coordination, kinesthetic sense, posture, motor skill, proprioception and motor activation to allow for proper function of core, proximal hip and LE with self-care and ADLs and functional mobility. Educated pt in deficits noted during today's evaluation and plans for treatment, pt verbalized understanding. Gait Training:    [] (36998) Provided training and instruction to the patient for proper LE, core and proximal hip recruitment and positioning and eccentric body weight control with ambulation re-education including up and down stairs     Manual Treatments:  PROM / STM / Oscillations-Mobs:  G-I, II, III, IV (PA's, Inf., Post.)  [] (84793) Provided manual therapy to mobilize LE, proximal hip and/or LS spine soft tissue/joints for the purpose of modulating pain, promoting relaxation,  increasing ROM, reducing/eliminating soft tissue swelling/inflammation/restriction, improving soft tissue extensibility and allowing for proper ROM for normal function with self-care, mobility, lifting and ambulation.      Home Exercise Program:    [] (24110) Reviewed/Progressed HEP activities related to strengthening, flexibility, endurance, ROM of core, proximal hip and LE for functional self-care, mobility, lifting and ambulation/stair navigation   [] (63851) Reviewed/Progressed HEP activities related to improving balance, coordination, kinesthetic sense, posture, motor skill, proprioception of core, proximal hip and LE for self-care, mobility, lifting, and ambulation/stair navigation      Modalities:    [] Electric Stimulation:   [] Ultrasound:   [] Other:       Charges:  Timed Code Treatment Minutes: TA: 8   Total Treatment Minutes: 30      [] EVAL (LOW) 51738 (typically 20 minutes face-to-face)  [x] EVAL (MOD) 30698 (typically 30 minutes face-to-face)  [] EVAL (HIGH) 88274 (typically 45 minutes face-to-face)  [] RE-EVAL     [] AX(52632) x       [] NMR (01080) x       [] Manual (80867) x       [x] TA (81753) x  1     [] Gait Training (30341) x       [] ES(attended) (45451)  [] ES (un) (22 214454)   [] DRY NEEDLE 1 OR 2 MUSCLES  [] DRY NEEDLE 3+ MUSCLES  [] Mech Traction (72209)  [] Ultrasound (87061)  [] Other:    IGOALS:   Patient stated goal: \"I would just like for it to improve, get the steadiness back when I walk. \"  [] Progressing: [] Met: [] Not Met: [] Adjusted     Therapist goals for Patient:  Short Term Goals: To be achieved in: 3 weeks  1. Independent in initial HEP per patient tolerance, in order to prevent re-injury. [] Progressing: [] Met: [] Not Met: [] Adjusted  2. Patient will improve SLS to 3 seconds B for improved balance  [] Progressing: [] Met: [] Not Met: [] Adjusted     Long Term Goals: To be achieved in: 6 weeks  1. Disability index score of 51 or better on FOTO Balance confidence to assist with reaching prior level of function. [] Progressing: [] Met: [] Not Met: [] Adjusted  2. Patient will demonstrate increased SLS to 6 seconds B for rare feelings of unsteadiness with ambulating over uneven terrain   [] Progressing: [] Met: [] Not Met: [] Adjusted  3. Patient will demonstrate an increase in Strength B LE 4+/5 to allow for prolonged standing  [] Progressing: [] Met: [] Not Met: [] Adjusted  4.  Pt will be able to ambulate with trace Trendelenburg gait pattern and rare sensations of unsteadiness during ambulation, especially when initiating ambulation. [] Progressing: [] Met: [] Not Met: [] Adjusted  5. Independent in HEP progressions per patient tolerance, in order to prevent re-injury. [] Progressing: [] Met: [] Not Met: [] Adjusted    ASSESSMENT:  See eval      Treatment/Activity Tolerance:  [x] Patient tolerated treatment well [] Patient limited by fatique  [] Patient limited by pain  [] Patient limited by other medical complications  [] Other:     Overall Progression Towards Functional goals/ Treatment Progress Update:  [] Patient is progressing as expected towards functional goals listed. [] Progression is slowed due to complexities/Impairments listed. [] Progression has been slowed due to co-morbidities. [x] Plan just implemented, too soon to assess goals progression <30days   [] Goals require adjustment due to lack of progress  [] Patient is not progressing as expected and requires additional follow up with physician  [] Other    Prognosis for POC: [x] Good [] Fair  [] Poor    Patient requires continued skilled intervention: [x] Yes  [] No        PLAN: See eval  [] Continue per plan of care [] Alter current plan (see comments)  [x] Plan of care initiated [] Hold pending MD visit [] Discharge    Electronically signed by: Bambi Philippe PT, DPT [id number]    Note: If patient does not return for scheduled/recommended follow up visits, this note will serve as a discharge from care along with the most recent update on progress.

## 2022-07-25 NOTE — PLAN OF CARE
[facility] Outpatient Therapy  [address] [place] [address], [address], [address]  Phone: [phone]   Fax: [phone]                                            Physical Therapy Certification    Dear Carol Mcdaniel MD  ,    We had the pleasure of evaluating the following patient for physical therapy services at [facility] ([facility]). A summary of our findings can be found in the initial assessment below. This includes our plan of care. If you have any questions or concerns regarding these findings, please do not hesitate to contact me at the office phone number checked above. Thank you for the referral.       Physician Signature:_______________________________Date:__________________  By signing above (or electronic signature), therapist's plan is approved by physician      Patient: Ray Gómez   : 1940   MRN: [id number]  Referring Physician:  Carol Mcdaniel MD      Evaluation Date: 2022      Medical Diagnosis Information:  Diagnosis: Unsteady gait (R26.81)   Treatment Diagnosis: generalized muscle weakness M62.81, gait abnormality R26.9                                         Insurance information: PT Insurance Information: Medicare, Medical Hugo     Precautions/ Contra-indications: HTN, osteopenia, OA, shortness of breath, neuropathy, meningioma excision T-spine  Latex Allergy:  [x]NO      []YES   Preferred Language for Healthcare:   [x]English       []other:  C-SSRS Triggered by Intake questionnaire (Past 2 wk assessment):   [x] No, Questionnaire did not trigger screening.   [] Yes, Patient intake triggered further evaluation      [] C-SSRS Screening completed  [] PCP notified via Plan of Care  [] Emergency services notified    SUBJECTIVE:  History of Present Illness:      Pt presents with c/o feeling unsteady walking slowly in crowds. Harder to initiate ambulation with stop/go vs continued walking that has gotten worse with time.   Started noticing this about 2 years ago - feels has been less active vs before the pandemic. Pain       Patient reports pain is 0/10 pain at present and 3/10 pain at its worst L knee sometimes. Pain increases with: down steps      Decreases with: not going down steps   Pain description:  sharp  Paresthesias: B feet N/T that comes and goes  Pt. reports pain with coughing, sneezing and laughing:   []Yes   []No    [x]NA    Imaging: NA     Current Functional Limitations:   [x]Yes   []No  Functional Complaints:  walking, standing, uneven ground, with ramps/inclines/steps needs railing  PLOF:   [x]No functional limitations   []Pre-exisiting limitations:  Pt's sleep is affected?    []Yes   [x]No         Social support/Environment:    Family/caregiver support:   [x]Yes   []No    Home Environment:   []1 story   []>2 story   []LORETO   []No rail   []R handrail    []L handrail    Bathroom Environment:   []Walk in shower   []Tub   []Tub/shower combo     []Grab bars   []Shower seat   []Modified toilet   []Hand held shower head    Equipment:    []Rolling walker   []Standard walker   []Rollator   []David-walker  []Wheelchair   []Quad cane   []Straight cane  []Bedside commode  []Hospital bed  Other:      Relevant Medical History: HTN, osteopenia, OA, shortness of breath, neuropathy, meningioma excision T-spine    Co-morbidities/Complexities (which will affect course of rehabilitation):   []None           Arthritic conditions   []Rheumatoid arthritis (M05.9)  [x]Osteoarthritis (M19.91)   Cardiovascular conditions   [x]Hypertension (I10)  []Hyperlipidemia (E78.5)  []Angina pectoris (I20)  []Atherosclerosis (I70)   Musculoskeletal conditions   []Disc pathology   []Congenital spine pathologies   []Prior surgical intervention  []Osteoporosis (M81.8)  [x]Osteopenia (M85.8)   Endocrine conditions   []Hypothyroid (E03.9)  []Hyperthyroid Gastrointestinal conditions   []Constipation (B16.45)   Metabolic conditions   []Morbid obesity (E66.01)  []Diabetes type 1(E10.65) or 2 (E11.65)   [x]Neuropathy (G60.9)     Pulmonary conditions   []Asthma (J45)  []Coughing   []COPD (J44.9)   Psychological Disorders  []Anxiety (F41.9)  []Depression (F32.9)   []Other:   [x]Other:  shortness of breath, meningioma excision T-spine          Occupation/School: retired    Sports/Hobbies/Recreational Activities: friends of the [employer]    OBJECTIVE:     Functional Scale/Score: FOTO balance confidence = 42 (on a scale of 23-77 with higher number = greater function)    Gait/Steps     []Gait WNL unless otherwise noted below:                             [x]Deviations on a level linoleum surface include:  pt ambulates with Trendelenburg gait pattern, noted hesitation in initiating gait after a time seated    SLS - unable    []Steps WNL (reciprocal pattern with 0-1 rail) unless otherwise noted below:    []Deviations include:     Range of Motion/Strength Testing-Myotomes    [x]All ROM NT except as marked below   [x]All strength NT except as marked below    [x]All myotomes NT except as marked below      Range Tested MMT/ Resisted PROM AROM Comments   *denotes pain Left Right Left Right Left Right    Hip Flexion  (L1-2) 3+/5 3+/5        Hip Extension 3-/5 3/5        Hip Abduction  (L5) 4-/5 4-/5     In sitting   Hip Adduction  (L3)          Hip IR          Hip ER          Knee Flexion  (L5,S1) 4-/5 5/5        Knee Extension  (L3,4) 4-/5 5/5        Ankle Dorsiflex  (L4) 4/5 4/5        Ankle Plantarflex  (S1,2)          Ankle Inversion 4+/5 4/5        Ankle Eversion 4-/5 3+/5        Great Toe Ext  (L5)            Dermatomal Sensation [x]All NT except as marked below   []All dermatomes WNL for light touch except as marked below   Abnormal Dermatome Findings Left Right   Anterior groin, 2-3 inches below ASIS (L1-L2)     Middle third anterior thigh (L3)     Patella and med malleolus (L4)     Fibular head and dorsum of foot (L5)     Lateral side and plantar surface of foot (S1)     Medial aspect of posterior thigh (S2) Flexibility   [x]All NT except as marked below  Muscle Abnormal Findings   Hip flexors/Landon  []WNL   []Decreased R   []Decreased L      Hamstrings  Degrees in 90/90 []WNL   []Decreased R   []Decreased L     Right:              Left:      Gastrocs []WNL   []Decreased R   []Decreased L      Obers/TFL/ITB []WNL   []Decreased R   []Decreased L      Piriformis  []WNL   []Decreased R   []Decreased L      Other:  []WNL   []Decreased R   []Decreased L        Special Tests- knee and ankle  [x]All NT except as marked below  Special Test Abnormal Findings   Anterior Drawer test []Neg   []Pos R   []Pos L      Posterior Drawer test []Neg   []Pos R   []Pos L      Lachman's test []Neg   []Pos R   []Pos L      Varus stress  []Neg   []Pos R   []Pos L      Valgus stress  []Neg   []Pos R   []Pos L      Daniel's test   []Neg   []Pos R   []Pos L      Apley's Compression []Neg   []Pos R   []Pos L      Apley's Distraction  []Neg   []Pos R   []Pos L      VMO tone []WNL []Dec R   []Dec L      Mentasta Ankle Rules []Neg   []Pos R   []Pos L        Palpation   NT  Patient reported tenderness with palpation:  []Yes   []No   []NA  Location:    PT notes warmth:  []Yes   []No   []NA  Location:   PT notes increased muscle tone:   []Yes   []No   []NA  Location:   PT notes crepitus with palpation:   []Yes   []No   []NA   Location:   Ligament tenderness/provocation:   []Yes   []No   []NA  Location:   PT notes decreased scar mobility:   []Yes   []No   []NA  Location:      Appearance  NT  PT notes swelling:   []Yes   []No   []NA  Location:     PT notes redness:  []Yes   []No   []NA  Location:   PT notes drainage:   []Yes   []No   []NA  Location:      Girth Measurements (cm)   [x]All NT except as marked below     Location Left Right Location Left Right   6 superior to superior patellar pole   3\" inferior to inferior patellar pole     3 superior to superior patellar pole    6\" inferior to inferior patellar pole     Superior patellar pole Malleoli     Inferior patellar pole   Figure 8        Met heads        Specific Joint Mobility Testing/Accessory Motions    [x]NT  Lumbar:  Hip:  Knee/patella: Ankle:    Deep Tendon Reflexes  [x]NT  Quadriceps (L3,4)     []Pendular x 3 R  []Pendular x 3 L   Achilles (S1,2)      Ankle clonus , # of beats      Babinski's reflex           Bandages/Dressings/Incisions: NA                         [x] Patient history, allergies, meds reviewed. Medical chart reviewed. See intake form. Review Of Systems (ROS):  [x]Performed Review of systems (Integumentary, CardioPulmonary, Neurological) by intake and observation. Intake form has been scanned into medical record. Patient has been instructed to contact their primary care physician regarding ROS issues if not already being addressed at this time. Barriers to/and or personal factors that will affect rehab potential:              [x]Age  []Sex    []Smoker              []Motivation/Lack of Motivation                        [x]Co-Morbidities              []Cognitive Function, education/learning barriers              []Environmental, home barriers              []profession/work barriers  []past PT/medical experience  []other:  Justification:     Falls Risk Assessment (30 days):   [x] Falls Risk assessed and no intervention required. [] Falls Risk assessed and Patient requires intervention due to being higher risk   TUG score (>12s at risk):     [] Falls education provided, including:         ASSESSMENT: Pt is  80 Y. O  female, presenting with c/o unsteadiness with walking. Assessment reveals deficits in strength, balance as well as increased pain. Pt will benefit from cont PT to address these deficits and promote return to highest level of functional independence.       Functional Impairments:     []Noted lumbar/proximal hip/LE hypomobility   []Decreased LE functional ROM   [x]Decreased core/proximal hip strength and neuromuscular control   [x]Decreased LE functional strength   [x]Reduced balance/proprioceptive control   []other:      Functional Activity Limitations (from functional questionnaire and intake)   [x]Reduced ability to tolerate prolonged functional positions   []Reduced ability or difficulty with changes of positions or transfers between positions   []Reduced ability to maintain good posture and demonstrate good body mechanics with sitting, bending, and lifting   []Reduced ability to sleep   []Reduced ability or tolerance with driving and/or computer work   []Reduced ability to perform lifting, carrying tasks   []Reduced ability to squat   []Reduced ability to forward bend   [x]Reduced ability to ambulate prolonged functional periods/distances/surfaces   [x]Reduced ability to ascend/descend stairs   []Reduced ability to run, hop or jump   []other:     Participation Restrictions   []Reduced participation in self-care activities   [x]Reduced participation in home management activities   []Reduced participation in work activities   [x]Reduced participation in social activities   []Reduced participation in sport activities    Classification :    []Signs/symptoms consistent with post-surgical status including decreased ROM, strength and function.    []Signs/symptoms consistent with joint sprain/strain   []Signs/symptoms consistent with patella-femoral syndrome   [x]Signs/symptoms consistent with generalized B LE muscle weakness, balance deficits, gait abnormality   []Signs/symptoms consistent with internal derangement of knee/Hip   []Signs/symptoms consistent with functional hip weakness/NMR control      []Signs/symptoms consistent with tendinitis/tendinosis    []signs/symptoms consistent with pathology which may benefit from Dry needling      Prognosis/Rehab Potential:      []Excellent   [x]Good    []Fair   []Poor    Tolerance of evaluation/treatment:    []Excellent   [x]Good    []Fair   []Poor     Physical Therapy Evaluation Complexity Justification  [x] A history of present problem with:  [] no personal factors and/or comorbidities that impact the plan of care;  []1-2 personal factors and/or comorbidities that impact the plan of care  [x]3 personal factors and/or comorbidities that impact the plan of care  [x] An examination of body systems using standardized tests and measures addressing any of the following: body structures and functions (impairments), activity limitations, and/or participation restrictions;:  [] a total of 1-2 or more elements   [x] a total of 3 or more elements   [] a total of 4 or more elements   [x] A clinical presentation with:  [] stable and/or uncomplicated characteristics   [x] evolving clinical presentation with changing characteristics  [] unstable and unpredictable characteristics;   [x] Clinical decision making of [] low, [x] moderate, [] high complexity using standardized patient assessment instrument and/or measurable assessment of functional outcome. [] EVAL (LOW) 05874 (typically 20 minutes face-to-face)  [x] EVAL (MOD) 73921 (typically 30 minutes face-to-face)  [] EVAL (HIGH) 53876 (typically 45 minutes face-to-face)  [] RE-EVAL    PLAN:  Frequency/Duration:  2 days per week for 6 Weeks:  Interventions:  [x]  Therapeutic exercise including: strength training, ROM, for Lower extremity and core   [x]  NMR activation and proprioception for LE, Glutes and Core. [x]  Manual therapy as indicated for LE, Hip and spine to include: Dry Needling/IASTM, STM, PROM, Gr I-IV mobilizations, manipulation. [x] Therapeutic activities for LE and core. [x] Gait Training including gait normalization and reducing fall risk.   [x] Modalities as needed that may include: thermal agents, E-stim, Biofeedback, US, iontophoresis as indicated  [x] Patient education on joint protection, postural re-education, activity modification, progression of HEP    HEP instruction: none initiated today    GOALS:  Patient stated goal: \"I would just like for it to improve, get the steadiness back when I walk. \"  [] Progressing: [] Met: [] Not Met: [] Adjusted    Therapist goals for Patient:   Short Term Goals: To be achieved in: 3 weeks  1. Independent in initial HEP per patient tolerance, in order to prevent re-injury. [] Progressing: [] Met: [] Not Met: [] Adjusted  2. Patient will improve SLS to 3 seconds B for improved balance  [] Progressing: [] Met: [] Not Met: [] Adjusted    Long Term Goals: To be achieved in: 6 weeks  1. Disability index score of 51 or better on FOTO Balance confidence to assist with reaching prior level of function. [] Progressing: [] Met: [] Not Met: [] Adjusted  2. Patient will demonstrate increased SLS to 6 seconds B for rare feelings of unsteadiness with ambulating over uneven terrain   [] Progressing: [] Met: [] Not Met: [] Adjusted  3. Patient will demonstrate an increase in Strength B LE 4+/5 to allow for prolonged standing  [] Progressing: [] Met: [] Not Met: [] Adjusted  4. Pt will be able to ambulate with trace Trendelenburg gait pattern and rare sensations of unsteadiness during ambulation, especially when initiating ambulation. [] Progressing: [] Met: [] Not Met: [] Adjusted  5. Independent in HEP progressions per patient tolerance, in order to prevent re-injury. [] Progressing: [] Met: [] Not Met: [] Adjusted      Electronically signed by:   Ephraim Dorsey, [address] [facility], DPT [id number]

## 2022-07-27 ENCOUNTER — HOSPITAL ENCOUNTER (OUTPATIENT)
Dept: PHYSICAL THERAPY | Age: 82
Setting detail: THERAPIES SERIES
Discharge: HOME | End: 2022-07-27
Payer: MEDICARE

## 2022-07-27 PROCEDURE — 97110 THERAPEUTIC EXERCISES: CPT

## 2022-07-27 PROCEDURE — 97112 NEUROMUSCULAR REEDUCATION: CPT

## 2022-07-27 NOTE — FLOWSHEET NOTE
[facility] Outpatient Therapy  [address], [address], [address]  Phone: [phone]   Fax: [phone]    Physical Therapy Treatment Note/ Progress Report:     Date:  2022    Patient Name:  Harleen Santillan    :  1940  MRN: [id number]    Medical/Treatment Diagnosis Information:  Diagnosis: Unsteady gait (R26.81)  Treatment Diagnosis: generalized muscle weakness M62.81, gait abnormality U78.0  Insurance/Certification information:  PT Insurance Information: Medicare, Medical Hines  Physician Information:   Marychuy Lei MD  Plan of care signed:    [x] Yes  [] No    Date of Patient follow up with Physician: ?     Progress Report: []  Yes  [x]  No     Date Range for reporting period:  Beginnin2022  Ending:  NA    Progress report due (10 Rx/or 30 days whichever is less):      Recertification due (POC duration/ or 90 days whichever is less):      Visit # Insurance Allowable Auth Needed    BMN []Yes   [x]No     RESTRICTIONS/PRECAUTIONS: HTN, osteopenia, OA, shortness of breath, neuropathy, meningioma excision T-spine  Latex Allergy:  [x]NO      []YES  Preferred Language for Healthcare:   [x]English       []other:  Functional Scale: FOTO balance confidence Date assessed:2022    Pain level:  0/10     SUBJECTIVE:  Pt notes no complaints after IE.    OBJECTIVE:     Exercises/Interventions: Exercises in bold performed in department today. Items not bolded are carried forward from prior visits for continuity of the record.     Exercise/Equipment Resistance/Repetitions Other comments   Nustep, seat 7, no arms, Lv 3 5 mins    Standing hip ext X 10 each B fatigue   Standing hip abd X 10 each B fatigue   Standing marches X 10 each B fatigue                                      Balance Feet together on firm, 3 x 15\", EO    Modified tandem, 3 x 10\" each way    Feet about 3-4 inches apart on firm, EC, 3 x 15\"    SLS, B 2 finger A L more challenging                                   Home Exercise Program:Pt. demonstrated good understanding and knowledge of HEP. Written instructions provided. 07/25/2022: none initiated today  07/27/2022: standing hip abd, ext, marching 1x per day, 1-2 sets of 10 reps    Therapeutic Exercise:   [x] (71028) Provided verbal/tactile cueing for activities related to strengthening, flexibility, endurance, ROM for improvements in LE, proximal hip, and core control with self-care, mobility, lifting, ambulation. NMR:  [x] (38621) Provided verbal/tactile cueing for activities related to improving balance, coordination, kinesthetic sense, posture, motor skill, proprioception to assist with LE, proximal hip, and core control in self-care, mobility, lifting, ambulation and eccentric single leg control. Therapeutic Activities:    [] (03492) Provided verbal/tactile cueing for activities related to improving balance, coordination, kinesthetic sense, posture, motor skill, proprioception and motor activation to allow for proper function of core, proximal hip and LE with self-care and ADLs and functional mobility. Gait Training:    [] (22888) Provided training and instruction to the patient for proper LE, core and proximal hip recruitment and positioning and eccentric body weight control with ambulation re-education including up and down stairs     Manual Treatments:  PROM / STM / Oscillations-Mobs:  G-I, II, III, IV (PA's, Inf., Post.)  [] (33215) Provided manual therapy to mobilize LE, proximal hip and/or LS spine soft tissue/joints for the purpose of modulating pain, promoting relaxation,  increasing ROM, reducing/eliminating soft tissue swelling/inflammation/restriction, improving soft tissue extensibility and allowing for proper ROM for normal function with self-care, mobility, lifting and ambulation.      Home Exercise Program:    [x] (73501) Reviewed/Progressed HEP activities related to increase in Strength B LE 4+/5 to allow for prolonged standing  [] Progressing: [] Met: [] Not Met: [] Adjusted  4. Pt will be able to ambulate with trace Trendelenburg gait pattern and rare sensations of unsteadiness during ambulation, especially when initiating ambulation. [] Progressing: [] Met: [] Not Met: [] Adjusted  5. Independent in HEP progressions per patient tolerance, in order to prevent re-injury. [] Progressing: [] Met: [] Not Met: [] Adjusted    ASSESSMENT:  Pt with good tolerance to exercises added today for strengthening and balance without pain reported. Pt will benefit from additional therapy to address deficits to return to PLOF. Treatment/Activity Tolerance:  [x] Patient tolerated treatment well [] Patient limited by fatique  [] Patient limited by pain  [] Patient limited by other medical complications  [] Other:     Overall Progression Towards Functional goals/ Treatment Progress Update:  [] Patient is progressing as expected towards functional goals listed. [] Progression is slowed due to complexities/Impairments listed. [] Progression has been slowed due to co-morbidities. [x] Plan just implemented, too soon to assess goals progression <30days   [] Goals require adjustment due to lack of progress  [] Patient is not progressing as expected and requires additional follow up with physician  [] Other    Prognosis for POC: [x] Good [] Fair  [] Poor    Patient requires continued skilled intervention: [x] Yes  [] No        PLAN:   [x] Continue per plan of care [] Alter current plan (see comments)  [] Plan of care initiated [] Hold pending MD visit [] Discharge    Electronically signed by: Geraldine Babcock PT, DPT [id number]    Note: If patient does not return for scheduled/recommended follow up visits, this note will serve as a discharge from care along with the most recent update on progress.

## 2022-08-02 ENCOUNTER — HOSPITAL ENCOUNTER (OUTPATIENT)
Dept: PHYSICAL THERAPY | Age: 82
Setting detail: THERAPIES SERIES
Discharge: HOME | End: 2022-08-02
Payer: MEDICARE

## 2022-08-02 PROCEDURE — 97110 THERAPEUTIC EXERCISES: CPT

## 2022-08-02 PROCEDURE — 97112 NEUROMUSCULAR REEDUCATION: CPT

## 2022-08-02 NOTE — FLOWSHEET NOTE
[facility]patient [facility]  [address] [employer], [address] [place] [address], [address] [place] [address]  Phone: [phone]   Fax: [phone]    Physical Therapy Treatment Note/ Progress Report:     Date:  2022    Patient Name:  Jian Du    :  1940  MRN: [id number]    Medical/Treatment Diagnosis Information:  Diagnosis: Unsteady gait (R26.81)  Treatment Diagnosis: generalized muscle weakness M62.81, gait abnormality D63.3  Insurance/Certification information:  PT Insurance Information: Medicare, Medical [place]  Physician Information:   Valerie Street MD  Plan of care signed:    [x] Yes  [] No    Date of Patient follow up with Physician: ?     Progress Report: []  Yes  [x]  No     Date Range for reporting period:  Beginnin2022  Ending:  NA    Progress report due (10 Rx/or 30 days whichever is less):      Recertification due (POC duration/ or 90 days whichever is less):      Visit # Insurance Allowable Auth Needed   3/12 BMN []Yes   [x]No     RESTRICTIONS/PRECAUTIONS: HTN, osteopenia, OA, shortness of breath, neuropathy, meningioma excision T-spine  Latex Allergy:  [x]NO      []YES  Preferred Language for Healthcare:   [x]English       []other:  Functional Scale: FOTO balance confidence Date assessed:2022    Pain level:  0/10     SUBJECTIVE:  Pt notes no complaints after last visit. Is hard to not lean with exercises with HEP. OBJECTIVE:     Exercises/Interventions: Exercises in bold performed in department today. Items not bolded are carried forward from prior visits for continuity of the record.     Exercise/Equipment Resistance/Repetitions Other comments   Nustep, seat 7, no arms, Lv 3 5 mins    Standing hip ext 2 X 10 each B fatigue   Standing hip abd 2 X 10 each B fatigue   Standing marches 2 X 10 each B fatigue                                      Balance Feet together on foam, 3 x 15\", EO    Modified tandem, 2 x 15\" each way, EO    Feet together on firm, EC, 3 x 15\"    SLS, B 2 finger A, 2 x 10\" each    March walk x 2 laps in // bars, 1 UE A, 3 sec hold with march    Heel-toe walk x 2 laps in // bars, 1 UE A (Improved ability with feet together on firm)          Mod sway      L more challenging      fatigue                                   Home Exercise Program:Pt. demonstrated good understanding and knowledge of HEP. Written instructions provided. 07/25/2022: none initiated today  07/27/2022: standing hip abd, ext, marching 1x per day, 1-2 sets of 10 reps  08/02/2022: balance feet together, modified tandem. Access Code [email or web address]    Therapeutic Exercise:   [x] (45881) Provided verbal/tactile cueing for activities related to strengthening, flexibility, endurance, ROM for improvements in LE, proximal hip, and core control with self-care, mobility, lifting, ambulation. NMR:  [x] (18676) Provided verbal/tactile cueing for activities related to improving balance, coordination, kinesthetic sense, posture, motor skill, proprioception to assist with LE, proximal hip, and core control in self-care, mobility, lifting, ambulation and eccentric single leg control. Therapeutic Activities:    [] (16158) Provided verbal/tactile cueing for activities related to improving balance, coordination, kinesthetic sense, posture, motor skill, proprioception and motor activation to allow for proper function of core, proximal hip and LE with self-care and ADLs and functional mobility.      Gait Training:    [] (65227) Provided training and instruction to the patient for proper LE, core and proximal hip recruitment and positioning and eccentric body weight control with ambulation re-education including up and down stairs     Manual Treatments:  PROM / STM / Oscillations-Mobs:  G-I, II, III, IV (PA's, Inf., Post.)  [] (81142) Provided manual therapy to mobilize LE, proximal hip and/or LS spine soft tissue/joints for the purpose of modulating pain, promoting relaxation,  increasing ROM, reducing/eliminating soft tissue swelling/inflammation/restriction, improving soft tissue extensibility and allowing for proper ROM for normal function with self-care, mobility, lifting and ambulation. Home Exercise Program:    [x] (01145) Reviewed/Progressed HEP activities related to strengthening, flexibility, endurance, ROM of core, proximal hip and LE for functional self-care, mobility, lifting and ambulation/stair navigation   [] (33562) Reviewed/Progressed HEP activities related to improving balance, coordination, kinesthetic sense, posture, motor skill, proprioception of core, proximal hip and LE for self-care, mobility, lifting, and ambulation/stair navigation      Modalities:    [] Electric Stimulation:   [] Ultrasound:   [] Other:       Charges:  Timed Code Treatment Minutes: TE: 17, NMR: 23   Total Treatment Minutes: 40      [] EVAL (LOW) 62452 (typically 20 minutes face-to-face)  [] EVAL (MOD) 20036 (typically 30 minutes face-to-face)  [] EVAL (HIGH) 33868 (typically 45 minutes face-to-face)  [] RE-EVAL     [x] IW(34744) x  1     [x] NMR (02576) x  2    [] Manual (68035) x       [] TA (22853) x      [] Gait Training [phone] x       [] ES(attended) (86339)  [] ES (un) (04142)   [] DRY NEEDLE 1 OR 2 MUSCLES  [] DRY NEEDLE 3+ MUSCLES  [] Mech Traction (70562)  [] Ultrasound (76637)  [] Other:    IGOALS:   Patient stated goal: \"I would just like for it to improve, get the steadiness back when I walk. \"  [] Progressing: [] Met: [] Not Met: [] Adjusted     Therapist goals for Patient:  Short Term Goals: To be achieved in: 3 weeks  1. Independent in initial HEP per patient tolerance, in order to prevent re-injury. [] Progressing: [] Met: [] Not Met: [] Adjusted  2. Patient will improve SLS to 3 seconds B for improved balance  [] Progressing: [] Met: [] Not Met: [] Adjusted     Long Term Goals: To be achieved in: 6 weeks  1.  Disability index score of 51 or better on FOTO Balance confidence to assist with reaching prior level of function. [] Progressing: [] Met: [] Not Met: [] Adjusted  2. Patient will demonstrate increased SLS to 6 seconds B for rare feelings of unsteadiness with ambulating over uneven terrain   [] Progressing: [] Met: [] Not Met: [] Adjusted  3. Patient will demonstrate an increase in Strength B LE 4+/5 to allow for prolonged standing  [] Progressing: [] Met: [] Not Met: [] Adjusted  4. Pt will be able to ambulate with trace Trendelenburg gait pattern and rare sensations of unsteadiness during ambulation, especially when initiating ambulation. [] Progressing: [] Met: [] Not Met: [] Adjusted  5. Independent in HEP progressions per patient tolerance, in order to prevent re-injury. [] Progressing: [] Met: [] Not Met: [] Adjusted    ASSESSMENT:  Pt with good tolerance with new exercises/progressions. Pt will benefit from additional therapy to address deficits to return to PLOF. Treatment/Activity Tolerance:  [x] Patient tolerated treatment well [] Patient limited by fatique  [] Patient limited by pain  [] Patient limited by other medical complications  [] Other:     Overall Progression Towards Functional goals/ Treatment Progress Update:  [] Patient is progressing as expected towards functional goals listed. [] Progression is slowed due to complexities/Impairments listed. [] Progression has been slowed due to co-morbidities. [x] Plan just implemented, too soon to assess goals progression <30days   [] Goals require adjustment due to lack of progress  [] Patient is not progressing as expected and requires additional follow up with physician  [] Other    Prognosis for POC: [x] Good [] Fair  [] Poor    Patient requires continued skilled intervention: [x] Yes  [] No        PLAN:   [x] Continue per plan of care [] Alter current plan (see comments)  [] Plan of care initiated [] Hold pending MD visit [] Discharge    Electronically signed by:  Martha Haley, PT, DPT [id number]    Note: If patient does not return for scheduled/recommended follow up visits, this note will serve as a discharge from care along with the most recent update on progress.

## 2022-08-04 ENCOUNTER — HOSPITAL ENCOUNTER (OUTPATIENT)
Dept: PHYSICAL THERAPY | Age: 82
Setting detail: THERAPIES SERIES
Discharge: HOME | End: 2022-08-04
Payer: MEDICARE

## 2022-08-04 PROCEDURE — 97112 NEUROMUSCULAR REEDUCATION: CPT

## 2022-08-04 PROCEDURE — 97110 THERAPEUTIC EXERCISES: CPT

## 2022-08-04 NOTE — FLOWSHEET NOTE
[facility] Outpatient [facility]  [address] [place] [address], [address], [address]  Phone: [phone]   Fax: [phone]    Physical Therapy Treatment Note/ Progress Report:     Date:  2022    Patient Name:  Annabel Singer    :  1940  MRN: [id number]    Medical/Treatment Diagnosis Information:  Diagnosis: Unsteady gait (R26.81)  Treatment Diagnosis: generalized muscle weakness M62.81, gait abnormality B07.5  Insurance/Certification information:  PT Insurance Information: Medicare, [employer] [place]  Physician Information:   Elen Lundy MD  Plan of care signed:    [x] Yes  [] No    Date of Patient follow up with Physician: ?     Progress Report: []  Yes  [x]  No     Date Range for reporting period:  Beginnin2022  Ending:  NA    Progress report due (10 Rx/or 30 days whichever is less):      Recertification due (POC duration/ or 90 days whichever is less):      Visit # Insurance Allowable Auth Needed    BMN []Yes   [x]No     RESTRICTIONS/PRECAUTIONS: HTN, osteopenia, OA, shortness of breath, neuropathy, meningioma excision T-spine  Latex Allergy:  [x]NO      []YES  Preferred Language for Healthcare:   [x]English       []other:  Functional Scale: FOTO balance confidence Date assessed:2022    Pain level:  0/10     SUBJECTIVE:  No complaints after last visit. HEP going well. OBJECTIVE:     Exercises/Interventions: Exercises in bold performed in department today. Items not bolded are carried forward from prior visits for continuity of the record.     Exercise/Equipment Resistance/Repetitions Other comments   Nustep, seat 7, no arms, Lv 3 6 mins    Standing hip ext 2 X 10 each B Fatigue, B UE A   Standing hip abd 2 X 10 each B Fatigue, B UE A   Standing marches X 20 each B Fatigue, 1 UE A   Mini squats X 10    Side step in // bars X 3 laps B UE A                            Balance Feet together on foam, 3 x 20\", EO    Tandem stance, 2 x 10\" each way, EO    Feet together on firm, EC, 3 x 15\"    SLS, B 2 finger A, 2 x 10\" each    March walk x 2 laps in // bars, 1 UE A, 3 sec hold with march    Heel-toe walk x 2 laps in // bars, 1 UE A    Toe taps at bottom step  1 x 15\" 1 UE A  2 x 15\" no UE A - CGA             Mild-Mod sway      L more challenging      fatigue                                   Home Exercise Program:Pt. demonstrated good understanding and knowledge of HEP. Written instructions provided. 07/25/2022: none initiated today  07/27/2022: standing hip abd, ext, marching 1x per day, 1-2 sets of 10 reps  08/02/2022: balance feet together, modified tandem. Access Code [email or web address]  08/04/2022: progress from modified tandem to tandem as able    Therapeutic Exercise:   [x] (34196) Provided verbal/tactile cueing for activities related to strengthening, flexibility, endurance, ROM for improvements in LE, proximal hip, and core control with self-care, mobility, lifting, ambulation. NMR:  [x] (70352) Provided verbal/tactile cueing for activities related to improving balance, coordination, kinesthetic sense, posture, motor skill, proprioception to assist with LE, proximal hip, and core control in self-care, mobility, lifting, ambulation and eccentric single leg control. Therapeutic Activities:    [] (96119) Provided verbal/tactile cueing for activities related to improving balance, coordination, kinesthetic sense, posture, motor skill, proprioception and motor activation to allow for proper function of core, proximal hip and LE with self-care and ADLs and functional mobility.      Gait Training:    [] (98856) Provided training and instruction to the patient for proper LE, core and proximal hip recruitment and positioning and eccentric body weight control with ambulation re-education including up and down stairs     Manual Treatments:  PROM / STM / Oscillations-Mobs:  G-I, II, III, IV (PA's, Inf., Post.)  [] (52544) Provided manual therapy to mobilize LE, proximal hip and/or LS spine soft tissue/joints for the purpose of modulating pain, promoting relaxation,  increasing ROM, reducing/eliminating soft tissue swelling/inflammation/restriction, improving soft tissue extensibility and allowing for proper ROM for normal function with self-care, mobility, lifting and ambulation. Home Exercise Program:    [x] (44992) Reviewed/Progressed HEP activities related to strengthening, flexibility, endurance, ROM of core, proximal hip and LE for functional self-care, mobility, lifting and ambulation/stair navigation   [] (13945) Reviewed/Progressed HEP activities related to improving balance, coordination, kinesthetic sense, posture, motor skill, proprioception of core, proximal hip and LE for self-care, mobility, lifting, and ambulation/stair navigation      Modalities:    [] Electric Stimulation:   [] Ultrasound:   [] Other:       Charges:  Timed Code Treatment Minutes: TE: 18, NMR: 23   Total Treatment Minutes: 41      [] EVAL (LOW) 90587 (typically 20 minutes face-to-face)  [] EVAL (MOD) 52854 (typically 30 minutes face-to-face)  [] EVAL (HIGH) 32699 (typically 45 minutes face-to-face)  [] RE-EVAL     [x] NR(93880) x  1     [x] NMR (82259) x  2    [] Manual (06108) x       [] TA (06420) x      [] Gait Training [phone] x       [] ES(attended) (39727)  [] ES (un) (13206)   [] DRY NEEDLE 1 OR 2 MUSCLES  [] DRY NEEDLE 3+ MUSCLES  [] Mech Traction (85792)  [] Ultrasound (44973)  [] Other:    IGOALS:   Patient stated goal: \"I would just like for it to improve, get the steadiness back when I walk. \"  [] Progressing: [] Met: [] Not Met: [] Adjusted     Therapist goals for Patient:  Short Term Goals: To be achieved in: 3 weeks  1. Independent in initial HEP per patient tolerance, in order to prevent re-injury. [] Progressing: [] Met: [] Not Met: [] Adjusted  2.  Patient will improve SLS to 3 seconds B for improved balance  [] Progressing: [] Met: [] Not Met: [] Adjusted     Long Term Goals: To be achieved in: 6 weeks  1. Disability index score of 51 or better on FOTO Balance confidence to assist with reaching prior level of function. [] Progressing: [] Met: [] Not Met: [] Adjusted  2. Patient will demonstrate increased SLS to 6 seconds B for rare feelings of unsteadiness with ambulating over uneven terrain   [] Progressing: [] Met: [] Not Met: [] Adjusted  3. Patient will demonstrate an increase in Strength B LE 4+/5 to allow for prolonged standing  [] Progressing: [] Met: [] Not Met: [] Adjusted  4. Pt will be able to ambulate with trace Trendelenburg gait pattern and rare sensations of unsteadiness during ambulation, especially when initiating ambulation. [] Progressing: [] Met: [] Not Met: [] Adjusted  5. Independent in HEP progressions per patient tolerance, in order to prevent re-injury. [] Progressing: [] Met: [] Not Met: [] Adjusted    ASSESSMENT:  Pt with good tolerance to balance exercise progressions and added strengthening exercises without increased complaints. Pt will benefit from additional therapy to address deficits to return to PLOF. Treatment/Activity Tolerance:  [x] Patient tolerated treatment well [] Patient limited by fatique  [] Patient limited by pain  [] Patient limited by other medical complications  [] Other:     Overall Progression Towards Functional goals/ Treatment Progress Update:  [] Patient is progressing as expected towards functional goals listed. [] Progression is slowed due to complexities/Impairments listed. [] Progression has been slowed due to co-morbidities.   [x] Plan just implemented, too soon to assess goals progression <30days   [] Goals require adjustment due to lack of progress  [] Patient is not progressing as expected and requires additional follow up with physician  [] Other    Prognosis for POC: [x] Good [] Fair  [] Poor    Patient requires continued skilled intervention: [x] Yes  [] No        PLAN:   [x] Continue per plan of care [] Alter current plan (see comments)  [] Plan of care initiated [] Hold pending MD visit [] Discharge    Electronically signed by: Fred Watt PT, DPT [id number]    Note: If patient does not return for scheduled/recommended follow up visits, this note will serve as a discharge from care along with the most recent update on progress.

## 2022-08-08 ENCOUNTER — HOSPITAL ENCOUNTER (OUTPATIENT)
Dept: PHYSICAL THERAPY | Age: 82
Setting detail: THERAPIES SERIES
Discharge: HOME | End: 2022-08-08
Payer: MEDICARE

## 2022-08-08 PROCEDURE — 97112 NEUROMUSCULAR REEDUCATION: CPT | Performed by: PHYSICAL THERAPIST

## 2022-08-08 PROCEDURE — 97110 THERAPEUTIC EXERCISES: CPT | Performed by: PHYSICAL THERAPIST

## 2022-08-08 NOTE — FLOWSHEET NOTE
[facility] Outpatient Therapy  [address] [place] [address], [address], [address]  Phone: [phone]   Fax: [phone]    Physical Therapy Treatment Note/ Progress Report:     Date:  2022    Patient Name:  Amanda Hansen    :  1940  MRN: [id number]    Medical/Treatment Diagnosis Information:  Diagnosis: Unsteady gait (R26.81)  Treatment Diagnosis: generalized muscle weakness M62.81, gait abnormality Q98.0  Insurance/Certification information:  PT Insurance Information: Medicare, Medical [place]  Physician Information:   Solo Cardoso MD  Plan of care signed:    [x] Yes  [] No    Date of Patient follow up with Physician: ?     Progress Report: []  Yes  [x]  No     Date Range for reporting period:  Beginnin2022  Ending:  NA    Progress report due (10 Rx/or 30 days whichever is less):      Recertification due (POC duration/ or 90 days whichever is less):      Visit # Insurance Allowable Auth Needed    BMN []Yes   [x]No     RESTRICTIONS/PRECAUTIONS: HTN, osteopenia, OA, shortness of breath, neuropathy, meningioma excision T-spine  Latex Allergy:  [x]NO      []YES  Preferred Language for Healthcare:   [x]English       []other:  Functional Scale: FOTO balance confidence Date assessed:2022    Pain level:  0/10     SUBJECTIVE:  Patient states that she is doing well. No complaints of pain today. OBJECTIVE:     Exercises/Interventions: Exercises in bold performed in department today. Items not bolded are carried forward from prior visits for continuity of the record.     Exercise/Equipment Resistance/Repetitions Other comments   Nustep, seat 7, no arms, Lv 3 6 mins    Standing hip ext 2 X 10 each B Fatigue, B UE A   Standing hip abd 2 X 10 each B Fatigue, B UE A   Standing marches X 20 each B Fatigue, 1 UE A   Mini squats X 15    Side step in // bars X 3 laps B UE A                            Balance Feet together on foam, 3 x 20\", EO    Tandem stance, 2 x 10\" each way, EO    Feet together on firm, EC, 3 x 15\"    SLS, B 2 finger A, 2 x 10\" each    March walk x 2 laps in // bars, 1 UE A, 3 sec hold with march    Heel-toe walk x 2 laps in // bars, 1 UE A    Toe taps at bottom step  2 x 20 bilat UE A             Mild-Mod sway      L more challenging      fatigue   Step up onto 6\" step 15 x R/L, bilat UE A                               Home Exercise Program:Pt. demonstrated good understanding and knowledge of HEP. Written instructions provided. 07/25/2022: none initiated today  07/27/2022: standing hip abd, ext, marching 1x per day, 1-2 sets of 10 reps  08/02/2022: balance feet together, modified tandem. Access Code [email or web address]  08/04/2022: progress from modified tandem to tandem as able    Therapeutic Exercise:   [x] (28801) Provided verbal/tactile cueing for activities related to strengthening, flexibility, endurance, ROM for improvements in LE, proximal hip, and core control with self-care, mobility, lifting, ambulation. NMR:  [x] (96305) Provided verbal/tactile cueing for activities related to improving balance, coordination, kinesthetic sense, posture, motor skill, proprioception to assist with LE, proximal hip, and core control in self-care, mobility, lifting, ambulation and eccentric single leg control. Therapeutic Activities:    [] (82672) Provided verbal/tactile cueing for activities related to improving balance, coordination, kinesthetic sense, posture, motor skill, proprioception and motor activation to allow for proper function of core, proximal hip and LE with self-care and ADLs and functional mobility.      Gait Training:    [] (91554) Provided training and instruction to the patient for proper LE, core and proximal hip recruitment and positioning and eccentric body weight control with ambulation re-education including up and down stairs     Manual Treatments:  PROM / STM / Oscillations-Mobs:  G-I, II, III, IV (PA's, Inf., Post.)  [] (62641) Provided manual therapy to mobilize LE, proximal hip and/or LS spine soft tissue/joints for the purpose of modulating pain, promoting relaxation,  increasing ROM, reducing/eliminating soft tissue swelling/inflammation/restriction, improving soft tissue extensibility and allowing for proper ROM for normal function with self-care, mobility, lifting and ambulation. Home Exercise Program:    [x] (20302) Reviewed/Progressed HEP activities related to strengthening, flexibility, endurance, ROM of core, proximal hip and LE for functional self-care, mobility, lifting and ambulation/stair navigation   [] (90403) Reviewed/Progressed HEP activities related to improving balance, coordination, kinesthetic sense, posture, motor skill, proprioception of core, proximal hip and LE for self-care, mobility, lifting, and ambulation/stair navigation      Modalities:    [] Electric Stimulation:   [] Ultrasound:   [] Other:       Charges:  Timed Code Treatment Minutes: TE: 23, NMR: 18   Total Treatment Minutes: 41     [] RE-EVAL     [x] ZM(90252) x  2     [x] NMR (17233) x  1    [] Manual (78299) x       [] TA (66790) x      [] Gait Training [phone] x       [] ES(attended) (52565)  [] ES (un) (73477)   [] DRY NEEDLE 1 OR 2 MUSCLES  [] DRY NEEDLE 3+ MUSCLES  [] Mech Traction (35624)  [] Ultrasound (25409)  [] Other:    IGOALS:   Patient stated goal: \"I would just like for it to improve, get the steadiness back when I walk. \"  [] Progressing: [] Met: [] Not Met: [] Adjusted     Therapist goals for Patient:  Short Term Goals: To be achieved in: 3 weeks  1. Independent in initial HEP per patient tolerance, in order to prevent re-injury. [] Progressing: [] Met: [] Not Met: [] Adjusted  2. Patient will improve SLS to 3 seconds B for improved balance  [] Progressing: [] Met: [] Not Met: [] Adjusted     Long Term Goals: To be achieved in: 6 weeks  1.  Disability index score of 51 or better on FOTO Balance confidence to assist with reaching prior level of function. [] Progressing: [] Met: [] Not Met: [] Adjusted  2. Patient will demonstrate increased SLS to 6 seconds B for rare feelings of unsteadiness with ambulating over uneven terrain   [] Progressing: [] Met: [] Not Met: [] Adjusted  3. Patient will demonstrate an increase in Strength B LE 4+/5 to allow for prolonged standing  [] Progressing: [] Met: [] Not Met: [] Adjusted  4. Pt will be able to ambulate with trace Trendelenburg gait pattern and rare sensations of unsteadiness during ambulation, especially when initiating ambulation. [] Progressing: [] Met: [] Not Met: [] Adjusted  5. Independent in HEP progressions per patient tolerance, in order to prevent re-injury. [] Progressing: [] Met: [] Not Met: [] Adjusted    ASSESSMENT:  Pt with good tolerance to balance exercise progressions and added strengthening exercises without increased complaints. Pt will benefit from additional therapy to address deficits to return to PLOF. Treatment/Activity Tolerance:  [x] Patient tolerated treatment well [] Patient limited by fatique  [] Patient limited by pain  [] Patient limited by other medical complications  [] Other:     Overall Progression Towards Functional goals/ Treatment Progress Update:  [] Patient is progressing as expected towards functional goals listed. [] Progression is slowed due to complexities/Impairments listed. [] Progression has been slowed due to co-morbidities.   [x] Plan just implemented, too soon to assess goals progression <30days   [] Goals require adjustment due to lack of progress  [] Patient is not progressing as expected and requires additional follow up with physician  [] Other    Prognosis for POC: [x] Good [] Fair  [] Poor    Patient requires continued skilled intervention: [x] Yes  [] No        PLAN:   [x] Continue per plan of care [] Alter current plan (see comments)  [] Plan of care initiated [] Hold pending MD visit [] Discharge    Electronically signed by: Lux Stone, PT, DPT     Note: If patient does not return for scheduled/recommended follow up visits, this note will serve as a discharge from care along with the most recent update on progress.

## 2022-08-10 ENCOUNTER — HOSPITAL ENCOUNTER (OUTPATIENT)
Dept: PHYSICAL THERAPY | Age: 82
Setting detail: THERAPIES SERIES
Discharge: HOME | End: 2022-08-10
Payer: MEDICARE

## 2022-08-10 PROCEDURE — 97112 NEUROMUSCULAR REEDUCATION: CPT | Performed by: PHYSICAL THERAPIST

## 2022-08-10 PROCEDURE — 97110 THERAPEUTIC EXERCISES: CPT | Performed by: PHYSICAL THERAPIST

## 2022-08-10 NOTE — FLOWSHEET NOTE
[facility] Outpatient Therapy  [address] [employer], [address], [address]  Phone: [phone]   Fax: [phone]    Physical Therapy Treatment Note/ Progress Report:     Date:  8/10/2022    Patient Name:  Colette Fishman    :  1940  MRN: [id number]    Medical/Treatment Diagnosis Information:  Diagnosis: Unsteady gait (R26.81)  Treatment Diagnosis: generalized muscle weakness M62.81, gait abnormality C84.7  Insurance/Certification information:  PT Insurance Information: Medicare, Medical [place]  Physician Information:  Star Hyde MD  Plan of care signed:    [x] Yes  [] No    Date of Patient follow up with Physician: ?     Progress Report: []  Yes  [x]  No     Date Range for reporting period:  Beginnin2022  Ending:  NA    Progress report due (10 Rx/or 30 days whichever is less):      Recertification due (POC duration/ or 90 days whichever is less):      Visit # Insurance Allowable Auth Needed    BMN []Yes   [x]No     RESTRICTIONS/PRECAUTIONS: HTN, osteopenia, OA, shortness of breath, neuropathy, meningioma excision T-spine  Latex Allergy:  [x]NO      []YES  Preferred Language for Healthcare:   [x]English       []other:  Functional Scale: FOTO balance confidence Date assessed:2022    Pain level:  0/10     SUBJECTIVE:       OBJECTIVE:     Exercises/Interventions: Exercises in bold performed in department today. Items not bolded are carried forward from prior visits for continuity of the record.     Exercise/Equipment Resistance/Repetitions Other comments   Nustep, seat 7, no arms, Lv 4 6 mins    Standing hip ext 2 X 10 each B Fatigue, B UE A   Standing hip abd 2 X 10 each B Fatigue, B UE A   Standing marches X 20 each B Fatigue, 1 UE A   Mini squats X 20    Side step in // bars X 3 laps B UE A                            Balance Feet together on foam, 3 x 20\", EO    Semi-Tandem on foam  2 x 20\" each, EO    Tandem stance, 2 x 10\" each way, EO    Feet together on firm, EC, 3 x 15\"    SLS, B 2 finger A, 2 x 10\" each    March walk x 2 laps in // bars, 1 UE A, 3 sec hold with march    Heel-toe walk x 2 laps in // bars, 1 UE A    Toe taps at bottom step  2 x 20 bilat UE A                   Mild-Mod sway      L more challenging      fatigue   Step up onto 6\" step 15 x R/L, bilat UE A                               Home Exercise Program:Pt. demonstrated good understanding and knowledge of HEP. Written instructions provided. 07/25/2022: none initiated today  07/27/2022: standing hip abd, ext, marching 1x per day, 1-2 sets of 10 reps  08/02/2022: balance feet together, modified tandem. Access Code [email or web address]  08/04/2022: progress from modified tandem to tandem as able    Therapeutic Exercise:   [x] (90464) Provided verbal/tactile cueing for activities related to strengthening, flexibility, endurance, ROM for improvements in LE, proximal hip, and core control with self-care, mobility, lifting, ambulation. NMR:  [x] (63915) Provided verbal/tactile cueing for activities related to improving balance, coordination, kinesthetic sense, posture, motor skill, proprioception to assist with LE, proximal hip, and core control in self-care, mobility, lifting, ambulation and eccentric single leg control. Therapeutic Activities:    [] (74518) Provided verbal/tactile cueing for activities related to improving balance, coordination, kinesthetic sense, posture, motor skill, proprioception and motor activation to allow for proper function of core, proximal hip and LE with self-care and ADLs and functional mobility.      Gait Training:    [] (31789) Provided training and instruction to the patient for proper LE, core and proximal hip recruitment and positioning and eccentric body weight control with ambulation re-education including up and down stairs     Manual Treatments:  PROM / STM / Oscillations-Mobs:  G-I, II, III, IV (PA's, Inf., Post.)  [] (72599) Provided manual therapy to mobilize LE, proximal hip and/or LS spine soft tissue/joints for the purpose of modulating pain, promoting relaxation,  increasing ROM, reducing/eliminating soft tissue swelling/inflammation/restriction, improving soft tissue extensibility and allowing for proper ROM for normal function with self-care, mobility, lifting and ambulation. Home Exercise Program:    [x] (02710) Reviewed/Progressed HEP activities related to strengthening, flexibility, endurance, ROM of core, proximal hip and LE for functional self-care, mobility, lifting and ambulation/stair navigation   [] (42329) Reviewed/Progressed HEP activities related to improving balance, coordination, kinesthetic sense, posture, motor skill, proprioception of core, proximal hip and LE for self-care, mobility, lifting, and ambulation/stair navigation      Modalities:    [] Electric Stimulation:   [] Ultrasound:   [] Other:       Charges:  Timed Code Treatment Minutes: TE: 25, NMR: 14   Total Treatment Minutes: 39     [] RE-EVAL     [x] JG(36633) x  2     [x] NMR (35092) x  1    [] Manual (03481) x       [] TA (86840) x      [] Gait Training [phone] x       [] ES(attended) (47215)  [] ES (un) ([phone])   [] DRY NEEDLE 1 OR 2 MUSCLES  [] DRY NEEDLE 3+ MUSCLES  [] Mech Traction (27605)  [] Ultrasound (07796)  [] Other:    GOALS:  Patient stated goal: \"I would just like for it to improve, get the steadiness back when I walk. \"  [] Progressing: [] Met: [] Not Met: [] Adjusted     Therapist goals for Patient:  Short Term Goals: To be achieved in: 3 weeks  1. Independent in initial HEP per patient tolerance, in order to prevent re-injury. [] Progressing: [] Met: [] Not Met: [] Adjusted  2. Patient will improve SLS to 3 seconds B for improved balance  [] Progressing: [] Met: [] Not Met: [] Adjusted     Long Term Goals: To be achieved in: 6 weeks  1.  Disability index score of 51 or better on FOTO Balance confidence to assist with reaching prior level of PT, DPT     Note: If patient does not return for scheduled/recommended follow up visits, this note will serve as a discharge from care along with the most recent update on progress.

## 2022-08-16 ENCOUNTER — HOSPITAL ENCOUNTER (OUTPATIENT)
Dept: PHYSICAL THERAPY | Age: 82
Setting detail: THERAPIES SERIES
Discharge: HOME | End: 2022-08-16
Payer: MEDICARE

## 2022-08-16 PROCEDURE — 97112 NEUROMUSCULAR REEDUCATION: CPT

## 2022-08-16 PROCEDURE — 97110 THERAPEUTIC EXERCISES: CPT

## 2022-08-16 NOTE — FLOWSHEET NOTE
[facility] Outpatient [facility]  [address] [place] [address], [address] [place] [address], [address] [place] [address] Av  Phone: [phone]   Fax: [phone]    Physical Therapy Treatment Note/ Progress Report:     Date:  2022    Patient Name:  Chela Martinez    :  1940  MRN: [id number]    Medical/Treatment Diagnosis Information:  Diagnosis: Unsteady gait (R26.81)  Treatment Diagnosis: generalized muscle weakness M62.81, gait abnormality D01.2  Insurance/Certification information:  PT Insurance Information: Medicare, Medical [place]  Physician Information:  Jose Handley MD  Plan of care signed:    [x] Yes  [] No    Date of Patient follow up with Physician: ?     Progress Report: []  Yes  [x]  No     Date Range for reporting period:  Beginnin2022  Ending:  NA    Progress report due (10 Rx/or 30 days whichever is less):      Recertification due (POC duration/ or 90 days whichever is less):      Visit # Insurance Allowable Auth Needed    BMN []Yes   [x]No     RESTRICTIONS/PRECAUTIONS: HTN, osteopenia, OA, shortness of breath, neuropathy, meningioma excision T-spine  Latex Allergy:  [x]NO      []YES  Preferred Language for Healthcare:   [x]English       []other:  Functional Scale: FOTO balance confidence Date assessed:2022    Pain level:  0/10     SUBJECTIVE:   Pt notes that she feels better than when she started PT - easier to maneuver in some places, still difficult on soccer fields and when walking slowly behind a crowd. OBJECTIVE:     Exercises/Interventions: Exercises in bold performed in department today. Items not bolded are carried forward from prior visits for continuity of the record.     Exercise/Equipment Resistance/Repetitions Other comments   Nustep, seat 7, no arms, Lv 4 5.5 mins Then felt cramping L hip so stopped   Standing hip ext X 20 each B Fatigue, B UE A   Standing hip abd X 20 each B Fatigue, B UE A   Standing marches X 20 each B Fatigue, 1 UE A   Mini squats X 20    Side step in // bars X 3 laps B UE A                            Balance Feet together on foam, 3 x 20\", EO    Semi-Tandem on foam  2 x 20\" each, EO    Tandem stance, 2 x 10\" each way, EO    Feet together on firm, EC, 3 x 15\"    SLS, B 2 finger A, 2 x 10\" each    March walk x 3 laps in // bars, 1 UE A, 3 sec hold with march    Heel-toe walk x 3 laps in // bars, 1 UE A    Toe taps at bottom step  2 x 20 1 UE A                   Mild-Mod sway      L more challenging         Step up onto 6\" step 15 x R/L, 1 UE A                               Home Exercise Program:Pt. demonstrated good understanding and knowledge of HEP. Written instructions provided. 07/25/2022: none initiated today  07/27/2022: standing hip abd, ext, marching 1x per day, 1-2 sets of 10 reps  08/02/2022: balance feet together, modified tandem. Access Code [email or web address]  08/04/2022: progress from modified tandem to tandem as able    Therapeutic Exercise:   [x] (00036) Provided verbal/tactile cueing for activities related to strengthening, flexibility, endurance, ROM for improvements in LE, proximal hip, and core control with self-care, mobility, lifting, ambulation. NMR:  [x] (10098) Provided verbal/tactile cueing for activities related to improving balance, coordination, kinesthetic sense, posture, motor skill, proprioception to assist with LE, proximal hip, and core control in self-care, mobility, lifting, ambulation and eccentric single leg control. Therapeutic Activities:    [] (80555) Provided verbal/tactile cueing for activities related to improving balance, coordination, kinesthetic sense, posture, motor skill, proprioception and motor activation to allow for proper function of core, proximal hip and LE with self-care and ADLs and functional mobility.      Gait Training:    [] (23778) Provided training and instruction to the patient for proper LE, core and proximal hip recruitment and positioning and eccentric body weight control with ambulation re-education including up and down stairs     Manual Treatments:  PROM / STM / Oscillations-Mobs:  G-I, II, III, IV (PA's, Inf., Post.)  [] (49981) Provided manual therapy to mobilize LE, proximal hip and/or LS spine soft tissue/joints for the purpose of modulating pain, promoting relaxation,  increasing ROM, reducing/eliminating soft tissue swelling/inflammation/restriction, improving soft tissue extensibility and allowing for proper ROM for normal function with self-care, mobility, lifting and ambulation. Home Exercise Program:    [x] (92244) Reviewed/Progressed HEP activities related to strengthening, flexibility, endurance, ROM of core, proximal hip and LE for functional self-care, mobility, lifting and ambulation/stair navigation   [] (81061) Reviewed/Progressed HEP activities related to improving balance, coordination, kinesthetic sense, posture, motor skill, proprioception of core, proximal hip and LE for self-care, mobility, lifting, and ambulation/stair navigation      Modalities:    [] Electric Stimulation:   [] Ultrasound:   [] Other:       Charges:  Timed Code Treatment Minutes: TE: 24, NMR: 14   Total Treatment Minutes: 38     [] RE-EVAL     [x] OA(20203) x  2     [x] NMR (24982) x  1    [] Manual (93233) x       [] TA (29352) x      [] Gait Training [phone] x       [] ES(attended) (32362)  [] ES (un) ([phone])   [] DRY NEEDLE 1 OR 2 MUSCLES  [] DRY NEEDLE 3+ MUSCLES  [] Mech Traction (32599)  [] Ultrasound (98147)  [] Other:    GOALS:  Patient stated goal: \"I would just like for it to improve, get the steadiness back when I walk. \"  [] Progressing: [] Met: [] Not Met: [] Adjusted     Therapist goals for Patient:  Short Term Goals: To be achieved in: 3 weeks  1. Independent in initial HEP per patient tolerance, in order to prevent re-injury. [] Progressing: [] Met: [] Not Met: [] Adjusted  2.  Patient will improve SLS to 3 seconds B for improved balance  [] Progressing: [] Met: [] Not Met: [] Adjusted     Long Term Goals: To be achieved in: 6 weeks  1. Disability index score of 51 or better on FOTO Balance confidence to assist with reaching prior level of function. [] Progressing: [] Met: [] Not Met: [] Adjusted  2. Patient will demonstrate increased SLS to 6 seconds B for rare feelings of unsteadiness with ambulating over uneven terrain   [] Progressing: [] Met: [] Not Met: [] Adjusted  3. Patient will demonstrate an increase in Strength B LE 4+/5 to allow for prolonged standing  [] Progressing: [] Met: [] Not Met: [] Adjusted  4. Pt will be able to ambulate with trace Trendelenburg gait pattern and rare sensations of unsteadiness during ambulation, especially when initiating ambulation. [] Progressing: [] Met: [] Not Met: [] Adjusted  5. Independent in HEP progressions per patient tolerance, in order to prevent re-injury. [] Progressing: [] Met: [] Not Met: [] Adjusted    ASSESSMENT:  Overall pt noting improvements since beginning PT but still with deficits with ambulating over soccer fields and when walking slowly behind a crowd. Pt will benefit from additional therapy to address deficits to return to PLOF. Treatment/Activity Tolerance:  [x] Patient tolerated treatment well [] Patient limited by fatique  [] Patient limited by pain  [] Patient limited by other medical complications  [] Other:     Overall Progression Towards Functional goals/ Treatment Progress Update:  [] Patient is progressing as expected towards functional goals listed. [] Progression is slowed due to complexities/Impairments listed. [] Progression has been slowed due to co-morbidities.   [x] Plan just implemented, too soon to assess goals progression <30days   [] Goals require adjustment due to lack of progress  [] Patient is not progressing as expected and requires additional follow up with physician  [] Other    Prognosis for POC: [x] Good [] Fair  [] Poor    Patient requires continued skilled

## 2022-08-18 ENCOUNTER — HOSPITAL ENCOUNTER (OUTPATIENT)
Dept: PHYSICAL THERAPY | Age: 82
Setting detail: THERAPIES SERIES
Discharge: HOME | End: 2022-08-18
Payer: MEDICARE

## 2022-08-18 PROCEDURE — 97110 THERAPEUTIC EXERCISES: CPT

## 2022-08-18 PROCEDURE — 97112 NEUROMUSCULAR REEDUCATION: CPT

## 2022-08-18 NOTE — FLOWSHEET NOTE
[facility] Outpatient Therapy  [address], [address], [address]  Phone: [phone]   Fax: [phone]    Physical Therapy Treatment Note/ Progress Report:     Date:  2022    Patient Name:  Yevgeniy Conde    :  1940  MRN: [id number]    Medical/Treatment Diagnosis Information:  Diagnosis: Unsteady gait (R26.81)  Treatment Diagnosis: generalized muscle weakness M62.81, gait abnormality J72.5  Insurance/Certification information:  PT Insurance Information: Medicare, Medical [place]  Physician Information:  Vane Hunt MD  Plan of care signed:    [x] Yes  [] No    Date of Patient follow up with Physician: ?     Progress Report: []  Yes  [x]  No     Date Range for reporting period:  Beginnin2022  Ending:  NA    Progress report due (10 Rx/or 30 days whichever is less):      Recertification due (POC duration/ or 90 days whichever is less):      Visit # Insurance Allowable Auth Needed    BMN []Yes   [x]No     RESTRICTIONS/PRECAUTIONS: HTN, osteopenia, OA, shortness of breath, neuropathy, meningioma excision T-spine  Latex Allergy:  [x]NO      []YES  Preferred Language for Healthcare:   [x]English       []other:  Functional Scale: FOTO balance confidence Date assessed:2022    Pain level:  0/10     SUBJECTIVE:   Pt notes was tired after last session but not out of the ordinary. OBJECTIVE:     Exercises/Interventions: Exercises in bold performed in department today. Items not bolded are carried forward from prior visits for continuity of the record.     Exercise/Equipment Resistance/Repetitions Other comments   Nustep, seat 7, no arms, Lv 4 6 mins    Standing hip ext X 20 each B Fatigue, B UE A   Standing hip abd X 20 each B Fatigue, B UE A   Standing marches, on foam X 20 each B Fatigue, 1 UE A   Mini squats on foam X 20    Side step in // bars X 3 laps B UE A                            Balance Feet together on foam, 3 x 20\", EO    Semi-Tandem on foam  2 x 20\" each, EO    Tandem stance, 2 x 10\" each way, EO    Feet together on firm, EC, 3 x 15\"    SLS, B 2 finger A, 2 x 10\" each    March walk x 3 laps in // bars, 1 UE A, 3 sec hold with march    Heel-toe walk x 3 laps in // bars, 1 UE A    Toe taps at bottom step  2 x 20 1 UE A    Carioca x 2 laps, B UE A             Left in front more challenging      Mild sway      R more challenging         Step up onto 6\" step  15 x R/L, 1 UE A                               Home Exercise Program:Pt. demonstrated good understanding and knowledge of HEP. Written instructions provided. 07/25/2022: none initiated today  07/27/2022: standing hip abd, ext, marching 1x per day, 1-2 sets of 10 reps  08/02/2022: balance feet together, modified tandem. Access Code [email or web address]  08/04/2022: progress from modified tandem to tandem as able    Therapeutic Exercise:   [x] (17037) Provided verbal/tactile cueing for activities related to strengthening, flexibility, endurance, ROM for improvements in LE, proximal hip, and core control with self-care, mobility, lifting, ambulation. NMR:  [x] (36043) Provided verbal/tactile cueing for activities related to improving balance, coordination, kinesthetic sense, posture, motor skill, proprioception to assist with LE, proximal hip, and core control in self-care, mobility, lifting, ambulation and eccentric single leg control. Therapeutic Activities:    [] (95501) Provided verbal/tactile cueing for activities related to improving balance, coordination, kinesthetic sense, posture, motor skill, proprioception and motor activation to allow for proper function of core, proximal hip and LE with self-care and ADLs and functional mobility.      Gait Training:    [] (58948) Provided training and instruction to the patient for proper LE, core and proximal hip recruitment and positioning and eccentric body weight control with ambulation re-education including up and down stairs Manual Treatments:  PROM / STM / Oscillations-Mobs:  G-I, II, III, IV (PA's, Inf., Post.)  [] (78348) Provided manual therapy to mobilize LE, proximal hip and/or LS spine soft tissue/joints for the purpose of modulating pain, promoting relaxation,  increasing ROM, reducing/eliminating soft tissue swelling/inflammation/restriction, improving soft tissue extensibility and allowing for proper ROM for normal function with self-care, mobility, lifting and ambulation. Home Exercise Program:    [x] (19714) Reviewed/Progressed HEP activities related to strengthening, flexibility, endurance, ROM of core, proximal hip and LE for functional self-care, mobility, lifting and ambulation/stair navigation   [] (74500) Reviewed/Progressed HEP activities related to improving balance, coordination, kinesthetic sense, posture, motor skill, proprioception of core, proximal hip and LE for self-care, mobility, lifting, and ambulation/stair navigation      Modalities:    [] Electric Stimulation:   [] Ultrasound:   [] Other:       Charges:  Timed Code Treatment Minutes: TE: 17, NMR: 23   Total Treatment Minutes: 40     [] RE-EVAL     [x] IV(97102) x  1     [x] NMR (41129) x  2    [] Manual (48416) x       [] TA (79653) x      [] Gait Training ([id number]) x       [] ES(attended) (17225)  [] ES (un) (98959)   [] DRY NEEDLE 1 OR 2 MUSCLES  [] DRY NEEDLE 3+ MUSCLES  [] Mech Traction (66356)  [] Ultrasound (58421)  [] Other:    GOALS:  Patient stated goal: \"I would just like for it to improve, get the steadiness back when I walk. \"  [] Progressing: [] Met: [] Not Met: [] Adjusted     Therapist goals for Patient:  Short Term Goals: To be achieved in: 3 weeks  1. Independent in initial HEP per patient tolerance, in order to prevent re-injury. [] Progressing: [] Met: [] Not Met: [] Adjusted  2. Patient will improve SLS to 3 seconds B for improved balance  [] Progressing: [] Met: [] Not Met: [] Adjusted     Long Term Goals:  To be achieved in: 6 weeks  1. Disability index score of 51 or better on FOTO Balance confidence to assist with reaching prior level of function. [] Progressing: [] Met: [] Not Met: [] Adjusted  2. Patient will demonstrate increased SLS to 6 seconds B for rare feelings of unsteadiness with ambulating over uneven terrain   [] Progressing: [] Met: [] Not Met: [] Adjusted  3. Patient will demonstrate an increase in Strength B LE 4+/5 to allow for prolonged standing  [] Progressing: [] Met: [] Not Met: [] Adjusted  4. Pt will be able to ambulate with trace Trendelenburg gait pattern and rare sensations of unsteadiness during ambulation, especially when initiating ambulation. [] Progressing: [] Met: [] Not Met: [] Adjusted  5. Independent in HEP progressions per patient tolerance, in order to prevent re-injury. [] Progressing: [] Met: [] Not Met: [] Adjusted    ASSESSMENT:  Pt with good tolerance to exercise progressions today without increased complaints. Pt will benefit from additional therapy to address deficits to return to PLOF. Treatment/Activity Tolerance:  [x] Patient tolerated treatment well [] Patient limited by fatique  [] Patient limited by pain  [] Patient limited by other medical complications  [] Other:     Overall Progression Towards Functional goals/ Treatment Progress Update:  [] Patient is progressing as expected towards functional goals listed. [] Progression is slowed due to complexities/Impairments listed. [] Progression has been slowed due to co-morbidities.   [x] Plan just implemented, too soon to assess goals progression <30days   [] Goals require adjustment due to lack of progress  [] Patient is not progressing as expected and requires additional follow up with physician  [] Other    Prognosis for POC: [x] Good [] Fair  [] Poor    Patient requires continued skilled intervention: [x] Yes  [] No        PLAN:   [x] Continue per plan of care [] Alter current plan (see comments)  [] Plan of care initiated [] Hold pending MD visit [] Discharge    Electronically signed by: Alexei Tavares, PT, DPT [id number]    Note: If patient does not return for scheduled/recommended follow up visits, this note will serve as a discharge from care along with the most recent update on progress.

## 2022-08-22 ENCOUNTER — HOSPITAL ENCOUNTER (OUTPATIENT)
Dept: PHYSICAL THERAPY | Age: 82
Setting detail: THERAPIES SERIES
Discharge: HOME | End: 2022-08-22
Payer: MEDICARE

## 2022-08-22 PROCEDURE — 97112 NEUROMUSCULAR REEDUCATION: CPT

## 2022-08-22 PROCEDURE — 97110 THERAPEUTIC EXERCISES: CPT

## 2022-08-22 NOTE — PROGRESS NOTES
[facility] Outpatient Therapy  [address], [address] [facility] [address], [address] [place] [address] Ave  Phone: [phone]   Fax: [phone]    Physical Therapy Treatment Note/ Progress Report:     Date:  2022    Patient Name:  Colette Fishman    :  1940  MRN: [id number]    Medical/Treatment Diagnosis Information:  Diagnosis: Unsteady gait (R26.81)  Treatment Diagnosis: generalized muscle weakness M62.81, gait abnormality E62.9  Insurance/Certification information:  PT Insurance Information: Medicare, Medical [place]  Physician Information:  Star Hyde MD  Plan of care signed:    [x] Yes  [] No    Date of Patient follow up with Physician: ?     Progress Report: [x]  Yes  []  No     Date Range for reporting period:  Beginnin2022  Endin2022  9 visits attended, 0 cancels, 0 no shows    Progress report due (10 Rx/or 30 days whichever is less):      Recertification due (POC duration/ or 90 days whichever is less):      Visit # Insurance Allowable Auth Needed    BMN []Yes   [x]No     RESTRICTIONS/PRECAUTIONS: HTN, osteopenia, OA, shortness of breath, neuropathy, meningioma excision T-spine  Latex Allergy:  [x]NO      []YES  Preferred Language for Healthcare:   [x]English       []other:  Functional Scale: FOTO balance confidence Date assessed:2022    Pain level:  0/10     SUBJECTIVE:   Pt feels that walking in grass in soccer fields has been better but still with difficulty with walking in crowds. Does feel is some improved with initiating ambulation in stop/go situations such as when in grocery and stops to get items. OBJECTIVE: FOTO balance confidence = 48. Strength B hip flexion 4/5, B hip abduction 4+/5 in sitting, L knee flex/ext 4/5, B ankle DF 5/5, R ankle INV/EV 5/5, L ankle INV 5/5, EV 4/5. SLS = unable. Exercises/Interventions: Exercises in bold performed in department today.   Items not bolded are carried forward from prior visits for continuity of the record. Exercise/Equipment Resistance/Repetitions Other comments   Nustep, seat 7, no arms, Lv 4 6 mins    Standing hip ext X 20 each B Fatigue, B UE A   Standing hip abd X 20 each B Fatigue, B UE A   Standing marches, on foam X 20 each B Fatigue, 1 UE A   Mini squats on foam X 20    Side step in // bars X 3 laps B UE A                            Balance Feet together on foam, 3 x 20\", EO    Semi-Tandem on foam  2 x 20\" each, EO    Tandem stance, 2 x 10\" each way, EO    Feet together on firm, EC, 3 x 15\"    SLS, B 2 finger A, 2 x 10\" each    March walk x 3 laps in // bars, 1 UE A, 3 sec hold with march    Heel-toe walk x 3 laps in // bars, 1 UE A    Toe taps at bottom step  2 x 20 1 UE A    Carioca x 2 laps, B UE A             Left in front more challenging      Mild sway      R more challenging         Step up onto 6\" step  15 x R/L, 1 UE A                               Home Exercise Program:Pt. demonstrated good understanding and knowledge of HEP. Written instructions provided. 07/25/2022: none initiated today  07/27/2022: standing hip abd, ext, marching 1x per day, 1-2 sets of 10 reps  08/02/2022: balance feet together, modified tandem. Access Code [email or web address]  08/04/2022: progress from modified tandem to tandem as able  08/22/2022: Mary Pineda. Access Code [email or web address]    Therapeutic Exercise:   [x] (02172) Provided verbal/tactile cueing for activities related to strengthening, flexibility, endurance, ROM for improvements in LE, proximal hip, and core control with self-care, mobility, lifting, ambulation. NMR:  [x] (53029) Provided verbal/tactile cueing for activities related to improving balance, coordination, kinesthetic sense, posture, motor skill, proprioception to assist with LE, proximal hip, and core control in self-care, mobility, lifting, ambulation and eccentric single leg control.      Therapeutic Activities:    [] (25676) Provided verbal/tactile cueing for activities related to improving balance, coordination, kinesthetic sense, posture, motor skill, proprioception and motor activation to allow for proper function of core, proximal hip and LE with self-care and ADLs and functional mobility. Gait Training:    [] (62598) Provided training and instruction to the patient for proper LE, core and proximal hip recruitment and positioning and eccentric body weight control with ambulation re-education including up and down stairs     Manual Treatments:  PROM / STM / Oscillations-Mobs:  G-I, II, III, IV (PA's, Inf., Post.)  [] (01580) Provided manual therapy to mobilize LE, proximal hip and/or LS spine soft tissue/joints for the purpose of modulating pain, promoting relaxation,  increasing ROM, reducing/eliminating soft tissue swelling/inflammation/restriction, improving soft tissue extensibility and allowing for proper ROM for normal function with self-care, mobility, lifting and ambulation.      Home Exercise Program:    [x] (49599) Reviewed/Progressed HEP activities related to strengthening, flexibility, endurance, ROM of core, proximal hip and LE for functional self-care, mobility, lifting and ambulation/stair navigation   [] (87622) Reviewed/Progressed HEP activities related to improving balance, coordination, kinesthetic sense, posture, motor skill, proprioception of core, proximal hip and LE for self-care, mobility, lifting, and ambulation/stair navigation      Modalities:    [] Electric Stimulation:   [] Ultrasound:   [] Other:       Charges:  Timed Code Treatment Minutes: TE: 23, NMR: 15   Total Treatment Minutes: 38     [] RE-EVAL     [x] HN(56393) x  2     [x] NMR (99862) x  1    [] Manual (62122) x       [] TA (48646) x      [] Gait Training (40710) x       [] ES(attended) (86700)  [] ES (un) (00599)   [] DRY NEEDLE 1 OR 2 MUSCLES  [] DRY NEEDLE 3+ MUSCLES  [] Mech Traction (73497)  [] Ultrasound (13521)  [] Other:    GOALS: assessed 08/22/2022  Patient stated goal: \"I would just like for it to improve, get the steadiness back when I walk. \"  [x] Progressing: [] Met: [] Not Met: [] Adjusted     Therapist goals for Patient:  Short Term Goals: To be achieved in: 3 weeks  1. Independent in initial HEP per patient tolerance, in order to prevent re-injury. [] Progressing: [x] Met: [] Not Met: [] Adjusted  2. Patient will improve SLS to 3 seconds B for improved balance  [] Progressing: [] Met: [x] Not Met: [] Adjusted     Long Term Goals: To be achieved in: 6 weeks  1. Disability index score of 51 or better on FOTO Balance confidence to assist with reaching prior level of function. [x] Progressing: [] Met: [] Not Met: [] Adjusted  2. Patient will demonstrate increased SLS to 6 seconds B for rare feelings of unsteadiness with ambulating over uneven terrain   [] Progressing: [] Met: [x] Not Met: [] Adjusted  3. Patient will demonstrate an increase in Strength B LE 4+/5 to allow for prolonged standing  [x] Progressing: [] Met: [] Not Met: [] Adjusted  4. Pt will be able to ambulate with trace Trendelenburg gait pattern and rare sensations of unsteadiness during ambulation, especially when initiating ambulation. [x] Progressing: [] Met: [] Not Met: [] Adjusted  5. Independent in HEP progressions per patient tolerance, in order to prevent re-injury. [x] Progressing: [] Met: [] Not Met: [] Adjusted    ASSESSMENT:  Pt making steady progress towards goals, especially with improving strength B LE and improving FOTO balance confidence score, although still with balance deficits in SLS. Pt will benefit from additional therapy to address deficits to return to PLOF. Recommend continue 2x per week x 3 visits per initial POC.       Treatment/Activity Tolerance:  [x] Patient tolerated treatment well [] Patient limited by fatique  [] Patient limited by pain  [] Patient limited by other medical complications  [] Other:     Overall Progression Towards Functional goals/ Treatment Progress Update:  [x] Patient is progressing as expected towards functional goals listed. [] Progression is slowed due to complexities/Impairments listed. [] Progression has been slowed due to co-morbidities. [] Plan just implemented, too soon to assess goals progression <30days   [] Goals require adjustment due to lack of progress  [] Patient is not progressing as expected and requires additional follow up with physician  [] Other    Prognosis for POC: [x] Good [] Fair  [] Poor    Patient requires continued skilled intervention: [x] Yes  [] No        PLAN:   [x] Continue per plan of care [] Alter current plan (see comments)  [] Plan of care initiated [] Hold pending MD visit [] Discharge    Electronically signed by: Woody Guerra, PT, DPT [id number]    Note: If patient does not return for scheduled/recommended follow up visits, this note will serve as a discharge from care along with the most recent update on progress.

## 2022-08-24 ENCOUNTER — HOSPITAL ENCOUNTER (OUTPATIENT)
Dept: PHYSICAL THERAPY | Age: 82
Setting detail: THERAPIES SERIES
Discharge: HOME | End: 2022-08-24
Payer: MEDICARE

## 2022-08-24 PROCEDURE — 97112 NEUROMUSCULAR REEDUCATION: CPT

## 2022-08-24 PROCEDURE — 97110 THERAPEUTIC EXERCISES: CPT

## 2022-08-24 NOTE — FLOWSHEET NOTE
[facility] Outpatient [facility]  [address], [place], [address]  Phone: [phone]   Fax: [phone]    Physical Therapy Treatment Note/ Progress Report:     Date:  2022    Patient Name:  Charisse Ballesteros    :  1940  MRN: [id number]    Medical/Treatment Diagnosis Information:  Diagnosis: Unsteady gait (R26.81)  Treatment Diagnosis: generalized muscle weakness M62.81, gait abnormality I36.1  Insurance/Certification information:  PT Insurance Information: Medicare, Medical [place]  Physician Information:  Richard Miller MD  Plan of care signed:    [x] Yes  [] No    Date of Patient follow up with Physician: ?     Progress Report: []  Yes  [x]  No     Date Range for reporting period:  Beginnin2022  Ending:  NA    Progress report due (10 Rx/or 30 days whichever is less): 2901     Recertification due (POC duration/ or 90 days whichever is less):      Visit # Insurance Allowable Auth Needed   10/12 BMN []Yes   [x]No     RESTRICTIONS/PRECAUTIONS: HTN, osteopenia, OA, shortness of breath, neuropathy, meningioma excision T-spine  Latex Allergy:  [x]NO      []YES  Preferred Language for Healthcare:   [x]English       []other:  Functional Scale: FOTO balance confidence Date assessed:2022    Pain level:  0/10     SUBJECTIVE:   Pt notes no complaints after last session. OBJECTIVE:     Exercises/Interventions: Exercises in bold performed in department today. Items not bolded are carried forward from prior visits for continuity of the record.     Exercise/Equipment Resistance/Repetitions Other comments   Nustep, seat 7, no arms, Lv 4 4.5 mins Held further due to cramping L groin   Standing hip ext X 20 each B Fatigue, B UE A   Standing hip abd X 20 each B Fatigue, B UE A   Standing marches, on foam X 20 each B Fatigue, 1 UE A   Mini squats on foam X 20    Side step in // bars X 3 laps no UE A   L ankle EV Yellow, 2 x 10 Balance Feet together on foam, 3 x 15\", EC    Semi-Tandem on foam  2 x 20\" each, EO    Tandem stance, 2 x 10\" each way, EO    Feet together on firm, EC, 3 x 15\"    SLS, B 2 finger A, 2 x 10\" each    March walk x 3 laps in // bars, 1 UE A, 3 sec hold with march    Heel-toe walk x 3 laps in // bars, 1 UE A    Toe taps at bottom step  2 x 20 1 UE A    Carioca x 3 laps, B UE A             Left in front more challenging            R more challenging         Step up onto 6\" step  15 x R/L, 1 UE A                               Home Exercise Program:Pt. demonstrated good understanding and knowledge of HEP. Written instructions provided. 07/25/2022: none initiated today  07/27/2022: standing hip abd, ext, marching 1x per day, 1-2 sets of 10 reps  08/02/2022: balance feet together, modified tandem. Access Code [email or web address]  08/04/2022: progress from modified tandem to tandem as able  08/22/2022: Priscila Luis. Access Code [email or web address]  08/24/2022: ankle EV. Access Code [email or web address]    Therapeutic Exercise:   [x] (13171) Provided verbal/tactile cueing for activities related to strengthening, flexibility, endurance, ROM for improvements in LE, proximal hip, and core control with self-care, mobility, lifting, ambulation. NMR:  [x] (86735) Provided verbal/tactile cueing for activities related to improving balance, coordination, kinesthetic sense, posture, motor skill, proprioception to assist with LE, proximal hip, and core control in self-care, mobility, lifting, ambulation and eccentric single leg control. Therapeutic Activities:    [] (55478) Provided verbal/tactile cueing for activities related to improving balance, coordination, kinesthetic sense, posture, motor skill, proprioception and motor activation to allow for proper function of core, proximal hip and LE with self-care and ADLs and functional mobility.      Gait Training:    [] (32136) Provided training and instruction to the patient for proper LE, core and proximal hip recruitment and positioning and eccentric body weight control with ambulation re-education including up and down stairs     Manual Treatments:  PROM / STM / Oscillations-Mobs:  G-I, II, III, IV (PA's, Inf., Post.)  [] (59962) Provided manual therapy to mobilize LE, proximal hip and/or LS spine soft tissue/joints for the purpose of modulating pain, promoting relaxation,  increasing ROM, reducing/eliminating soft tissue swelling/inflammation/restriction, improving soft tissue extensibility and allowing for proper ROM for normal function with self-care, mobility, lifting and ambulation. Home Exercise Program:    [x] (29455) Reviewed/Progressed HEP activities related to strengthening, flexibility, endurance, ROM of core, proximal hip and LE for functional self-care, mobility, lifting and ambulation/stair navigation   [] (18019) Reviewed/Progressed HEP activities related to improving balance, coordination, kinesthetic sense, posture, motor skill, proprioception of core, proximal hip and LE for self-care, mobility, lifting, and ambulation/stair navigation      Modalities:    [] Electric Stimulation:   [] Ultrasound:   [] Other:       Charges:  Timed Code Treatment Minutes: TE: 15, NMR: 25   Total Treatment Minutes: 40     [] RE-EVAL     [x] MW(47957) x  1    [x] NMR (40921) x  2    [] Manual (64841) x       [] TA (59609) x      [] Gait Training (19651) x       [] ES(attended) (61982)  [] ES (un) (88659)   [] DRY NEEDLE 1 OR 2 MUSCLES  [] DRY NEEDLE 3+ MUSCLES  [] Mech Traction (99743)  [] Ultrasound (70197)  [] Other:    GOALS: assessed 08/22/2022  Patient stated goal: \"I would just like for it to improve, get the steadiness back when I walk. \"  [x] Progressing: [] Met: [] Not Met: [] Adjusted     Therapist goals for Patient:  Short Term Goals: To be achieved in: 3 weeks  1. Independent in initial HEP per patient tolerance, in order to prevent re-injury. [] Progressing: [x] Met: [] Not Met: [] Adjusted  2.  Patient requires continued skilled intervention: [x] Yes  [] No        PLAN:   [x] Continue per plan of care [] Alter current plan (see comments)  [] Plan of care initiated [] Hold pending MD visit [] Discharge    Electronically signed by: Lizabeth Chavarria, PT, DPT [id number]    Note: If patient does not return for scheduled/recommended follow up visits, this note will serve as a discharge from care along with the most recent update on progress.

## 2022-08-26 ENCOUNTER — TELEMEDICINE (OUTPATIENT)
Dept: ENDOCRINOLOGY | Age: 82
End: 2022-08-26
Payer: MEDICARE

## 2022-08-26 DIAGNOSIS — M85.80 OSTEOPENIA, UNSPECIFIED LOCATION: ICD-10-CM

## 2022-08-26 DIAGNOSIS — E83.52 HYPERCALCEMIA: Primary | ICD-10-CM

## 2022-08-26 PROCEDURE — G8399 PT W/DXA RESULTS DOCUMENT: HCPCS | Performed by: INTERNAL MEDICINE

## 2022-08-26 PROCEDURE — 99214 OFFICE O/P EST MOD 30 MIN: CPT | Performed by: INTERNAL MEDICINE

## 2022-08-26 PROCEDURE — 1090F PRES/ABSN URINE INCON ASSESS: CPT | Performed by: INTERNAL MEDICINE

## 2022-08-26 PROCEDURE — 1123F ACP DISCUSS/DSCN MKR DOCD: CPT | Performed by: INTERNAL MEDICINE

## 2022-08-26 PROCEDURE — G8427 DOCREV CUR MEDS BY ELIG CLIN: HCPCS | Performed by: INTERNAL MEDICINE

## 2022-08-26 NOTE — PROGRESS NOTES
Patient was evaluated through a synchronous (real-time) audio-video  encounter. The patient (or guardian if applicable) is aware that this is a billable service, which includes applicable co-pays. This Virtual Visit was  conducted with patient's (and/or legal guardian's) consent. The visit was conducted pursuant to the emergency declaration under the [facility], [facility] authority and the [employer] Act. Patient identification was verified,  and a caregiver was present when appropriate. The patient was located in a state where the provider was licensed to provide care. NARENDRA Alcantar is a 80 y.o. female who was seen for management of hypercalcemia. Interim:    No fractures, stable    Has seen Dr. Margie Davis    PCP :  Greg Fountain MD       Patient has a PMH of HTN, osteopenia, hypercalcemia. Patient was found to have high calcium in 06/18  Calcium was 10.7    Repeat calcium 10.8 in 06/19    Intact PTH level was 119      No History of kidney stones    She has h/o osteopenia. She is on alendronate 70 mg weekly since 2019. FH of hypercalcemia: No    Dietary ca intake : 600-900 mg daily. Supplemental calcium: She was on calcium 500 mg daily. Stopped in 06/19    Supplemental Vitamin D: 1000 IU daily. Mild, controlled, no worsening factors    Dexa 2017        L Spine (L 1-4): BMD= 0.916 g/cm2, T-score= -1.2, Z-score= 1.2,   %Change = -1.0%       L Hip: BMD= 0.734 g/cm2, T-score= -1.7, Z-score= 0.1, %Change =   -2.7%   L Femoral Neck: BMD= 0.597 g/cm2, T-score= -2.3, Z-score= -0.2           FRAX REPORT (WHO 10 Year Fracture Risk Assessment):         Hip Fracture Risk = 4.0%, Major Osteoporotic Fracture= 14%     Dexa 1/22  Lumbar Spine : BMD is 0.962 and T score is - 0.8 which is in the range of normal. This corresponds to 4% worsening since 2019.        Left femoral neck : BMD is 0.602 and T score is - 2.2 which is in the range of osteopenia. Left hip : BMD is 0.735 and T score is - 1.7 which is in the range of osteopenia. No significant change since 2019. Past Medical History:   Diagnosis Date    Allergic rhinitis 8/25/2016    Hypertension     Low back pain     Medial meniscus tear     Left knee    Meningioma ([address]     T11-12    Osteopenia      Past Surgical History:   Procedure Laterality Date    DILATION AND CURETTAGE OF UTERUS  1985    HYSTERECTOMY, VAGINAL  1985    LIPOMA RESECTION  1991    Thigh, buttock    SHOULDER ARTHROSCOPY  2000    Right subacromial decompression    THORACIC SPINE SURGERY      Meningioma excision T11-12     Current Outpatient Medications   Medication Sig Dispense Refill    alendronate (FOSAMAX) 70 MG tablet TAKE 1 TABLET EVERY 7 DAYS WITH WATER 30 MINUTES      BEFORE FIRST MEAL REMAIN   UPRIGHT FOR 30 MINUTES 12 tablet 1    losartan-hydroCHLOROthiazide (HYZAAR) 100-12.5 MG per tablet TAKE 1 TABLET DAILY 90 tablet 1    metoprolol succinate (TOPROL XL) 100 MG extended release tablet TAKE 1 TABLET DAILY 90 tablet 1    vitamin B-12 (CYANOCOBALAMIN) 100 MCG tablet Take 100 mcg by mouth daily       cetirizine (ZYRTEC ALLERGY) 10 MG tablet Take 1 tablet by mouth daily      triamcinolone (NASACORT AQ) 55 MCG/ACT nasal inhaler 2 sprays by Nasal route daily      Cholecalciferol (VITAMIN D) 1000 UNIT TABS Take 1,000 Units by mouth daily        No current facility-administered medications for this visit. ROS:   Constitutional: Negative for weight loss and malaise/fatigue. Negative for fever and chills. HENT: Negative for hearing loss, ear pain, nosebleeds, neck pain and tinnitus. Eyes: Negative for blurred vision. Negative for double vision, photophobia and pain. Respiratory: Negative for cough and sputum production. +SOB  Cardiovascular: Negative for chest pain, palpitations and leg swelling. Gastrointestinal: Negative for nausea, vomiting and abdominal pain. Genitourinary: Negative for dysuria, urgency and frequency. Musculoskeletal: Negative for back pain. No joint pain  Skin: Negative for itching and rash. Neurological: Negative for dizziness. Negative for tingling, tremors, focal weakness and headaches. Endo/Heme/Allergies: see HPI  Psychiatric/Behavioral: Negative for depression and substance abuse. PHYSICAL EXAMINATION:  [ INSTRUCTIONS:  \"[x]\" Indicates a positive item  \"[]\" Indicates a negative item  -- DELETE ALL ITEMS NOT EXAMINED]  Vital Signs: (As obtained by patient/caregiver or practitioner observation)    Blood pressure-  Heart rate-    Respiratory rate-    Temperature-  Pulse oximetry-     Constitutional Appears well-developed and well-nourished No apparent distress        Mental status  Alert and awake  Oriented to person/place/time  Able to follow commands      Eyes:  EOM    [x]  Normal    Sclera  [x]  Normal           Discharge [x]  None visible      HENT:   [x] Normocephalic, atraumatic.     [x] Mouth/Throat: Mucous membranes are moist.     External Ears [x] Normal  no discharge    Neck: [x] No visualized mass  no swelling    Pulmonary/Chest: [x] Respiratory effort normal.  [x] No visualized signs of difficulty breathing or respiratory distress             Musculoskeletal:   [x] Normal gait with no signs of ataxia         [x] Normal range of motion of neck          Head and neck stable, appears normal ROM, strength good    Neurological:        [x] No Facial Asymmetry (Cranial nerve 7 motor function) (limited exam to video visit)          [x] No gaze palsy                Skin:        [x] No significant exanthematous lesions or discoloration noted on facial skin                 Psychiatric:       [x] Normal Affect [x] No Hallucinations            Other pertinent observable physical exam findings-     Due to this being a TeleHealth encounter, evaluation of the following organ systems is limited: Vitals/Constitutional/EENT/Resp/CV/GI//MS/Neuro/Skin/Heme-Lymph-Imm. Services were provided through a video synchronous discussion virtually to substitute for in-person clinic visit. Lab Results   Component Value Date    CALCIUM 11.0 (H) 07/12/2022    CALCIUM 10.8 (H) 01/19/2022    CALCIUM 10.9 (H) 07/08/2021    CALCIUM 10.2 07/15/2020    CALCIUM 10.7 (H) 12/03/2019    CALCIUM 10.6 10/28/2019    CALCIUM 10.5 08/15/2019    CALCIUM 10.8 (H) 06/13/2019    CALCIUM 10.4 12/20/2018    CALCIUM 10.7 (H) 06/28/2018     Orders Only on 07/12/2022   Component Date Value Ref Range Status    Sodium 07/12/2022 140  136 - 145 mmol/L Final    Potassium 07/12/2022 4.1  3.5 - 5.1 mmol/L Final    Chloride 07/12/2022 102  99 - 110 mmol/L Final    CO2 07/12/2022 28  21 - 32 mmol/L Final    Anion Gap 07/12/2022 10  3 - 16 Final    Glucose, Fasting 07/12/2022 89  70 - 99 mg/dL Final    BUN 07/12/2022 18  7 - 20 mg/dL Final    Creatinine 07/12/2022 0.6  0.6 - 1.2 mg/dL Final    GFR Non- 07/12/2022 >60  >60 Final    Comment: >60 mL/min/1.73m2 EGFR, calc. for ages 25 and older using the  MDRD formula (not corrected for weight), is valid for stable  renal function. GFR  07/12/2022 >60  >60 Final    Comment: Chronic Kidney Disease: less than 60 ml/min/1.73 sq.m. Kidney Failure: less than 15 ml/min/1.73 sq.m. Results valid for patients 18 years and older.       Calcium 07/12/2022 11.0 (A) 8.3 - 10.6 mg/dL Final    Total Protein 07/12/2022 6.3 (A) 6.4 - 8.2 g/dL Final    Albumin 07/12/2022 4.3  3.4 - 5.0 g/dL Final    Albumin/Globulin Ratio 07/12/2022 2.2  1.1 - 2.2 Final    Total Bilirubin 07/12/2022 0.7  0.0 - 1.0 mg/dL Final    Alkaline Phosphatase 07/12/2022 81  40 - 129 U/L Final    ALT 07/12/2022 16  10 - 40 U/L Final    AST 07/12/2022 13 (A) 15 - 37 U/L Final    PTH 07/12/2022 174.5 (A) 14.0 - 72.0 pg/mL Final    Glucose 07/12/2022 89  70 - 99 mg/dL Final Assessment/Plan      1. Hypercalcemia     Leidy Obando is a 80 y.o. female was found to have a high calcium  .2  Most likely diagnosis is Primary hyperparathyroidism. She is on HCTZ 12.5 mg daily which can affect calcium levels. Repeat calcium level high with intact PTH. Phos 2.9   Vit D ( 25 hydroxy) 33.5      24 hr urine calcium is 206 which rules out [address]. No surgical indication at this point    Calcium 10.9 in 12/19  Ca 10.9 on 7/21  Ca 11.0   on 7/22    Will monitor calcium for now and check it every 6 months     Advised to keep herself hydrated. Will repeat calcium when she sees PCP. 2. Osteopenia. She is on alendronate 70 mg weekly  On it since 12/17    DEXA scan in 12/19 showed   Osteopenia of total hip with T score -1.8, Z score 0.1 and BMD 0.719. Osteopenia of femoral neck with T score -2.4, Z score -0.2 and BMD 0.582       Comparison with 2017 shows 10.0% increase in bone mineral density of spine and 2.0% decrease in bone mineral density of hip        Repeat DEXA scan in 1/22 shows osteopenia, 4 % decline at spine and stable at hip. Fosamax was continued   May consider drug holiday after next scan    3.  HTN   Managed by PCP

## 2022-08-29 ENCOUNTER — HOSPITAL ENCOUNTER (OUTPATIENT)
Dept: PHYSICAL THERAPY | Age: 82
Setting detail: THERAPIES SERIES
Discharge: HOME | End: 2022-08-29
Payer: MEDICARE

## 2022-08-29 PROCEDURE — 97112 NEUROMUSCULAR REEDUCATION: CPT

## 2022-08-29 PROCEDURE — 97110 THERAPEUTIC EXERCISES: CPT

## 2022-08-29 NOTE — FLOWSHEET NOTE
[facility] Outpatient [facility]  [address] [place] [address], [address], [address]  Phone: [phone]   Fax: [phone]    Physical Therapy Treatment Note/ Progress Report:     Date:  2022    Patient Name:  Shannan Downs    :  1940  MRN: [id number]    Medical/Treatment Diagnosis Information:  Diagnosis: Unsteady gait (R26.81)  Treatment Diagnosis: generalized muscle weakness M62.81, gait abnormality V89.2  Insurance/Certification information:  PT Insurance Information: Medicare, Medical [place]  Physician Information:  Trip Dia MD  Plan of care signed:    [x] Yes  [] No    Date of Patient follow up with Physician: ?     Progress Report: []  Yes  [x]  No     Date Range for reporting period:  Beginnin2022  Ending:  NA    Progress report due (10 Rx/or 30 days whichever is less):      Recertification due (POC duration/ or 90 days whichever is less):      Visit # Insurance Allowable Auth Needed    BMN []Yes   [x]No     RESTRICTIONS/PRECAUTIONS: HTN, osteopenia, OA, shortness of breath, neuropathy, meningioma excision T-spine  Latex Allergy:  [x]NO      []YES  Preferred Language for Healthcare:   [x]English       []other:  Functional Scale: FOTO balance confidence Date assessed:2022    Pain level:  0/10     SUBJECTIVE:   Pt reports new t-band exercise added last visit is going well. Is just feeling tired today. OBJECTIVE:     Exercises/Interventions: Exercises in bold performed in department today. Items not bolded are carried forward from prior visits for continuity of the record.     Exercise/Equipment Resistance/Repetitions Other comments   Nustep, seat 7, no arms, Lv 4 6 mins    Standing hip ext X 20 each B Fatigue, B UE A   Standing hip abd X 20 each B Fatigue, B UE A   Standing marches, on foam X 20 each B Fatigue, 1 UE A   Mini squats on foam X 20    Side step in // bars X 3 laps no UE A   L ankle EV Yellow, 2 x 10 review instruction to the patient for proper LE, core and proximal hip recruitment and positioning and eccentric body weight control with ambulation re-education including up and down stairs     Manual Treatments:  PROM / STM / Oscillations-Mobs:  G-I, II, III, IV (PA's, Inf., Post.)  [] (08588) Provided manual therapy to mobilize LE, proximal hip and/or LS spine soft tissue/joints for the purpose of modulating pain, promoting relaxation,  increasing ROM, reducing/eliminating soft tissue swelling/inflammation/restriction, improving soft tissue extensibility and allowing for proper ROM for normal function with self-care, mobility, lifting and ambulation. Home Exercise Program:    [x] (58876) Reviewed/Progressed HEP activities related to strengthening, flexibility, endurance, ROM of core, proximal hip and LE for functional self-care, mobility, lifting and ambulation/stair navigation   [] (98714) Reviewed/Progressed HEP activities related to improving balance, coordination, kinesthetic sense, posture, motor skill, proprioception of core, proximal hip and LE for self-care, mobility, lifting, and ambulation/stair navigation      Modalities:    [] Electric Stimulation:   [] Ultrasound:   [] Other:       Charges:  Timed Code Treatment Minutes: TE: 15, NMR: 25   Total Treatment Minutes: 40     [] RE-EVAL     [x] TN(43277) x  1    [x] NMR (98755) x  2    [] Manual (98294) x       [] TA (37133) x      [] Gait Training (35460) x       [] ES(attended) (43037)  [] ES (un) (81982)   [] DRY NEEDLE 1 OR 2 MUSCLES  [] DRY NEEDLE 3+ MUSCLES  [] Mech Traction (64621)  [] Ultrasound (75637)  [] Other:    GOALS: assessed 08/22/2022  Patient stated goal: \"I would just like for it to improve, get the steadiness back when I walk. \"  [x] Progressing: [] Met: [] Not Met: [] Adjusted     Therapist goals for Patient:  Short Term Goals: To be achieved in: 3 weeks  1.  Independent in initial HEP per patient tolerance, in order to prevent re-injury. [] Progressing: [x] Met: [] Not Met: [] Adjusted  2. Patient will improve SLS to 3 seconds B for improved balance  [] Progressing: [] Met: [x] Not Met: [] Adjusted     Long Term Goals: To be achieved in: 6 weeks  1. Disability index score of 51 or better on FOTO Balance confidence to assist with reaching prior level of function. [x] Progressing: [] Met: [] Not Met: [] Adjusted  2. Patient will demonstrate increased SLS to 6 seconds B for rare feelings of unsteadiness with ambulating over uneven terrain   [] Progressing: [] Met: [x] Not Met: [] Adjusted  3. Patient will demonstrate an increase in Strength B LE 4+/5 to allow for prolonged standing  [x] Progressing: [] Met: [] Not Met: [] Adjusted  4. Pt will be able to ambulate with trace Trendelenburg gait pattern and rare sensations of unsteadiness during ambulation, especially when initiating ambulation. [x] Progressing: [] Met: [] Not Met: [] Adjusted  5. Independent in HEP progressions per patient tolerance, in order to prevent re-injury. [x] Progressing: [] Met: [] Not Met: [] Adjusted    ASSESSMENT:  Good tolerance to exercises today, just fatigue noted. Pt will benefit from additional therapy to address deficits to return to PLOF. Treatment/Activity Tolerance:  [x] Patient tolerated treatment well [] Patient limited by fatique  [] Patient limited by pain  [] Patient limited by other medical complications  [] Other:     Overall Progression Towards Functional goals/ Treatment Progress Update:  [x] Patient is progressing as expected towards functional goals listed. [] Progression is slowed due to complexities/Impairments listed. [] Progression has been slowed due to co-morbidities.   [] Plan just implemented, too soon to assess goals progression <30days   [] Goals require adjustment due to lack of progress  [] Patient is not progressing as expected and requires additional follow up with physician  [] Other    Prognosis for POC: [x] Good [] Fair  [] Poor    Patient requires continued skilled intervention: [x] Yes  [] No        PLAN:   [x] Continue per plan of care [] Alter current plan (see comments)  [] Plan of care initiated [] Hold pending MD visit [] Discharge    Electronically signed by: Jian Rai PT, DPT [id number]    Note: If patient does not return for scheduled/recommended follow up visits, this note will serve as a discharge from care along with the most recent update on progress.

## 2022-08-31 ENCOUNTER — HOSPITAL ENCOUNTER (OUTPATIENT)
Dept: PHYSICAL THERAPY | Age: 82
Setting detail: THERAPIES SERIES
Discharge: HOME | End: 2022-08-31
Payer: MEDICARE

## 2022-08-31 PROCEDURE — 97110 THERAPEUTIC EXERCISES: CPT

## 2022-08-31 PROCEDURE — 97112 NEUROMUSCULAR REEDUCATION: CPT

## 2022-08-31 NOTE — PROGRESS NOTES
[facility] Outpatient [facility]  [address] [place] [address], [address] [place] [address], [address] [place] [address]  Phone: [phone]   Fax: [phone]    Physical Therapy Treatment Note/ Progress Report/Discharge Summary:     Date:  2022    Patient Name:  Colette Fishman    :  1940  MRN: [id number]    Medical/Treatment Diagnosis Information:  Diagnosis: Unsteady gait (R26.81)  Treatment Diagnosis: generalized muscle weakness M62.81, gait abnormality J52.0  Insurance/Certification information:  PT Insurance Information: Medicare, Medical [place]  Physician Information:  Star Hyde MD  Plan of care signed:    [x] Yes  [] No    Date of Patient follow up with Physician: ?     Progress Report: [x]  Yes  []  No     Date Range for reporting period:  Beginnin2022  Endin2022  12 visits attended, 0 cancels, 0 no shows    Progress report due (10 Rx/or 30 days whichever is less):      Recertification due (POC duration/ or 90 days whichever is less):      Visit # Insurance Allowable Auth Needed    BMN []Yes   [x]No     RESTRICTIONS/PRECAUTIONS: HTN, osteopenia, OA, shortness of breath, neuropathy, meningioma excision T-spine  Latex Allergy:  [x]NO      []YES  Preferred Language for Healthcare:   [x]English       []other:  Functional Scale: FOTO balance confidence Date assessed:2022    Pain level:  0/10     SUBJECTIVE:  Pt notes she feels improved with walking as is not reaching for something to hang onto as much. OBJECTIVE: FOTO balance confidence = 52. Strength B hip flexion 4+/5, B hip abduction 5/5 in sitting, L knee flex/ext 5/5, L ankle EV 4+/5. SLS L 2 seconds, R unable. Exercises/Interventions: Exercises in bold performed in department today. Items not bolded are carried forward from prior visits for continuity of the record.     Exercise/Equipment Resistance/Repetitions Other comments   Nustep, seat 7, no arms, Lv 4 6 mins    Standing hip ext X 20 each B Fatigue, B UE A   Standing hip abd X 20 each B Fatigue, B UE A   Standing marches, on foam X 20 each B Fatigue, 1 UE A   Mini squats on foam X 20    Side step in // bars X 3 laps no UE A   L ankle EV Yellow, x 15 Cues for not moving leg                       Balance Feet together on foam, 3 x 15\", EC    Semi-Tandem on foam  2 x 20\" each, EO    Tandem stance, 2 x 15\" each way, EO    Feet together on firm, EC, 3 x 15\"    SLS, B 2 finger A, 2 x 10\" each    March walk x 3 laps in // bars, 1 UE A, 3 sec hold with march    Heel-toe walk x 3 laps in // bars, 1 UE A    Toe taps at bottom step  2 x 20 1 UE A    Carioca x 3 laps, 1 UE A             Left in front more challenging            R more challenging         Step up onto 6\" step  15 x R/L, 1 UE A                               Home Exercise Program:Pt. demonstrated good understanding and knowledge of HEP. Written instructions provided. 07/25/2022: none initiated today  07/27/2022: standing hip abd, ext, marching 1x per day, 1-2 sets of 10 reps  08/02/2022: balance feet together, modified tandem. Access Code [email or web address]  08/04/2022: progress from modified tandem to tandem as able  08/22/2022: Priscila Luis. Access Code [email or web address]  08/24/2022: ankle EV. Access Code [facility]  08/29/2022: march walking. Access Code [facility]    Therapeutic Exercise:   [x] (71066) Provided verbal/tactile cueing for activities related to strengthening, flexibility, endurance, ROM for improvements in LE, proximal hip, and core control with self-care, mobility, lifting, ambulation. NMR:  [x] (91511) Provided verbal/tactile cueing for activities related to improving balance, coordination, kinesthetic sense, posture, motor skill, proprioception to assist with LE, proximal hip, and core control in self-care, mobility, lifting, ambulation and eccentric single leg control.      Therapeutic Activities:    [] (02878) Provided verbal/tactile cueing for activities related to improving balance, coordination, kinesthetic sense, posture, motor skill, proprioception and motor activation to allow for proper function of core, proximal hip and LE with self-care and ADLs and functional mobility. Gait Training:    [] (74297) Provided training and instruction to the patient for proper LE, core and proximal hip recruitment and positioning and eccentric body weight control with ambulation re-education including up and down stairs     Manual Treatments:  PROM / STM / Oscillations-Mobs:  G-I, II, III, IV (PA's, Inf., Post.)  [] (44344) Provided manual therapy to mobilize LE, proximal hip and/or LS spine soft tissue/joints for the purpose of modulating pain, promoting relaxation,  increasing ROM, reducing/eliminating soft tissue swelling/inflammation/restriction, improving soft tissue extensibility and allowing for proper ROM for normal function with self-care, mobility, lifting and ambulation.      Home Exercise Program:    [x] (39712) Reviewed/Progressed HEP activities related to strengthening, flexibility, endurance, ROM of core, proximal hip and LE for functional self-care, mobility, lifting and ambulation/stair navigation   [] (11587) Reviewed/Progressed HEP activities related to improving balance, coordination, kinesthetic sense, posture, motor skill, proprioception of core, proximal hip and LE for self-care, mobility, lifting, and ambulation/stair navigation      Modalities:    [] Electric Stimulation:   [] Ultrasound:   [] Other:       Charges:  Timed Code Treatment Minutes: TE: 15, NMR: 25   Total Treatment Minutes: 40     [] RE-EVAL     [x] XH(11579) x  1    [x] NMR (90452) x  2    [] Manual (80481) x       [] TA (67338) x      [] Gait Training (79793) x       [] ES(attended) (80358)  [] ES (un) (48959)   [] DRY NEEDLE 1 OR 2 MUSCLES  [] DRY NEEDLE 3+ MUSCLES  [] [facility] T[facility] (13475)  [] Ultrasound (98329)  [] Other:    GOALS: assessed 08/31/2022  Patient stated goal: \"I would just like for it to improve, get the steadiness back when I walk. \"  [] Progressing: [x] Nearly Met: [] Not Met: [] Adjusted     Therapist goals for Patient:  Short Term Goals: To be achieved in: 3 weeks  1. Independent in initial HEP per patient tolerance, in order to prevent re-injury. [] Progressing: [x] Met: [] Not Met: [] Adjusted  2. Patient will improve SLS to 3 seconds B for improved balance  [x] Progressing: L [] Met: [x] Not Met: R [] Adjusted     Long Term Goals: To be achieved in: 6 weeks  1. Disability index score of 51 or better on FOTO Balance confidence to assist with reaching prior level of function. [] Progressing: [x] Met: [] Not Met: [] Adjusted  2. Patient will demonstrate increased SLS to 6 seconds B for rare feelings of unsteadiness with ambulating over uneven terrain   [x] Progressing: L [] Met: [x] Not Met: R [] Adjusted  3. Patient will demonstrate an increase in Strength B LE 4+/5 to allow for prolonged standing  [] Progressing: [x] Met: [] Not Met: [] Adjusted  4. Pt will be able to ambulate with trace Trendelenburg gait pattern and rare sensations of unsteadiness during ambulation, especially when initiating ambulation. [] Progressing: [x] Met: [] Not Met: [] Adjusted  5. Independent in HEP progressions per patient tolerance, in order to prevent re-injury. [] Progressing: [x] Met: [] Not Met: [] Adjusted    ASSESSMENT:  Pt has made good progress towards goals and feels is ready for D/C PT. Recommend pt continue with HEP as instructed and F/U with Dr as needed. Treatment/Activity Tolerance:  [x] Patient tolerated treatment well [] Patient limited by fatique  [] Patient limited by pain  [] Patient limited by other medical complications  [] Other:     Overall Progression Towards Functional goals/ Treatment Progress Update:  [x] Patient is progressing as expected towards functional goals listed. [] Progression is slowed due to complexities/Impairments listed.   [] Progression has been slowed due to co-morbidities. [] Plan just implemented, too soon to assess goals progression <30days   [] Goals require adjustment due to lack of progress  [] Patient is not progressing as expected and requires additional follow up with physician  [] Other    Prognosis for POC: [x] Good [] Fair  [] Poor    Patient requires continued skilled intervention: [] Yes  [x] No        PLAN:   [] Continue per plan of care [] Alter current plan (see comments)  [] Plan of care initiated [] Hold pending MD visit [x] Discharge    Electronically signed by: Lizabeth Chavarria, PT, DPT [id number]    Note: If patient does not return for scheduled/recommended follow up visits, this note will serve as a discharge from care along with the most recent update on progress.

## 2022-10-06 DIAGNOSIS — I10 ESSENTIAL HYPERTENSION: ICD-10-CM

## 2022-10-06 RX ORDER — METOPROLOL SUCCINATE 100 MG/1
TABLET, EXTENDED RELEASE ORAL
Qty: 90 TABLET | Refills: 1 | Status: SHIPPED | OUTPATIENT
Start: 2022-10-06

## 2022-10-06 RX ORDER — LOSARTAN POTASSIUM AND HYDROCHLOROTHIAZIDE 12.5; 1 MG/1; MG/1
TABLET ORAL
Qty: 90 TABLET | Refills: 1 | Status: SHIPPED | OUTPATIENT
Start: 2022-10-06

## 2022-12-14 RX ORDER — ALENDRONATE SODIUM 70 MG/1
TABLET ORAL
Qty: 12 TABLET | Refills: 1 | Status: SHIPPED | OUTPATIENT
Start: 2022-12-14

## 2023-01-04 NOTE — PROGRESS NOTES
Date of Visit:  1/10/2023    CC: Susi Rossi (: 1940) is a [de-identified] y.o. female, established patient, here for evaluation/re-evaluation of the following medical concerns:    ASSESSMENT/PLAN:  Essential hypertension  Assessment & Plan:  Well-controlled. Continue current antihypertensive regimen. B12 deficiency  Assessment & Plan:  Asymptomatic. Recheck level on lower dose of supplement. Orders:  -     Vitamin B12; Future  Hyperparathyroidism ([address]  Assessment & Plan:  Asymptomatic. She will keep appointment with endocrinology 3/15/23, as scheduled. Osteopenia of both thighs  Assessment & Plan:  Last dexa 22 showed slight decrease at spine, unchanged at hip on weekly alendronate. Continue vitamin D supplement at current dose and weight-bearing exercise. Avoiding calcium supplements due to hypercalcemia. Unsteady gait  Assessment & Plan:  Improved with PT- continue home exercises. Return in about 6 months (around 7/10/2023) for MEDICARE AWV. Vitals:    01/10/23 0823   BP: 128/78   Site: Right Upper Arm   Position: Sitting   Cuff Size: Medium Adult   Pulse: 59   SpO2: 99%   Weight: 167 lb (75.8 kg)   Height: 5' 3.5\" (1.613 m)      Estimated body mass index is 29.12 kg/m² as calculated from the following:    Height as of this encounter: 5' 3.5\" (1.613 m). Weight as of this encounter: 167 lb (75.8 kg). Wt Readings from Last 3 Encounters:   01/10/23 167 lb (75.8 kg)   22 164 lb 3.2 oz (74.5 kg)   22 168 lb (76.2 kg)     BP Readings from Last 3 Encounters:   01/10/23 128/78   22 132/78   22 (!) 155/72     HPI  Hypertension:  Home blood pressure monitorins/low 80s. She is adherent to a low sodium diet. Patient denies chest pain, shortness of breath, dizziness and palpitations. Antihypertensive medication side effects: none. Use of agents associated with hypertension: NSAIDS- intermittent ibuprofen. Weight stable.     Vitamin B12 deficiency:   Taking supplement consistently- cut back to 100 mcg/day. Energy level good, no paresthesias . Last level 710- 1/19/22. Hyperparathyroidism/osteopenia:  Last endocrinology appointment 1/11/22- dexa 1/6/22 showed slight decrease in bone density at the spine with no significant change at the hip. Tolerating Fosamax. Last calcium 10.8, .5- 7/22. No surgical indication per Dr. Jerrica Monson- next appointment 3/15/23. ROS  As documented above    Physical Exam  Constitutional:       General: She is not in acute distress. Appearance: She is well-developed. HENT:      Right Ear: Tympanic membrane, ear canal and external ear normal.      Left Ear: Tympanic membrane, ear canal and external ear normal.      Mouth/Throat:      Pharynx: No oropharyngeal exudate or posterior oropharyngeal erythema. Eyes:      Conjunctiva/sclera: Conjunctivae normal.   Cardiovascular:      Rate and Rhythm: Normal rate and regular rhythm. Heart sounds: Murmur heard. Systolic (RUSB) murmur is present with a grade of 2/6. No friction rub. No gallop. Pulmonary:      Effort: Pulmonary effort is normal. No respiratory distress. Breath sounds: Normal breath sounds. No wheezing, rhonchi or rales. Musculoskeletal:      Right lower leg: Edema (trace) present. Left lower leg: Edema (edema) present. Lymphadenopathy:      Cervical: No cervical adenopathy. Skin:     General: Skin is warm and dry. Neurological:      Mental Status: She is alert and oriented to person, place, and time. Gait: Gait normal.   Psychiatric:         Speech: Speech normal.         Behavior: Behavior normal.         Thought Content: Thought content normal.         Judgment: Judgment normal.          --Steffi Wyatt MD on 1/10/2023 at 8:46 AM    An electronic signature was used to authenticate this note.

## 2023-01-10 ENCOUNTER — OFFICE VISIT (OUTPATIENT)
Dept: INTERNAL MEDICINE CLINIC | Age: 83
End: 2023-01-10
Payer: MEDICARE

## 2023-01-10 VITALS
SYSTOLIC BLOOD PRESSURE: 128 MMHG | BODY MASS INDEX: 28.51 KG/M2 | DIASTOLIC BLOOD PRESSURE: 78 MMHG | OXYGEN SATURATION: 99 % | WEIGHT: 167 LBS | HEIGHT: 64 IN | HEART RATE: 59 BPM

## 2023-01-10 DIAGNOSIS — R26.81 UNSTEADY GAIT: ICD-10-CM

## 2023-01-10 DIAGNOSIS — E53.8 B12 DEFICIENCY: ICD-10-CM

## 2023-01-10 DIAGNOSIS — I10 ESSENTIAL HYPERTENSION: Primary | ICD-10-CM

## 2023-01-10 DIAGNOSIS — M85.852 OSTEOPENIA OF BOTH THIGHS: ICD-10-CM

## 2023-01-10 DIAGNOSIS — E21.3 HYPERPARATHYROIDISM (HCC): ICD-10-CM

## 2023-01-10 DIAGNOSIS — M85.851 OSTEOPENIA OF BOTH THIGHS: ICD-10-CM

## 2023-01-10 PROBLEM — R13.12 OROPHARYNGEAL DYSPHAGIA: Status: RESOLVED | Noted: 2022-07-12 | Resolved: 2023-01-10

## 2023-01-10 PROCEDURE — 1036F TOBACCO NON-USER: CPT | Performed by: INTERNAL MEDICINE

## 2023-01-10 PROCEDURE — G8399 PT W/DXA RESULTS DOCUMENT: HCPCS | Performed by: INTERNAL MEDICINE

## 2023-01-10 PROCEDURE — G8484 FLU IMMUNIZE NO ADMIN: HCPCS | Performed by: INTERNAL MEDICINE

## 2023-01-10 PROCEDURE — 3074F SYST BP LT 130 MM HG: CPT | Performed by: INTERNAL MEDICINE

## 2023-01-10 PROCEDURE — G8427 DOCREV CUR MEDS BY ELIG CLIN: HCPCS | Performed by: INTERNAL MEDICINE

## 2023-01-10 PROCEDURE — 1090F PRES/ABSN URINE INCON ASSESS: CPT | Performed by: INTERNAL MEDICINE

## 2023-01-10 PROCEDURE — G8417 CALC BMI ABV UP PARAM F/U: HCPCS | Performed by: INTERNAL MEDICINE

## 2023-01-10 PROCEDURE — 99214 OFFICE O/P EST MOD 30 MIN: CPT | Performed by: INTERNAL MEDICINE

## 2023-01-10 PROCEDURE — 3078F DIAST BP <80 MM HG: CPT | Performed by: INTERNAL MEDICINE

## 2023-01-10 PROCEDURE — 1123F ACP DISCUSS/DSCN MKR DOCD: CPT | Performed by: INTERNAL MEDICINE

## 2023-01-10 RX ORDER — AZELASTINE 1 MG/ML
2 SPRAY, METERED NASAL 2 TIMES DAILY
Qty: 60 ML | Refills: 5 | Status: SHIPPED | OUTPATIENT
Start: 2023-01-10

## 2023-01-10 ASSESSMENT — PATIENT HEALTH QUESTIONNAIRE - PHQ9
SUM OF ALL RESPONSES TO PHQ QUESTIONS 1-9: 0
SUM OF ALL RESPONSES TO PHQ9 QUESTIONS 1 & 2: 0
1. LITTLE INTEREST OR PLEASURE IN DOING THINGS: 0
SUM OF ALL RESPONSES TO PHQ QUESTIONS 1-9: 0
2. FEELING DOWN, DEPRESSED OR HOPELESS: 0

## 2023-01-10 NOTE — ASSESSMENT & PLAN NOTE
Last dexa 1/6/22 showed slight decrease at spine, unchanged at hip on weekly alendronate. Continue vitamin D supplement at current dose and weight-bearing exercise. Avoiding calcium supplements due to hypercalcemia.

## 2023-03-07 DIAGNOSIS — E53.8 B12 DEFICIENCY: ICD-10-CM

## 2023-03-07 DIAGNOSIS — E83.52 HYPERCALCEMIA: ICD-10-CM

## 2023-03-07 DIAGNOSIS — M85.80 OSTEOPENIA, UNSPECIFIED LOCATION: ICD-10-CM

## 2023-03-07 LAB
ANION GAP SERPL CALCULATED.3IONS-SCNC: 9 MMOL/L (ref 3–16)
BUN BLDV-MCNC: 13 MG/DL (ref 7–20)
CALCIUM SERPL-MCNC: 11.1 MG/DL (ref 8.3–10.6)
CHLORIDE BLD-SCNC: 105 MMOL/L (ref 99–110)
CO2: 29 MMOL/L (ref 21–32)
CREAT SERPL-MCNC: 0.6 MG/DL (ref 0.6–1.2)
GFR SERPL CREATININE-BSD FRML MDRD: >60 ML/MIN/{1.73_M2}
GLUCOSE BLD-MCNC: 88 MG/DL (ref 70–99)
PARATHYROID HORMONE INTACT: 181.9 PG/ML (ref 14–72)
POTASSIUM SERPL-SCNC: 4 MMOL/L (ref 3.5–5.1)
SODIUM BLD-SCNC: 143 MMOL/L (ref 136–145)
VITAMIN B-12: 543 PG/ML (ref 211–911)

## 2023-03-15 ENCOUNTER — OFFICE VISIT (OUTPATIENT)
Dept: ENDOCRINOLOGY | Age: 83
End: 2023-03-15
Payer: MEDICARE

## 2023-03-15 VITALS
OXYGEN SATURATION: 98 % | DIASTOLIC BLOOD PRESSURE: 76 MMHG | TEMPERATURE: 98 F | BODY MASS INDEX: 28.99 KG/M2 | HEIGHT: 64 IN | WEIGHT: 169.8 LBS | RESPIRATION RATE: 14 BRPM | HEART RATE: 56 BPM | SYSTOLIC BLOOD PRESSURE: 134 MMHG

## 2023-03-15 DIAGNOSIS — M94.9 DISORDER OF BONE AND CARTILAGE: ICD-10-CM

## 2023-03-15 DIAGNOSIS — M85.852 OSTEOPENIA OF NECK OF LEFT FEMUR: Primary | ICD-10-CM

## 2023-03-15 DIAGNOSIS — M89.9 DISORDER OF BONE AND CARTILAGE: ICD-10-CM

## 2023-03-15 DIAGNOSIS — E21.3 HYPERPARATHYROIDISM (HCC): ICD-10-CM

## 2023-03-15 PROCEDURE — 3078F DIAST BP <80 MM HG: CPT | Performed by: INTERNAL MEDICINE

## 2023-03-15 PROCEDURE — G8427 DOCREV CUR MEDS BY ELIG CLIN: HCPCS | Performed by: INTERNAL MEDICINE

## 2023-03-15 PROCEDURE — 99214 OFFICE O/P EST MOD 30 MIN: CPT | Performed by: INTERNAL MEDICINE

## 2023-03-15 PROCEDURE — 1090F PRES/ABSN URINE INCON ASSESS: CPT | Performed by: INTERNAL MEDICINE

## 2023-03-15 PROCEDURE — 1123F ACP DISCUSS/DSCN MKR DOCD: CPT | Performed by: INTERNAL MEDICINE

## 2023-03-15 PROCEDURE — G8417 CALC BMI ABV UP PARAM F/U: HCPCS | Performed by: INTERNAL MEDICINE

## 2023-03-15 PROCEDURE — G8399 PT W/DXA RESULTS DOCUMENT: HCPCS | Performed by: INTERNAL MEDICINE

## 2023-03-15 PROCEDURE — 3075F SYST BP GE 130 - 139MM HG: CPT | Performed by: INTERNAL MEDICINE

## 2023-03-15 PROCEDURE — G8484 FLU IMMUNIZE NO ADMIN: HCPCS | Performed by: INTERNAL MEDICINE

## 2023-03-15 PROCEDURE — 1036F TOBACCO NON-USER: CPT | Performed by: INTERNAL MEDICINE

## 2023-03-15 NOTE — PROGRESS NOTES
HPI    Susi Rossi is a [de-identified] y.o. female who was seen for management of hypercalcemia. Interim:    No fractures, stable  Taking fosamax    Has seen Dr. Serenity Roberts    PCP :  Alex Mathew MD       Patient has a PMH of HTN, osteopenia, hypercalcemia. Patient was found to have high calcium in 06/18  Calcium was 10.7    Repeat calcium 10.8 in 06/19    Intact PTH level was 119      No History of kidney stones    She has h/o osteopenia. She is on alendronate 70 mg weekly since 2019. FH of hypercalcemia: No    Dietary ca intake : 600-900 mg daily. Supplemental calcium: She was on calcium 500 mg daily. Stopped in 06/19    Supplemental Vitamin D: 1000 IU daily. Mild, controlled, no worsening factors    Dexa 2017        L Spine (L 1-4): BMD= 0.916 g/cm2, T-score= -1.2, Z-score= 1.2,   %Change = -1.0%       L Hip: BMD= 0.734 g/cm2, T-score= -1.7, Z-score= 0.1, %Change =   -2.7%   L Femoral Neck: BMD= 0.597 g/cm2, T-score= -2.3, Z-score= -0.2           FRAX REPORT (WHO 10 Year Fracture Risk Assessment):         Hip Fracture Risk = 4.0%, Major Osteoporotic Fracture= 14%     Dexa 1/22  Lumbar Spine : BMD is 0.962 and T score is - 0.8 which is in the range of normal. This corresponds to 4% worsening since 2019. Left femoral neck : BMD is 0.602 and T score is - 2.2 which is in the range of osteopenia. Left hip : BMD is 0.735 and T score is - 1.7 which is in the range of osteopenia. No significant change since 2019.            Past Medical History:   Diagnosis Date    Allergic rhinitis 8/25/2016    Hypertension     Low back pain     Medial meniscus tear     Left knee    Meningioma ([facility] [address]     T11-12    Osteopenia      Past Surgical History:   Procedure Laterality Date    DILATION AND CURETTAGE OF UTERUS  1985    HYSTERECTOMY, VAGINAL  1985    LIPOMA RESECTION  1991    Thigh, buttock    SHOULDER ARTHROSCOPY  2000    Right subacromial decompression    THORACIC SPINE SURGERY      Meningioma excision T11-12     Current Outpatient Medications   Medication Sig Dispense Refill    alendronate (FOSAMAX) 70 MG tablet TAKE 1 TABLET EVERY 7 DAYS WITH WATER 30 MINUTES      BEFORE FIRST MEAL REMAIN   UPRIGHT FOR 30 MINUTES 12 tablet 1    losartan-hydroCHLOROthiazide (HYZAAR) 100-12.5 MG per tablet TAKE 1 TABLET DAILY 90 tablet 1    metoprolol succinate (TOPROL XL) 100 MG extended release tablet TAKE 1 TABLET DAILY 90 tablet 1    vitamin B-12 (CYANOCOBALAMIN) 100 MCG tablet Take 100 mcg by mouth daily       cetirizine (ZYRTEC ALLERGY) 10 MG tablet Take 1 tablet by mouth daily      triamcinolone (NASACORT AQ) 55 MCG/ACT nasal inhaler 2 sprays by Nasal route daily      Cholecalciferol (VITAMIN D) 1000 UNIT TABS Take 1,000 Units by mouth daily       azelastine (ASTELIN) 0.1 % nasal spray 2 sprays by Nasal route 2 times daily Use in each nostril as directed (Patient not taking: Reported on 3/15/2023) 60 mL 5     No current facility-administered medications for this visit. ROS:     Scanned, reviewed    Vitals:    03/15/23 1107   BP: 134/76   Pulse: 56   Resp: 14   Temp: 98 °F (36.7 °C)   SpO2: 98%     Constitutional: Well-developed, appears stated age, cooperative, in no acute distress  H/E/N/M/T:atraumatic, normocephalic, external ears, nose, lips normal without lesions  Eyes: Lids, lashes, conjunctivae and sclerae normal, No proptosis, no redness  Neck: supple, symmetrical, no swelling  Skin: No obvious rashes or lesions present.   Skin and hair texture normal  Psychiatric: Judgement and Insight:  judgement and insight appear normal  Neuro: Normal without focal findings, speech is normal normal, speech is spontaneous  Chest: No labored breathing, no chest deformity, no stridor  Musculoskeletal: No joint deformity, swelling          Lab Results   Component Value Date    CALCIUM 11.1 (H) 03/07/2023    CALCIUM 11.0 (H) 07/12/2022    CALCIUM 10.8 (H) 01/19/2022    CALCIUM 10.9 (H) 07/08/2021    CALCIUM 10.2 07/15/2020 CALCIUM 10.7 (H) 12/03/2019    CALCIUM 10.6 10/28/2019    CALCIUM 10.5 08/15/2019    CALCIUM 10.8 (H) 06/13/2019    CALCIUM 10.4 12/20/2018     Orders Only on 03/07/2023   Component Date Value Ref Range Status    Vitamin B-12 03/07/2023 543  211 - 911 pg/mL Final    PTH 03/07/2023 181.9 (A)  14.0 - 72.0 pg/mL Final    Sodium 03/07/2023 143  136 - 145 mmol/L Final    Potassium 03/07/2023 4.0  3.5 - 5.1 mmol/L Final    Chloride 03/07/2023 105  99 - 110 mmol/L Final    CO2 03/07/2023 29  21 - 32 mmol/L Final    Anion Gap 03/07/2023 9  3 - 16 Final    Glucose 03/07/2023 88  70 - 99 mg/dL Final    BUN 03/07/2023 13  7 - 20 mg/dL Final    Creatinine 03/07/2023 0.6  0.6 - 1.2 mg/dL Final    Est, Glom Filt Rate 03/07/2023 >60  >60 Final    Comment: Pediatric calculator link  [email or web address]/kdoqi/gfr_calculatorped  Effective Oct 3, 2022  These results are not intended for use in patients  <25years of age. eGFR results are calculated without  a race factor using the 2021 CKD-EPI equation. Careful  clinical correlation is recommended, particularly when  comparing to results calculated using previous equations. The CKD-EPI equation is less accurate in patients with  extremes of muscle mass, extra-renal metabolism of  creatinine, excessive creatinine ingestion, or following  therapy that affects renal tubular secretion. Calcium 03/07/2023 11.1 (A)  8.3 - 10.6 mg/dL Final         Assessment/Plan      1. Hypercalcemia     Susi Rossi is a [de-identified] y.o. female was found to have a high calcium  .2  Most likely diagnosis is Primary hyperparathyroidism. She is on HCTZ 12.5 mg daily which can affect calcium levels. Repeat calcium level high with intact PTH. Phos 2.9   Vit D ( 25 hydroxy) 33.5      24 hr urine calcium is 206 which rules out [address]      No surgical indication at this point    Calcium 10.9 in 12/19  Ca 10.9 on 7/21  Ca 11.0   on 7/22    Will monitor calcium for now and check it every 6 months     Advised to keep herself hydrated. Will repeat calcium when she sees PCP. 2. Osteopenia. She is on alendronate 70 mg weekly  On it since 12/17    DEXA scan in 12/19 showed   Osteopenia of total hip with T score -1.8, Z score 0.1 and BMD 0.719. Osteopenia of femoral neck with T score -2.4, Z score -0.2 and BMD 0.582       Comparison with 2017 shows 10.0% increase in bone mineral density of spine and 2.0% decrease in bone mineral density of hip        Repeat DEXA scan in 1/22 shows osteopenia, 4 % decline at spine and stable at hip. Fosamax was continued   May consider drug holiday after next scan    3.  HTN   Managed by PCP

## 2023-03-21 DIAGNOSIS — I10 ESSENTIAL HYPERTENSION: ICD-10-CM

## 2023-03-21 RX ORDER — LOSARTAN POTASSIUM AND HYDROCHLOROTHIAZIDE 12.5; 1 MG/1; MG/1
TABLET ORAL
Qty: 90 TABLET | Refills: 1 | Status: SHIPPED | OUTPATIENT
Start: 2023-03-21

## 2023-03-21 RX ORDER — METOPROLOL SUCCINATE 100 MG/1
TABLET, EXTENDED RELEASE ORAL
Qty: 90 TABLET | Refills: 1 | Status: SHIPPED | OUTPATIENT
Start: 2023-03-21

## 2023-05-31 ENCOUNTER — TELEPHONE (OUTPATIENT)
Dept: INTERNAL MEDICINE CLINIC | Age: 83
End: 2023-05-31

## 2023-05-31 NOTE — TELEPHONE ENCOUNTER
Patient was scheduled for an AWV on 7/18, a day provider is not in office. Next available in September. Would it be possible to see patient sooner or should she be booked in September?     ----- Message from Kamilla Davenport sent at 5/31/2023 10:29 AM EDT -----  Subject: Appointment Request    Reason for Call: Established Patient Appointment needed: Routine Medicare   AWV    QUESTIONS    Reason for appointment request? Available appointments did not meet   patient need     Additional Information for Provider? Pt called to r/s upcoming Medicare   AWV 7/18 as requested by office. She would like to be seen in person. No   availability for ECC scheduling.  Please call her to schedule or advise.  ---------------------------------------------------------------------------  --------------  Jeny FRYE  [phone]; OK to leave message on voicemail  ---------------------------------------------------------------------------  --------------  SCRIPT ANSWERS  COVID Screen: Liv Leach

## 2023-07-11 ENCOUNTER — TELEPHONE (OUTPATIENT)
Dept: INTERNAL MEDICINE CLINIC | Age: 83
End: 2023-07-11

## 2023-07-11 NOTE — TELEPHONE ENCOUNTER
Patient called back and states she is still taking the losartan (1 per day) and she still has plenty until she sees Dr. Katarina Lennon in August.  She states she may have gotten a late start  with the medication would explain why she has more than insurance is reflecting at this time.

## 2023-07-11 NOTE — TELEPHONE ENCOUNTER
Left message on voicemail for return call. Received notification from patient's insurance that she overdue for filing her losartan-hctz. Called patient to verify she is taking as directed and see if she needs a refill.  Dispense report reflects that she should be out of medication (last filled 3/27/2023  #90)    Please obtain the information, when patient returns call

## 2023-08-01 ENCOUNTER — OFFICE VISIT (OUTPATIENT)
Dept: INTERNAL MEDICINE CLINIC | Age: 83
End: 2023-08-01

## 2023-08-01 VITALS
OXYGEN SATURATION: 97 % | RESPIRATION RATE: 14 BRPM | HEIGHT: 64 IN | BODY MASS INDEX: 28.17 KG/M2 | DIASTOLIC BLOOD PRESSURE: 80 MMHG | WEIGHT: 165 LBS | SYSTOLIC BLOOD PRESSURE: 136 MMHG | HEART RATE: 50 BPM

## 2023-08-01 DIAGNOSIS — E21.3 HYPERPARATHYROIDISM (HCC): ICD-10-CM

## 2023-08-01 DIAGNOSIS — I10 ESSENTIAL HYPERTENSION: ICD-10-CM

## 2023-08-01 DIAGNOSIS — Z00.00 MEDICARE ANNUAL WELLNESS VISIT, SUBSEQUENT: Primary | ICD-10-CM

## 2023-08-01 DIAGNOSIS — E53.8 B12 DEFICIENCY: ICD-10-CM

## 2023-08-01 DIAGNOSIS — E83.52 HYPERCALCEMIA: ICD-10-CM

## 2023-08-01 SDOH — ECONOMIC STABILITY: FOOD INSECURITY: WITHIN THE PAST 12 MONTHS, THE FOOD YOU BOUGHT JUST DIDN'T LAST AND YOU DIDN'T HAVE MONEY TO GET MORE.: NEVER TRUE

## 2023-08-01 SDOH — ECONOMIC STABILITY: INCOME INSECURITY: HOW HARD IS IT FOR YOU TO PAY FOR THE VERY BASICS LIKE FOOD, HOUSING, MEDICAL CARE, AND HEATING?: NOT HARD AT ALL

## 2023-08-01 SDOH — ECONOMIC STABILITY: FOOD INSECURITY: WITHIN THE PAST 12 MONTHS, YOU WORRIED THAT YOUR FOOD WOULD RUN OUT BEFORE YOU GOT MONEY TO BUY MORE.: NEVER TRUE

## 2023-08-01 ASSESSMENT — PATIENT HEALTH QUESTIONNAIRE - PHQ9
SUM OF ALL RESPONSES TO PHQ QUESTIONS 1-9: 0
2. FEELING DOWN, DEPRESSED OR HOPELESS: 0
1. LITTLE INTEREST OR PLEASURE IN DOING THINGS: 0
SUM OF ALL RESPONSES TO PHQ QUESTIONS 1-9: 0
SUM OF ALL RESPONSES TO PHQ QUESTIONS 1-9: 0
SUM OF ALL RESPONSES TO PHQ9 QUESTIONS 1 & 2: 0
SUM OF ALL RESPONSES TO PHQ QUESTIONS 1-9: 0

## 2023-08-01 ASSESSMENT — LIFESTYLE VARIABLES
HOW MANY STANDARD DRINKS CONTAINING ALCOHOL DO YOU HAVE ON A TYPICAL DAY: 1 OR 2
HOW OFTEN DO YOU HAVE A DRINK CONTAINING ALCOHOL: MONTHLY OR LESS

## 2023-08-01 NOTE — ASSESSMENT & PLAN NOTE
Slightly elevated today, but improved on second reading. She will make nurse appointment to calibrate cuff- if accurate she will monitor intermittently at home and call if consistently > 140/90. Continue current antihypertensive medications for now.

## 2023-08-01 NOTE — ASSESSMENT & PLAN NOTE
Asymptomatic, but slowly worsening with time. She will continue to avoid calcium supplements and excessive dietary intake and follow up with endocrinology, as directed.

## 2023-10-01 DIAGNOSIS — I10 ESSENTIAL HYPERTENSION: ICD-10-CM

## 2023-10-02 RX ORDER — LOSARTAN POTASSIUM AND HYDROCHLOROTHIAZIDE 12.5; 1 MG/1; MG/1
TABLET ORAL
Qty: 90 TABLET | Refills: 1 | Status: SHIPPED | OUTPATIENT
Start: 2023-10-02

## 2023-10-02 RX ORDER — METOPROLOL SUCCINATE 100 MG/1
TABLET, EXTENDED RELEASE ORAL
Qty: 90 TABLET | Refills: 1 | Status: SHIPPED | OUTPATIENT
Start: 2023-10-02

## 2024-02-08 DIAGNOSIS — I10 ESSENTIAL HYPERTENSION: ICD-10-CM

## 2024-02-08 RX ORDER — LOSARTAN POTASSIUM AND HYDROCHLOROTHIAZIDE 12.5; 1 MG/1; MG/1
1 TABLET ORAL DAILY
Qty: 30 TABLET | Refills: 5 | Status: SHIPPED | OUTPATIENT
Start: 2024-02-08

## 2024-02-08 RX ORDER — METOPROLOL SUCCINATE 100 MG/1
100 TABLET, EXTENDED RELEASE ORAL DAILY
Qty: 30 TABLET | Refills: 5 | Status: SHIPPED | OUTPATIENT
Start: 2024-02-08

## 2024-02-08 NOTE — TELEPHONE ENCOUNTER
Patient is calling in for  in regards to currently being in [place] and forgot her medication and is requesting a refill for at least 3 weeks for :    losartan-hydroCHLOROthiazide (HYZAAR) 100-12.5 MG per tablet  Last appointment: 8/1/2023  Next appointment: 3/5/2024  Last refill: 10/02/23      metoprolol succinate (TOPROL XL) 100 MG extended release tablet  Last appointment: 8/1/2023  Next appointment: 3/5/2024  Last refill: 10/02/23      Please send medication refill to :    [facility]/pharmacy #[id number] - [place], [place] - [address] - P [phone] - F [phone]  [phone]  Associate Signed OrdersProvidersCurrent Interactions

## 2024-02-14 DIAGNOSIS — I10 ESSENTIAL HYPERTENSION: ICD-10-CM

## 2024-02-14 RX ORDER — METOPROLOL SUCCINATE 100 MG/1
100 TABLET, EXTENDED RELEASE ORAL DAILY
Qty: 90 TABLET | Refills: 1 | Status: SHIPPED | OUTPATIENT
Start: 2024-02-14

## 2024-02-14 RX ORDER — LOSARTAN POTASSIUM AND HYDROCHLOROTHIAZIDE 12.5; 1 MG/1; MG/1
1 TABLET ORAL DAILY
Qty: 90 TABLET | Refills: 1 | Status: SHIPPED | OUTPATIENT
Start: 2024-02-14

## 2024-02-14 NOTE — TELEPHONE ENCOUNTER
Last appointment: 8/1/2023  Next appointment: 3/5/2024  Last refill: 02/08/2024 needs a 90 day supply

## 2024-02-29 NOTE — PROGRESS NOTES
Date of Visit:  3/5/2024    CC: Toya Strickland (: 1940) is a 83 y.o. female, established patient, here for evaluation/re-evaluation of the following medical concerns:    ASSESSMENT/PLAN:  Essential hypertension  Assessment & Plan:  Well-controlled.  Continue current antihypertensive regimen.    B12 deficiency  Assessment & Plan:  Asymptomatic except for mild paresthesias of feet, which may be unrelated. Will adjust supplement dose according to level.   Orders:  -     Vitamin B12; Future  Hyperparathyroidism (HCC)  Assessment & Plan:  Recent PTH higher. See plan for hypercalcemia.   Orders:  -     PTH, Intact; Future  Osteopenia of both thighs  Assessment & Plan:  Last dexa 22 showed slight decrease at spine, unchanged at hip on weekly alendronate. Continue vitamin D supplement at current dose and weight-bearing exercise.  Avoiding calcium supplements due to hypercalcemia. She will complete follow up dexa before endocrinology appointment later this month.  Hypercalcemia  Assessment & Plan:  Asymptomatic, recent calcium level slightly improved. She will continue to avoid calcium supplements and excessive dietary intake and follow up with endocrinology, on 3/20, as scheduled.       Return in about 6 months (around 2024).     Vitals:    24 1410   BP: 128/80   Site: Right Upper Arm   Position: Sitting   Cuff Size: Medium Adult   Pulse: (!) 48   SpO2: 99%   Weight: 73.5 kg (162 lb)        Estimated body mass index is 28.25 kg/m² as calculated from the following:    Height as of 23: 1.613 m (5' 3.5\").    Weight as of this encounter: 73.5 kg (162 lb).     Wt Readings from Last 3 Encounters:   24 73.5 kg (162 lb)   23 74.8 kg (165 lb)   03/15/23 77 kg (169 lb 12.8 oz)     BP Readings from Last 3 Encounters:   24 128/80   23 136/80   03/15/23 134/76     HPI  Hypertension:  Home blood pressure monitorins/low 80s.  She is adherent to a low sodium diet. Patient denies chest

## 2024-03-04 DIAGNOSIS — I10 ESSENTIAL HYPERTENSION: ICD-10-CM

## 2024-03-04 LAB
ALBUMIN SERPL-MCNC: 4.1 G/DL (ref 3.4–5)
ALBUMIN/GLOB SERPL: 1.9 {RATIO} (ref 1.1–2.2)
ALP SERPL-CCNC: 74 U/L (ref 40–129)
ALT SERPL-CCNC: 15 U/L (ref 10–40)
ANION GAP SERPL CALCULATED.3IONS-SCNC: 10 MMOL/L (ref 3–16)
AST SERPL-CCNC: 13 U/L (ref 15–37)
BILIRUB SERPL-MCNC: 0.5 MG/DL (ref 0–1)
BUN SERPL-MCNC: 20 MG/DL (ref 7–20)
CALCIUM SERPL-MCNC: 10.8 MG/DL (ref 8.3–10.6)
CHLORIDE SERPL-SCNC: 103 MMOL/L (ref 99–110)
CHOLEST SERPL-MCNC: 164 MG/DL (ref 0–199)
CO2 SERPL-SCNC: 29 MMOL/L (ref 21–32)
CREAT SERPL-MCNC: 0.7 MG/DL (ref 0.6–1.2)
GFR SERPLBLD CREATININE-BSD FMLA CKD-EPI: >60 ML/MIN/{1.73_M2}
GLUCOSE P FAST SERPL-MCNC: 85 MG/DL (ref 70–99)
HDLC SERPL-MCNC: 58 MG/DL (ref 40–60)
LDL CHOLESTEROL CALCULATED: 88 MG/DL
POTASSIUM SERPL-SCNC: 4.3 MMOL/L (ref 3.5–5.1)
PROT SERPL-MCNC: 6.3 G/DL (ref 6.4–8.2)
SODIUM SERPL-SCNC: 142 MMOL/L (ref 136–145)
TRIGL SERPL-MCNC: 91 MG/DL (ref 0–150)
TSH SERPL DL<=0.005 MIU/L-ACNC: 2.11 UIU/ML (ref 0.27–4.2)
VLDLC SERPL CALC-MCNC: 18 MG/DL

## 2024-03-05 ENCOUNTER — OFFICE VISIT (OUTPATIENT)
Dept: INTERNAL MEDICINE CLINIC | Age: 84
End: 2024-03-05
Payer: MEDICARE

## 2024-03-05 VITALS
BODY MASS INDEX: 28.25 KG/M2 | HEART RATE: 48 BPM | SYSTOLIC BLOOD PRESSURE: 128 MMHG | DIASTOLIC BLOOD PRESSURE: 80 MMHG | OXYGEN SATURATION: 99 % | WEIGHT: 162 LBS

## 2024-03-05 DIAGNOSIS — M85.852 OSTEOPENIA OF BOTH THIGHS: ICD-10-CM

## 2024-03-05 DIAGNOSIS — E21.3 HYPERPARATHYROIDISM (HCC): ICD-10-CM

## 2024-03-05 DIAGNOSIS — E53.8 B12 DEFICIENCY: ICD-10-CM

## 2024-03-05 DIAGNOSIS — E83.52 HYPERCALCEMIA: ICD-10-CM

## 2024-03-05 DIAGNOSIS — I10 ESSENTIAL HYPERTENSION: Primary | ICD-10-CM

## 2024-03-05 DIAGNOSIS — M85.851 OSTEOPENIA OF BOTH THIGHS: ICD-10-CM

## 2024-03-05 LAB
PTH-INTACT SERPL-MCNC: 212.7 PG/ML (ref 14–72)
VIT B12 SERPL-MCNC: 476 PG/ML (ref 211–911)

## 2024-03-05 PROCEDURE — 1090F PRES/ABSN URINE INCON ASSESS: CPT | Performed by: INTERNAL MEDICINE

## 2024-03-05 PROCEDURE — 1036F TOBACCO NON-USER: CPT | Performed by: INTERNAL MEDICINE

## 2024-03-05 PROCEDURE — 99214 OFFICE O/P EST MOD 30 MIN: CPT | Performed by: INTERNAL MEDICINE

## 2024-03-05 PROCEDURE — G8417 CALC BMI ABV UP PARAM F/U: HCPCS | Performed by: INTERNAL MEDICINE

## 2024-03-05 PROCEDURE — 1123F ACP DISCUSS/DSCN MKR DOCD: CPT | Performed by: INTERNAL MEDICINE

## 2024-03-05 PROCEDURE — G8427 DOCREV CUR MEDS BY ELIG CLIN: HCPCS | Performed by: INTERNAL MEDICINE

## 2024-03-05 PROCEDURE — 3079F DIAST BP 80-89 MM HG: CPT | Performed by: INTERNAL MEDICINE

## 2024-03-05 PROCEDURE — G8484 FLU IMMUNIZE NO ADMIN: HCPCS | Performed by: INTERNAL MEDICINE

## 2024-03-05 PROCEDURE — G8399 PT W/DXA RESULTS DOCUMENT: HCPCS | Performed by: INTERNAL MEDICINE

## 2024-03-05 PROCEDURE — 3074F SYST BP LT 130 MM HG: CPT | Performed by: INTERNAL MEDICINE

## 2024-03-05 RX ORDER — KETOCONAZOLE 20 MG/G
CREAM TOPICAL
Qty: 60 G | Refills: 1 | Status: SHIPPED | OUTPATIENT
Start: 2024-03-05

## 2024-03-05 ASSESSMENT — PATIENT HEALTH QUESTIONNAIRE - PHQ9
SUM OF ALL RESPONSES TO PHQ QUESTIONS 1-9: 0
2. FEELING DOWN, DEPRESSED OR HOPELESS: 0
SUM OF ALL RESPONSES TO PHQ9 QUESTIONS 1 & 2: 0
SUM OF ALL RESPONSES TO PHQ QUESTIONS 1-9: 0
1. LITTLE INTEREST OR PLEASURE IN DOING THINGS: 0
SUM OF ALL RESPONSES TO PHQ QUESTIONS 1-9: 0
SUM OF ALL RESPONSES TO PHQ QUESTIONS 1-9: 0

## 2024-03-06 NOTE — ASSESSMENT & PLAN NOTE
Asymptomatic, recent calcium level slightly improved. She will continue to avoid calcium supplements and excessive dietary intake and follow up with endocrinology, on 3/20, as scheduled.

## 2024-03-06 NOTE — ASSESSMENT & PLAN NOTE
Asymptomatic except for mild paresthesias of feet, which may be unrelated. Will adjust supplement dose according to level.

## 2024-03-06 NOTE — ASSESSMENT & PLAN NOTE
Last dexa 1/6/22 showed slight decrease at spine, unchanged at hip on weekly alendronate. Continue vitamin D supplement at current dose and weight-bearing exercise.  Avoiding calcium supplements due to hypercalcemia. She will complete follow up dexa before endocrinology appointment later this month.

## 2024-03-11 ENCOUNTER — HOSPITAL ENCOUNTER (OUTPATIENT)
Dept: GENERAL RADIOLOGY | Age: 84
Discharge: HOME | End: 2024-03-11
Attending: INTERNAL MEDICINE
Payer: MEDICARE

## 2024-03-11 DIAGNOSIS — M89.9 DISORDER OF BONE AND CARTILAGE: ICD-10-CM

## 2024-03-11 DIAGNOSIS — E21.3 HYPERPARATHYROIDISM (HCC): ICD-10-CM

## 2024-03-11 DIAGNOSIS — M94.9 DISORDER OF BONE AND CARTILAGE: ICD-10-CM

## 2024-03-11 DIAGNOSIS — M85.852 OSTEOPENIA OF NECK OF LEFT FEMUR: ICD-10-CM

## 2024-03-11 PROCEDURE — 77081 DXA BONE DENSITY APPENDICULR: CPT

## 2024-03-11 PROCEDURE — 77080 DXA BONE DENSITY AXIAL: CPT

## 2024-03-20 ENCOUNTER — OFFICE VISIT (OUTPATIENT)
Dept: ENDOCRINOLOGY | Age: 84
End: 2024-03-20
Payer: MEDICARE

## 2024-03-20 VITALS
SYSTOLIC BLOOD PRESSURE: 131 MMHG | RESPIRATION RATE: 14 BRPM | WEIGHT: 162 LBS | OXYGEN SATURATION: 97 % | DIASTOLIC BLOOD PRESSURE: 86 MMHG | BODY MASS INDEX: 27.66 KG/M2 | HEART RATE: 76 BPM | TEMPERATURE: 97 F | HEIGHT: 64 IN

## 2024-03-20 DIAGNOSIS — M89.9 DISORDER OF BONE AND CARTILAGE: ICD-10-CM

## 2024-03-20 DIAGNOSIS — E83.52 HYPERCALCEMIA: Primary | ICD-10-CM

## 2024-03-20 DIAGNOSIS — M85.852 OSTEOPENIA OF NECK OF LEFT FEMUR: ICD-10-CM

## 2024-03-20 DIAGNOSIS — M94.9 DISORDER OF BONE AND CARTILAGE: ICD-10-CM

## 2024-03-20 PROCEDURE — G8427 DOCREV CUR MEDS BY ELIG CLIN: HCPCS | Performed by: INTERNAL MEDICINE

## 2024-03-20 PROCEDURE — G8484 FLU IMMUNIZE NO ADMIN: HCPCS | Performed by: INTERNAL MEDICINE

## 2024-03-20 PROCEDURE — G2211 COMPLEX E/M VISIT ADD ON: HCPCS | Performed by: INTERNAL MEDICINE

## 2024-03-20 PROCEDURE — G8417 CALC BMI ABV UP PARAM F/U: HCPCS | Performed by: INTERNAL MEDICINE

## 2024-03-20 PROCEDURE — G8399 PT W/DXA RESULTS DOCUMENT: HCPCS | Performed by: INTERNAL MEDICINE

## 2024-03-20 PROCEDURE — 3079F DIAST BP 80-89 MM HG: CPT | Performed by: INTERNAL MEDICINE

## 2024-03-20 PROCEDURE — 1123F ACP DISCUSS/DSCN MKR DOCD: CPT | Performed by: INTERNAL MEDICINE

## 2024-03-20 PROCEDURE — 99213 OFFICE O/P EST LOW 20 MIN: CPT | Performed by: INTERNAL MEDICINE

## 2024-03-20 PROCEDURE — 1090F PRES/ABSN URINE INCON ASSESS: CPT | Performed by: INTERNAL MEDICINE

## 2024-03-20 PROCEDURE — 1036F TOBACCO NON-USER: CPT | Performed by: INTERNAL MEDICINE

## 2024-03-20 PROCEDURE — 3075F SYST BP GE 130 - 139MM HG: CPT | Performed by: INTERNAL MEDICINE

## 2024-03-20 NOTE — PROGRESS NOTES
- 5.0 g/dL Final    Albumin/Globulin Ratio 03/04/2024 1.9  1.1 - 2.2 Final    Total Bilirubin 03/04/2024 0.5  0.0 - 1.0 mg/dL Final    Alkaline Phosphatase 03/04/2024 74  40 - 129 U/L Final    ALT 03/04/2024 15  10 - 40 U/L Final    AST 03/04/2024 13 (L)  15 - 37 U/L Final    Cholesterol, Fasting 03/04/2024 164  0 - 199 mg/dL Final    Triglyceride, Fasting 03/04/2024 91  0 - 150 mg/dL Final    HDL 03/04/2024 58  40 - 60 mg/dL Final    LDL Calculated 03/04/2024 88  <100 mg/dL Final    VLDL Cholesterol Calculated 03/04/2024 18  Not Established mg/dL Final         Assessment/Plan      1. Hypercalcemia     Toya Strickland is a 83 y.o. female was found to have a high calcium  .2  Most likely diagnosis is Primary hyperparathyroidism.    She is on HCTZ 12.5 mg daily which can affect calcium levels.     Repeat calcium level high with intact PTH.  Phos 2.9   Vit D ( 25 hydroxy) 33.5      24 hr urine calcium is 206 which rules out [facility].     No surgical indication at this point    Calcium 10.9 in 12/19  Ca 10.9 on 7/21  Ca 11.0   on 7/22    Will monitor calcium for now and check it every 6 months     Advised to keep herself hydrated.     Will repeat calcium when she sees PCP.     Last stable    2. Osteopenia.     She is on alendronate 70 mg weekly  On it since 12/17    DEXA scan in 12/19 showed   Osteopenia of total hip with T score -1.8, Z score 0.1 and BMD 0.719. Osteopenia of femoral neck with T score -2.4, Z score -0.2 and BMD 0.582       Comparison with 2017 shows 10.0% increase in bone mineral density of spine and 2.0% decrease in bone mineral density of hip        Repeat DEXA scan in 1/22 shows osteopenia, 4 % decline at spine and stable at hip. Fosamax was continued     Dexa 3/24 shows osteopenia, 4% increase at spine. Stable at radius and hips  She is off fosamax since 9/23.   Monitor the BMD  Dexa in a year    3. HTN   Managed by PCP

## 2024-04-10 ENCOUNTER — TELEPHONE (OUTPATIENT)
Dept: INTERNAL MEDICINE CLINIC | Age: 84
End: 2024-04-10

## 2024-04-10 NOTE — TELEPHONE ENCOUNTER
Advised patient that Dr. Peterson is unable to take on any more new pts & Dr. Lopez is booked out to December

## 2024-04-10 NOTE — TELEPHONE ENCOUNTER
----- Message from Alexey Huber sent at 4/10/2024  1:55 PM EDT -----  Regarding: ECC Appointment Request  ECC Appointment Request    Patient needs appointment for ECC Appointment Type: New to Provider.    Reason for Appointment Request: No appointments available during search  Additional Information: The Patient wants Dr. Dorota Peterson to be her PCP permanently and wants to have an appointment for a Physical within the month of September in the morning.    --------------------------------------------------------------------------------------------------------------------------    Relationship to Patient: Self     Call Back Information: OK to leave message on voicemail  Preferred Call Back Number: [phone]

## 2024-08-12 DIAGNOSIS — I10 ESSENTIAL HYPERTENSION: ICD-10-CM

## 2024-08-12 NOTE — TELEPHONE ENCOUNTER
REFILL    metoprolol succinate (TOPROL XL) 100 MG extended release tablet     losartan-hydroCHLOROthiazide (HYZAAR) 100-12.5 MG per tablet     90 day supply on both     NTP on 09/18/24 with Dr. Tyler LOUIE [facility] - [facility], [place] [address] - P [phone] - F [phone] [[id number]]

## 2024-08-13 RX ORDER — LOSARTAN POTASSIUM AND HYDROCHLOROTHIAZIDE 12.5; 1 MG/1; MG/1
1 TABLET ORAL DAILY
Qty: 90 TABLET | Refills: 0 | Status: SHIPPED | OUTPATIENT
Start: 2024-08-13

## 2024-08-13 RX ORDER — METOPROLOL SUCCINATE 100 MG/1
100 TABLET, EXTENDED RELEASE ORAL DAILY
Qty: 90 TABLET | Refills: 0 | Status: SHIPPED | OUTPATIENT
Start: 2024-08-13

## 2024-10-30 ENCOUNTER — OFFICE VISIT (OUTPATIENT)
Dept: INTERNAL MEDICINE CLINIC | Age: 84
End: 2024-10-30

## 2024-10-30 VITALS
BODY MASS INDEX: 28.88 KG/M2 | SYSTOLIC BLOOD PRESSURE: 132 MMHG | DIASTOLIC BLOOD PRESSURE: 70 MMHG | HEIGHT: 63 IN | WEIGHT: 163 LBS

## 2024-10-30 DIAGNOSIS — I10 ESSENTIAL HYPERTENSION: ICD-10-CM

## 2024-10-30 DIAGNOSIS — E21.3 HYPERPARATHYROIDISM (HCC): ICD-10-CM

## 2024-10-30 DIAGNOSIS — E83.52 HYPERCALCEMIA: ICD-10-CM

## 2024-10-30 DIAGNOSIS — B35.3 ATHLETE'S FOOT ON RIGHT: ICD-10-CM

## 2024-10-30 DIAGNOSIS — Z00.00 MEDICARE ANNUAL WELLNESS VISIT, SUBSEQUENT: Primary | ICD-10-CM

## 2024-10-30 RX ORDER — LOSARTAN POTASSIUM 100 MG/1
100 TABLET ORAL DAILY
Qty: 90 TABLET | Refills: 1 | Status: SHIPPED | OUTPATIENT
Start: 2024-10-30

## 2024-10-30 RX ORDER — AMLODIPINE BESYLATE 5 MG/1
5 TABLET ORAL DAILY
Qty: 90 TABLET | Refills: 1 | Status: SHIPPED | OUTPATIENT
Start: 2024-10-30

## 2024-10-30 RX ORDER — METOPROLOL SUCCINATE 100 MG/1
100 TABLET, EXTENDED RELEASE ORAL DAILY
Qty: 90 TABLET | Refills: 3 | OUTPATIENT
Start: 2024-10-30

## 2024-10-30 RX ORDER — METOPROLOL SUCCINATE 100 MG/1
100 TABLET, EXTENDED RELEASE ORAL DAILY
Qty: 90 TABLET | Refills: 1 | Status: SHIPPED | OUTPATIENT
Start: 2024-10-30

## 2024-10-30 RX ORDER — KETOCONAZOLE 20 MG/G
CREAM TOPICAL
Qty: 60 G | Refills: 1 | Status: SHIPPED | OUTPATIENT
Start: 2024-10-30

## 2024-10-30 SDOH — ECONOMIC STABILITY: INCOME INSECURITY: HOW HARD IS IT FOR YOU TO PAY FOR THE VERY BASICS LIKE FOOD, HOUSING, MEDICAL CARE, AND HEATING?: NOT HARD AT ALL

## 2024-10-30 SDOH — ECONOMIC STABILITY: FOOD INSECURITY: WITHIN THE PAST 12 MONTHS, THE FOOD YOU BOUGHT JUST DIDN'T LAST AND YOU DIDN'T HAVE MONEY TO GET MORE.: NEVER TRUE

## 2024-10-30 SDOH — ECONOMIC STABILITY: FOOD INSECURITY: WITHIN THE PAST 12 MONTHS, YOU WORRIED THAT YOUR FOOD WOULD RUN OUT BEFORE YOU GOT MONEY TO BUY MORE.: NEVER TRUE

## 2024-10-30 ASSESSMENT — PATIENT HEALTH QUESTIONNAIRE - PHQ9
SUM OF ALL RESPONSES TO PHQ QUESTIONS 1-9: 0
SUM OF ALL RESPONSES TO PHQ QUESTIONS 1-9: 0
1. LITTLE INTEREST OR PLEASURE IN DOING THINGS: NOT AT ALL
SUM OF ALL RESPONSES TO PHQ QUESTIONS 1-9: 0
SUM OF ALL RESPONSES TO PHQ QUESTIONS 1-9: 0
2. FEELING DOWN, DEPRESSED OR HOPELESS: NOT AT ALL
SUM OF ALL RESPONSES TO PHQ9 QUESTIONS 1 & 2: 0

## 2024-10-30 ASSESSMENT — LIFESTYLE VARIABLES
HOW OFTEN DO YOU HAVE A DRINK CONTAINING ALCOHOL: NEVER
HOW MANY STANDARD DRINKS CONTAINING ALCOHOL DO YOU HAVE ON A TYPICAL DAY: PATIENT DOES NOT DRINK

## 2024-10-30 NOTE — ASSESSMENT & PLAN NOTE
Controlled on current regimen. Has hypercalcemia, possibly HCTZ contributing.  - stop HCTZ  - continue losartan 100 mg daily  - continue metoprolol 100mg daily  - start amlodipine 5mg daily

## 2024-10-30 NOTE — ASSESSMENT & PLAN NOTE
Difficult to treat. Was evaluated by podiatry, started on ketoconazole but it is not helping much.   - try preventative measures  - continue ketoconazole cream twice daily ~ 4 weeks  - if persists, recommend reaching out to podiatry to update them  -Reduce skin moisture (eg, use of socks with wick-away material  -Use of desiccating foot powders  -Treatment of shoes with ??tifu?gal powder  - Avoidance of occlusive footwear.

## 2024-10-30 NOTE — ASSESSMENT & PLAN NOTE
Asymptomatic, recent calcium level slightly improved.   - will stop hctz given this can cause hypercalcemia  - continue continue f/u with endocrinology  - avoid calcium supplements and excessive consumption  - BMP, PTH today

## 2024-10-30 NOTE — PROGRESS NOTES
Medicare Annual Wellness Visit    Toya Strickland is here for Medicare AWV    Assessment & Plan   Medicare annual wellness visit, subsequent  -     CBC with Auto Differential; Future  -     Comprehensive Metabolic Panel; Future  -     Hemoglobin A1C; Future  -     Lipid Panel; Future  -     TSH with Reflex; Future  -     Urinalysis with Microscopic; Future  -     Vitamin D 25 Hydroxy; Future  -     PTH, Intact; Future  Hyperparathyroidism (HCC)  -     Comprehensive Metabolic Panel; Future  -     Vitamin D 25 Hydroxy; Future  -     PTH, Intact; Future  Hypercalcemia  Assessment & Plan:  Asymptomatic, recent calcium level slightly improved.   - will stop hctz given this can cause hypercalcemia  - continue continue f/u with endocrinology  - avoid calcium supplements and excessive consumption  - BMP, PTH today  Essential hypertension  Assessment & Plan:  Controlled on current regimen. Has hypercalcemia, possibly HCTZ contributing.  - stop HCTZ  - continue losartan 100 mg daily  - continue metoprolol 100mg daily  - start amlodipine 5mg daily  Orders:  -     amLODIPine (NORVASC) 5 MG tablet; Take 1 tablet by mouth daily, Disp-90 tablet, R-1Normal  Athlete's foot on right  Assessment & Plan:  Difficult to treat. Was evaluated by podiatry, started on ketoconazole but it is not helping much.   - try preventative measures  - continue ketoconazole cream twice daily ~ 4 weeks  - if persists, recommend reaching out to podiatry to update them  -Reduce skin moisture (eg, use of socks with wick-away material  -Use of desiccating foot powders  -Treatment of shoes with ??tifu?gal powder  - Avoidance of occlusive footwear.   Recommendations for Preventive Services Due: see orders and patient instructions/AVS.  Recommended screening schedule for the next 5-10 years is provided to the patient in written form: see Patient Instructions/AVS.     Return in about 1 month (around 11/30/2024).     Subjective       Patient's complete Health Risk

## 2024-10-30 NOTE — PATIENT INSTRUCTIONS
devices to shoes.    Put grab bars and nonskid mats in your shower or tub and near the toilet. Try to use a shower chair or bath bench when bathing.   Get into a tub or shower by putting in your weaker leg first. Get out with your strong side first. Have a phone or medical alert device in the bathroom with you.   Where can you learn more?  Go to https://www.[employer].net/patientEd and enter G117 to learn more about \"Preventing Falls: Care Instructions.\"  Current as of: July 17, 2023  Content Version: 14.2  © 2024 [email or web address].   Care instructions adapted under license by [employer]. If you have questions about a medical condition or this instruction, always ask your healthcare professional. [employer] disclaims any warranty or liability for your use of this information.           Learning About Being Active as an Older Adult  Why is being active important as you get older?     Being active is one of the best things you can do for your health. And it's never too late to start. Being active--or getting active, if you aren't already--has definite benefits. It can:  Give you more energy,  Keep your mind sharp.  Improve balance to reduce your risk of falls.  Help you manage chronic illness with fewer medicines.  No matter how old you are, how fit you are, or what health problems you have, there is a form of activity that will work for you. And the more physical activity you can do, the better your overall health will be.  What kinds of activity can help you stay healthy?  Being more active will make your daily activities easier. Physical activity includes planned exercise and things you do in daily life. There are four types of activity:  Aerobic.  Doing aerobic activity makes your heart and lungs strong.  Includes walking, dancing, and gardening.  Aim for at least 2½ hours spread throughout the week.  It improves your energy and can help you sleep better.  Muscle-strengthening.  This type of

## 2024-11-07 DIAGNOSIS — Z00.00 MEDICARE ANNUAL WELLNESS VISIT, SUBSEQUENT: ICD-10-CM

## 2024-11-07 DIAGNOSIS — E21.3 HYPERPARATHYROIDISM (HCC): ICD-10-CM

## 2024-11-07 LAB
25(OH)D3 SERPL-MCNC: 25.7 NG/ML
ALBUMIN SERPL-MCNC: 4.1 G/DL (ref 3.4–5)
ALBUMIN/GLOB SERPL: 2 {RATIO} (ref 1.1–2.2)
ALP SERPL-CCNC: 71 U/L (ref 40–129)
ALT SERPL-CCNC: 16 U/L (ref 10–40)
ANION GAP SERPL CALCULATED.3IONS-SCNC: 6 MMOL/L (ref 3–16)
AST SERPL-CCNC: 14 U/L (ref 15–37)
BACTERIA URNS QL MICRO: ABNORMAL /HPF
BASOPHILS # BLD: 0 K/UL (ref 0–0.2)
BASOPHILS NFR BLD: 0.7 %
BILIRUB SERPL-MCNC: 0.5 MG/DL (ref 0–1)
BILIRUB UR QL STRIP.AUTO: NEGATIVE
BUN SERPL-MCNC: 20 MG/DL (ref 7–20)
CALCIUM SERPL-MCNC: 10.7 MG/DL (ref 8.3–10.6)
CHLORIDE SERPL-SCNC: 103 MMOL/L (ref 99–110)
CHOLEST SERPL-MCNC: 162 MG/DL (ref 0–199)
CLARITY UR: CLEAR
CO2 SERPL-SCNC: 30 MMOL/L (ref 21–32)
COLOR UR: YELLOW
CREAT SERPL-MCNC: 0.7 MG/DL (ref 0.6–1.2)
DEPRECATED RDW RBC AUTO: 14.1 % (ref 12.4–15.4)
EOSINOPHIL # BLD: 0.3 K/UL (ref 0–0.6)
EOSINOPHIL NFR BLD: 5.5 %
EPI CELLS #/AREA URNS AUTO: 1 /HPF (ref 0–5)
GFR SERPLBLD CREATININE-BSD FMLA CKD-EPI: 86 ML/MIN/{1.73_M2}
GLUCOSE SERPL-MCNC: 83 MG/DL (ref 70–99)
GLUCOSE UR STRIP.AUTO-MCNC: NEGATIVE MG/DL
HCT VFR BLD AUTO: 42.6 % (ref 36–48)
HDLC SERPL-MCNC: 63 MG/DL (ref 40–60)
HGB BLD-MCNC: 14.4 G/DL (ref 12–16)
HGB UR QL STRIP.AUTO: NEGATIVE
HYALINE CASTS #/AREA URNS AUTO: 0 /LPF (ref 0–8)
KETONES UR STRIP.AUTO-MCNC: NEGATIVE MG/DL
LDLC SERPL CALC-MCNC: 81 MG/DL
LEUKOCYTE ESTERASE UR QL STRIP.AUTO: ABNORMAL
LYMPHOCYTES # BLD: 1.5 K/UL (ref 1–5.1)
LYMPHOCYTES NFR BLD: 27.9 %
MCH RBC QN AUTO: 29.2 PG (ref 26–34)
MCHC RBC AUTO-ENTMCNC: 33.7 G/DL (ref 31–36)
MCV RBC AUTO: 86.5 FL (ref 80–100)
MONOCYTES # BLD: 0.3 K/UL (ref 0–1.3)
MONOCYTES NFR BLD: 5.5 %
NEUTROPHILS # BLD: 3.2 K/UL (ref 1.7–7.7)
NEUTROPHILS NFR BLD: 60.4 %
NITRITE UR QL STRIP.AUTO: NEGATIVE
PH UR STRIP.AUTO: 7.5 [PH] (ref 5–8)
PLATELET # BLD AUTO: 202 K/UL (ref 135–450)
PMV BLD AUTO: 9.3 FL (ref 5–10.5)
POTASSIUM SERPL-SCNC: 4 MMOL/L (ref 3.5–5.1)
PROT SERPL-MCNC: 6.2 G/DL (ref 6.4–8.2)
PROT UR STRIP.AUTO-MCNC: NEGATIVE MG/DL
PTH-INTACT SERPL-MCNC: 232 PG/ML (ref 14–72)
RBC # BLD AUTO: 4.93 M/UL (ref 4–5.2)
RBC CLUMPS #/AREA URNS AUTO: 1 /HPF (ref 0–4)
SODIUM SERPL-SCNC: 139 MMOL/L (ref 136–145)
SP GR UR STRIP.AUTO: 1.01 (ref 1–1.03)
TRIGL SERPL-MCNC: 89 MG/DL (ref 0–150)
TSH SERPL DL<=0.005 MIU/L-ACNC: 1.95 UIU/ML (ref 0.27–4.2)
UA DIPSTICK W REFLEX MICRO PNL UR: YES
URN SPEC COLLECT METH UR: ABNORMAL
UROBILINOGEN UR STRIP-ACNC: 0.2 E.U./DL
VLDLC SERPL CALC-MCNC: 18 MG/DL
WBC # BLD AUTO: 5.3 K/UL (ref 4–11)
WBC #/AREA URNS AUTO: 1 /HPF (ref 0–5)

## 2024-11-08 LAB
EST. AVERAGE GLUCOSE BLD GHB EST-MCNC: 108.3 MG/DL
HBA1C MFR BLD: 5.4 %

## 2024-12-03 ENCOUNTER — OFFICE VISIT (OUTPATIENT)
Dept: INTERNAL MEDICINE CLINIC | Age: 84
End: 2024-12-03

## 2024-12-03 VITALS
SYSTOLIC BLOOD PRESSURE: 132 MMHG | BODY MASS INDEX: 28.7 KG/M2 | HEIGHT: 63 IN | WEIGHT: 162 LBS | DIASTOLIC BLOOD PRESSURE: 70 MMHG

## 2024-12-03 DIAGNOSIS — E21.3 HYPERPARATHYROIDISM (HCC): Primary | ICD-10-CM

## 2024-12-03 DIAGNOSIS — B35.3 ATHLETE'S FOOT ON RIGHT: ICD-10-CM

## 2024-12-03 DIAGNOSIS — I10 ESSENTIAL HYPERTENSION: ICD-10-CM

## 2024-12-03 DIAGNOSIS — E83.52 HYPERCALCEMIA: ICD-10-CM

## 2024-12-03 NOTE — ASSESSMENT & PLAN NOTE
Controlled on current regimen. Has hypercalcemia, possibly HCTZ contributing which we stopped at last visit.  - continue losartan 100 mg daily  - continue metoprolol 100mg daily  - continue amlodipine 5mg daily

## 2024-12-03 NOTE — ASSESSMENT & PLAN NOTE
Asymptomatic, recent calcium level slightly improved.   - stopped hctz given this can cause hypercalcemia  - continue continue f/u with endocrinology  - avoid calcium supplements and excessive consumption  - BMP, PTH today

## 2024-12-03 NOTE — ASSESSMENT & PLAN NOTE
Difficult to treat. Was evaluated by podiatry, started on ketoconazole but it is not helping much. It has improved on treatment with ketoconazole.   - continue preventative measures  - if persists, recommend reaching out to podiatry to update them  -Reduce skin moisture (eg, use of socks with wick-away material  -Use of desiccating foot powders  -Treatment of shoes with ??tifu?gal powder  - Avoidance of occlusive footwear.

## 2024-12-03 NOTE — PROGRESS NOTES
this encounter: 1.6 m (5' 3\").    Weight as of this encounter: 73.5 kg (162 lb).    There are no preventive care reminders to display for this patient.     Physical Exam  Constitutional:       Appearance: Normal appearance. She is normal weight.   HENT:      Head: Normocephalic.      Mouth/Throat:      Mouth: Mucous membranes are moist.   Eyes:      General: No scleral icterus.     Extraocular Movements: Extraocular movements intact.      Conjunctiva/sclera: Conjunctivae normal.   Cardiovascular:      Rate and Rhythm: Normal rate and regular rhythm.      Pulses: Normal pulses.      Heart sounds: Normal heart sounds.   Pulmonary:      Effort: Pulmonary effort is normal.      Breath sounds: Normal breath sounds.   Abdominal:      General: There is no distension.      Palpations: Abdomen is soft.      Tenderness: There is no abdominal tenderness.   Musculoskeletal:         General: No swelling or tenderness.      Cervical back: Neck supple. No tenderness.   Lymphadenopathy:      Cervical: No cervical adenopathy.   Skin:     General: Skin is warm and dry.   Neurological:      Mental Status: She is oriented to person, place, and time. Mental status is at baseline.      Motor: No weakness.   Psychiatric:         Mood and Affect: Mood normal.         ASSESSMENT/PLAN:    Essential hypertension  Controlled on current regimen. Has hypercalcemia, possibly HCTZ contributing which we stopped at last visit.  - continue losartan 100 mg daily  - continue metoprolol 100mg daily  - continue amlodipine 5mg daily    Athlete's foot on right  Difficult to treat. Was evaluated by podiatry, started on ketoconazole but it is not helping much. It has improved on treatment with ketoconazole.   - continue preventative measures  - if persists, recommend reaching out to podiatry to update them  -Reduce skin moisture (eg, use of socks with wick-away material  -Use of desiccating foot powders  -Treatment of shoes with ??tifu?gal powder  -  Declines

## 2024-12-11 ENCOUNTER — TELEPHONE (OUTPATIENT)
Dept: INTERNAL MEDICINE CLINIC | Age: 84
End: 2024-12-11

## 2024-12-11 NOTE — TELEPHONE ENCOUNTER
Pt is having trouble with ankle swelling since the change of BP meds. Pt is req a call back    Please call and advise

## 2024-12-12 RX ORDER — CARVEDILOL PHOSPHATE 40 MG/1
40 CAPSULE, EXTENDED RELEASE ORAL
Qty: 90 CAPSULE | Refills: 1 | Status: SHIPPED | OUTPATIENT
Start: 2024-12-12 | End: 2024-12-16 | Stop reason: SDUPTHER

## 2024-12-12 NOTE — TELEPHONE ENCOUNTER
If patient's ankle swelling is bothering her, she can stop the amlodipine as this is very likely the cause of the ankle swelling.    Since we are stopping amlodipine, we will have to make another change to keep blood pressure controlled. Let's first try stopping the metoprolol and taking carvedilol instead. These are both similar (beta blockers), but metoprolol is not as effective.  I have sent over 40 mg of carvedilol extended release to be taken once daily instead of metoprolol.

## 2024-12-16 RX ORDER — CARVEDILOL PHOSPHATE 40 MG/1
40 CAPSULE, EXTENDED RELEASE ORAL
Qty: 90 CAPSULE | Refills: 1 | Status: SHIPPED | OUTPATIENT
Start: 2024-12-16

## 2024-12-16 NOTE — TELEPHONE ENCOUNTER
Pt called stated that her mail pharmacy never got script and just wanted to change it to a local pharmacy.       Script  pended

## 2025-01-24 NOTE — TELEPHONE ENCOUNTER
Pt is requesting to discuss her BP medication with either  or MA she just has a couple questions.     [phone]

## 2025-01-24 NOTE — TELEPHONE ENCOUNTER
Pt states she can not afford the carvedilol medication and her ankles are swelling she is requesting a different medication for this because her insurance will not cover carvedilol. She has one weeks worth left she is continuing to take.    [phone]

## 2025-01-26 NOTE — TELEPHONE ENCOUNTER
Please route med request for atenolol 50mg daily. She was previously on this prior to changing over to metoprolol in 2017. She was on a higher dose then, however. I would like her to start this lower dose and keep a blood pressure log at home x 2 weeks then make an appointment to come and see me. Each medication will have a different effect on her blood pressure and we need to make sure she is responding appropriately.

## 2025-01-27 RX ORDER — ATENOLOL 50 MG/1
50 TABLET ORAL DAILY
Qty: 90 TABLET | Refills: 1 | Status: SHIPPED | OUTPATIENT
Start: 2025-01-27

## 2025-01-28 RX ORDER — LOSARTAN POTASSIUM 100 MG/1
100 TABLET ORAL DAILY
Qty: 90 TABLET | Refills: 1 | Status: SHIPPED | OUTPATIENT
Start: 2025-01-28

## 2025-02-11 ENCOUNTER — OFFICE VISIT (OUTPATIENT)
Dept: INTERNAL MEDICINE CLINIC | Age: 85
End: 2025-02-11

## 2025-02-11 VITALS
DIASTOLIC BLOOD PRESSURE: 80 MMHG | SYSTOLIC BLOOD PRESSURE: 150 MMHG | HEIGHT: 63 IN | BODY MASS INDEX: 28.53 KG/M2 | WEIGHT: 161 LBS

## 2025-02-11 DIAGNOSIS — I10 ESSENTIAL HYPERTENSION: Primary | ICD-10-CM

## 2025-02-11 RX ORDER — ATENOLOL 50 MG/1
75 TABLET ORAL DAILY
Qty: 135 TABLET | Refills: 1 | Status: SHIPPED | OUTPATIENT
Start: 2025-02-11 | End: 2025-08-10

## 2025-02-11 SDOH — ECONOMIC STABILITY: FOOD INSECURITY: WITHIN THE PAST 12 MONTHS, THE FOOD YOU BOUGHT JUST DIDN'T LAST AND YOU DIDN'T HAVE MONEY TO GET MORE.: NEVER TRUE

## 2025-02-11 SDOH — ECONOMIC STABILITY: FOOD INSECURITY: WITHIN THE PAST 12 MONTHS, YOU WORRIED THAT YOUR FOOD WOULD RUN OUT BEFORE YOU GOT MONEY TO BUY MORE.: NEVER TRUE

## 2025-02-11 ASSESSMENT — PATIENT HEALTH QUESTIONNAIRE - PHQ9
1. LITTLE INTEREST OR PLEASURE IN DOING THINGS: NOT AT ALL
SUM OF ALL RESPONSES TO PHQ9 QUESTIONS 1 & 2: 0
SUM OF ALL RESPONSES TO PHQ QUESTIONS 1-9: 0
2. FEELING DOWN, DEPRESSED OR HOPELESS: NOT AT ALL

## 2025-02-11 NOTE — PROGRESS NOTES
2025    Toya Strickland (:  1940) is a 84 y.o. female, here for evaluation of the following medical concerns:    Chief Complaint   Patient presents with    Hypertension        HPI    Patient was previously on amlodipine, however this caused ankle swelling.  She stopped the amlodipine and then switched her metoprolol to carvedilol.  Patient had issues with cost of carvedilol and her ankles were swelling so she requests different medication.  She was put back on atenolol instead around 2 weeks ago.  She was previously on atenolol in 2017 until she was switched to the metoprolol.    Home BP log shows BP avg 140s/70s.     Prior to Visit Medications    Medication Sig Taking? Authorizing Provider   atenolol (TENORMIN) 50 MG tablet Take 1.5 tablets by mouth daily Yes Lucero Scott MD   losartan (COZAAR) 100 MG tablet Take 1 tablet by mouth daily Yes Lucero Scott MD   ketoconazole (NIZORAL) 2 % cream Apply to affected areas bid x6 weeks. Yes Lucero Scott MD   azelastine (ASTELIN) 0.1 % nasal spray 2 sprays by Nasal route 2 times daily Use in each nostril as directed Yes Grace Carbajal MD   vitamin B-12 (CYANOCOBALAMIN) 100 MCG tablet Take 1 tablet by mouth daily Yes Fan Paniagua MD   cetirizine (ZYRTEC ALLERGY) 10 MG tablet Take 1 tablet by mouth daily Yes Grace Carbajal MD   Cholecalciferol (VITAMIN D) 1000 UNIT TABS Take 1 tablet by mouth daily Yes ProviderFan MD        Allergies   Allergen Reactions    Amoxicillin-Pot Clavulanate Diarrhea       Past Medical History:   Diagnosis Date    Allergic rhinitis 2016    Hypertension     Low back pain     Medial meniscus tear     Left knee    Meningioma (HCC)     T11-12    Osteopenia        Past Surgical History:   Procedure Laterality Date    DILATION AND CURETTAGE OF UTERUS      HYSTERECTOMY, VAGINAL      LIPOMA RESECTION      Thigh, buttock    SHOULDER ARTHROSCOPY      Right subacromial decompression

## 2025-02-11 NOTE — ASSESSMENT & PLAN NOTE
Uncontrolled. Has hypercalcemia, possibly HCTZ contributing which we stopped.  Patient stopped amlodipine due to ankle swelling. Tried to switch from metoprolol to carvedilol for better control but this was cost-prohibitive. I switched to atenolol instead due to cost.  - continue losartan 100 mg daily  - increase atenolol from 50 to 75 mg daily  - patient to follow up with another BP log in 2 weeks and we will determine if we need to further increase dose

## 2025-02-24 NOTE — TELEPHONE ENCOUNTER
Last appointment: 6/29/2021  Next appointment: 1/6/2022  Last refill: 7/8/2021 Patient called has questions about MBB wants to know why she can't eat 2 hours prior to her procedure

## 2025-03-06 ENCOUNTER — OFFICE VISIT (OUTPATIENT)
Dept: INTERNAL MEDICINE CLINIC | Age: 85
End: 2025-03-06

## 2025-03-06 VITALS
HEIGHT: 63 IN | DIASTOLIC BLOOD PRESSURE: 68 MMHG | WEIGHT: 163 LBS | SYSTOLIC BLOOD PRESSURE: 128 MMHG | BODY MASS INDEX: 28.88 KG/M2

## 2025-03-06 DIAGNOSIS — E83.52 HYPERCALCEMIA: ICD-10-CM

## 2025-03-06 DIAGNOSIS — R60.0 LOWER LEG EDEMA: ICD-10-CM

## 2025-03-06 DIAGNOSIS — I10 ESSENTIAL HYPERTENSION: Primary | ICD-10-CM

## 2025-03-06 DIAGNOSIS — E21.3 HYPERPARATHYROIDISM: ICD-10-CM

## 2025-03-06 LAB
25(OH)D3 SERPL-MCNC: 29.4 NG/ML
ALBUMIN SERPL-MCNC: 4.2 G/DL (ref 3.4–5)
ALBUMIN/GLOB SERPL: 1.9 {RATIO} (ref 1.1–2.2)
ALP SERPL-CCNC: 85 U/L (ref 40–129)
ALT SERPL-CCNC: 22 U/L (ref 10–40)
ANION GAP SERPL CALCULATED.3IONS-SCNC: 7 MMOL/L (ref 3–16)
AST SERPL-CCNC: 15 U/L (ref 15–37)
BILIRUB SERPL-MCNC: 0.4 MG/DL (ref 0–1)
BUN SERPL-MCNC: 19 MG/DL (ref 7–20)
CALCIUM SERPL-MCNC: 11 MG/DL (ref 8.3–10.6)
CHLORIDE SERPL-SCNC: 103 MMOL/L (ref 99–110)
CO2 SERPL-SCNC: 30 MMOL/L (ref 21–32)
CREAT SERPL-MCNC: 0.8 MG/DL (ref 0.6–1.2)
GFR SERPLBLD CREATININE-BSD FMLA CKD-EPI: 73 ML/MIN/{1.73_M2}
GLUCOSE SERPL-MCNC: 76 MG/DL (ref 70–99)
POTASSIUM SERPL-SCNC: 4.4 MMOL/L (ref 3.5–5.1)
PROT SERPL-MCNC: 6.4 G/DL (ref 6.4–8.2)
PTH-INTACT SERPL-MCNC: 271 PG/ML (ref 14–72)
SODIUM SERPL-SCNC: 140 MMOL/L (ref 136–145)

## 2025-03-06 RX ORDER — FUROSEMIDE 20 MG/1
20 TABLET ORAL DAILY PRN
Qty: 90 TABLET | Refills: 1 | Status: SHIPPED | OUTPATIENT
Start: 2025-03-06 | End: 2025-09-02

## 2025-03-06 NOTE — ASSESSMENT & PLAN NOTE
This is been an issue since coming off of HCTZ due to hypercalcemia.  Swelling is very mild on exam.  - Try compression socks first  - If compression socks do not provide enough relief, okay to try 20 mg Lasix daily as needed

## 2025-03-06 NOTE — PROGRESS NOTES
enough relief, okay to try 20 mg Lasix daily as needed       Orders Placed This Encounter    furosemide (LASIX) 20 MG tablet     Sig: Take 1 tablet by mouth daily as needed (leg swelling) Check blood pressure prior to taking medication     Dispense:  90 tablet     Refill:  1        Return in about 6 months (around 9/6/2025) for annual physical.

## 2025-03-06 NOTE — ASSESSMENT & PLAN NOTE
Controlled. Has hypercalcemia, possibly HCTZ contributing which we stopped.  Patient stopped amlodipine due to ankle swelling. Tried to switch from metoprolol to carvedilol for better control but this was cost-prohibitive. I switched to atenolol instead due to cost.  - continue losartan 100 mg daily  - continue atenolol 75 mg daily

## 2025-03-13 ENCOUNTER — RESULTS FOLLOW-UP (OUTPATIENT)
Dept: INTERNAL MEDICINE CLINIC | Age: 85
End: 2025-03-13

## 2025-03-18 ENCOUNTER — HOSPITAL ENCOUNTER (OUTPATIENT)
Dept: GENERAL RADIOLOGY | Age: 85
Discharge: HOME | End: 2025-03-18
Attending: INTERNAL MEDICINE
Payer: MEDICARE

## 2025-03-18 DIAGNOSIS — M85.852 OSTEOPENIA OF NECK OF LEFT FEMUR: ICD-10-CM

## 2025-03-18 DIAGNOSIS — E83.52 HYPERCALCEMIA: ICD-10-CM

## 2025-03-18 DIAGNOSIS — M94.9 DISORDER OF BONE AND CARTILAGE: ICD-10-CM

## 2025-03-18 DIAGNOSIS — M89.9 DISORDER OF BONE AND CARTILAGE: ICD-10-CM

## 2025-03-18 PROCEDURE — 77081 DXA BONE DENSITY APPENDICULR: CPT

## 2025-03-18 PROCEDURE — 77080 DXA BONE DENSITY AXIAL: CPT

## 2025-03-24 ENCOUNTER — OFFICE VISIT (OUTPATIENT)
Dept: ENDOCRINOLOGY | Age: 85
End: 2025-03-24
Payer: MEDICARE

## 2025-03-24 VITALS
OXYGEN SATURATION: 96 % | WEIGHT: 160 LBS | HEART RATE: 53 BPM | RESPIRATION RATE: 15 BRPM | DIASTOLIC BLOOD PRESSURE: 77 MMHG | SYSTOLIC BLOOD PRESSURE: 144 MMHG | BODY MASS INDEX: 28.34 KG/M2

## 2025-03-24 DIAGNOSIS — M81.0 AGE-RELATED OSTEOPOROSIS WITHOUT CURRENT PATHOLOGICAL FRACTURE: Primary | ICD-10-CM

## 2025-03-24 DIAGNOSIS — E83.52 HYPERCALCEMIA: ICD-10-CM

## 2025-03-24 PROCEDURE — 1090F PRES/ABSN URINE INCON ASSESS: CPT | Performed by: INTERNAL MEDICINE

## 2025-03-24 PROCEDURE — 99214 OFFICE O/P EST MOD 30 MIN: CPT | Performed by: INTERNAL MEDICINE

## 2025-03-24 PROCEDURE — G2211 COMPLEX E/M VISIT ADD ON: HCPCS | Performed by: INTERNAL MEDICINE

## 2025-03-24 PROCEDURE — 1159F MED LIST DOCD IN RCRD: CPT | Performed by: INTERNAL MEDICINE

## 2025-03-24 PROCEDURE — 1036F TOBACCO NON-USER: CPT | Performed by: INTERNAL MEDICINE

## 2025-03-24 PROCEDURE — 3077F SYST BP >= 140 MM HG: CPT | Performed by: INTERNAL MEDICINE

## 2025-03-24 PROCEDURE — 1123F ACP DISCUSS/DSCN MKR DOCD: CPT | Performed by: INTERNAL MEDICINE

## 2025-03-24 PROCEDURE — G8417 CALC BMI ABV UP PARAM F/U: HCPCS | Performed by: INTERNAL MEDICINE

## 2025-03-24 PROCEDURE — G8399 PT W/DXA RESULTS DOCUMENT: HCPCS | Performed by: INTERNAL MEDICINE

## 2025-03-24 PROCEDURE — 3078F DIAST BP <80 MM HG: CPT | Performed by: INTERNAL MEDICINE

## 2025-03-24 PROCEDURE — G8427 DOCREV CUR MEDS BY ELIG CLIN: HCPCS | Performed by: INTERNAL MEDICINE

## 2025-03-24 RX ORDER — ALENDRONATE SODIUM 70 MG/1
70 TABLET ORAL
Qty: 4 TABLET | Refills: 3 | Status: SHIPPED | OUTPATIENT
Start: 2025-03-24

## 2025-03-24 NOTE — PROGRESS NOTES
NARENDRA Strickland is a 84 y.o. female who was seen for management of hypercalcemia.     Interim:    No fractures, stable    She stopped fosamax 9/23    Has seen Dr. Patel    PCP :  Lucero Scott MD       Patient has a PMH of HTN, osteopenia, hypercalcemia.     Patient was found to have high calcium in 06/18  Calcium was 10.7    Repeat calcium 10.8 in 06/19    Intact PTH level was 119      No History of kidney stones    She has h/o osteopenia.  She is on alendronate 70 mg weekly since 2019.       FH of hypercalcemia: No    Dietary ca intake : 600-900 mg daily.   Supplemental calcium: She was on calcium 500 mg daily. Stopped in 06/19    Supplemental Vitamin D: 1000 IU daily.     Mild, controlled, no worsening factors    Dexa 2017        L Spine (L 1-4): BMD= 0.916 g/cm2, T-score= -1.2, Z-score= 1.2,   %Change = -1.0%       L Hip: BMD= 0.734 g/cm2, T-score= -1.7, Z-score= 0.1, %Change =   -2.7%   L Femoral Neck: BMD= 0.597 g/cm2, T-score= -2.3, Z-score= -0.2           FRAX REPORT (WHO 10 Year Fracture Risk Assessment):         Hip Fracture Risk = 4.0%, Major Osteoporotic Fracture= 14%     Dexa 1/22  Lumbar Spine : BMD is 0.962 and T score is - 0.8 which is in the range of normal. This corresponds to 4% worsening since 2019.       Left femoral neck : BMD is 0.602 and T score is - 2.2 which is in the range of osteopenia.     Left hip : BMD is 0.735 and T score is - 1.7 which is in the range of osteopenia.  No significant change since 2019.         Dexa 3/24    L Spine (L 1-4):  BMD= 0.998 g/cm2, T-score= -0.4, Z-score= 2.3. This indicates  a 4% improvement from prior scan 1/27/2022.     L Hip:  BMD= 0.743 g/cm2, T-score= -1.6, Z-score= 0.5.    L Femoral Neck:  BMD= 0.618 g/cm2, T-score= -2.1, Z-score= 0.3. This indicates a  1% improvement from prior scan 1/27/2022.     Radius 1/3:  BMD= 0.558 g/cm2, T-score= -2.3, Z-score= 1.0. This indicates a 3%  improvement from prior scan 12/10/2019.    Dexa

## 2025-03-27 RX ORDER — LOSARTAN POTASSIUM 100 MG/1
100 TABLET ORAL DAILY
Qty: 90 TABLET | Refills: 1 | Status: SHIPPED | OUTPATIENT
Start: 2025-03-27

## 2025-03-27 RX ORDER — ATENOLOL 50 MG/1
75 TABLET ORAL DAILY
Qty: 135 TABLET | Refills: 1 | Status: SHIPPED | OUTPATIENT
Start: 2025-03-27

## 2025-03-27 NOTE — TELEPHONE ENCOUNTER
Please double check with the patient is taking.  On her med list it says 1.5 tablets of atenolol, but on the requested med it says 1 tablet.

## 2025-03-29 ENCOUNTER — RESULTS FOLLOW-UP (OUTPATIENT)
Dept: INTERNAL MEDICINE CLINIC | Age: 85
End: 2025-03-29

## 2025-04-15 DIAGNOSIS — M81.0 AGE-RELATED OSTEOPOROSIS WITHOUT CURRENT PATHOLOGICAL FRACTURE: Primary | ICD-10-CM

## 2025-04-15 RX ORDER — ALENDRONATE SODIUM 70 MG/1
70 TABLET ORAL
Qty: 12 TABLET | Refills: 11 | Status: SHIPPED | OUTPATIENT
Start: 2025-04-15

## 2025-04-15 NOTE — TELEPHONE ENCOUNTER
Medication:   Requested Prescriptions     Pending Prescriptions Disp Refills    alendronate (FOSAMAX) 70 MG tablet 12 tablet 11     Sig: Take 1 tablet by mouth every 7 days Take once per week in the morning with a full glass of water, on an empty stomach, and do not take anything else by mouth or lie down for the next 30 minutes.       Last Filled:      Patient Phone Number: [phone] (home)     Last appt: 3/24/2025   Next appt: 3/24/26    Last Labs DM:   Lab Results   Component Value Date/Time    VITD25 29.4 03/06/2025 10:31 AM    VITD25 25.7 11/07/2024 08:15 AM

## 2025-04-28 ENCOUNTER — HOSPITAL ENCOUNTER (OUTPATIENT)
Dept: GENERAL RADIOLOGY | Age: 85
Discharge: HOME | End: 2025-04-28
Payer: MEDICARE

## 2025-04-28 ENCOUNTER — OFFICE VISIT (OUTPATIENT)
Dept: FAMILY MEDICINE CLINIC | Age: 85
End: 2025-04-28
Payer: MEDICARE

## 2025-04-28 VITALS
BODY MASS INDEX: 28.35 KG/M2 | SYSTOLIC BLOOD PRESSURE: 148 MMHG | DIASTOLIC BLOOD PRESSURE: 80 MMHG | WEIGHT: 160 LBS | OXYGEN SATURATION: 94 % | HEART RATE: 70 BPM | HEIGHT: 63 IN

## 2025-04-28 DIAGNOSIS — J18.9 PNEUMONIA OF BOTH LUNGS DUE TO INFECTIOUS ORGANISM, UNSPECIFIED PART OF LUNG: ICD-10-CM

## 2025-04-28 DIAGNOSIS — J18.9 PNEUMONIA OF BOTH LUNGS DUE TO INFECTIOUS ORGANISM, UNSPECIFIED PART OF LUNG: Primary | ICD-10-CM

## 2025-04-28 PROCEDURE — 1123F ACP DISCUSS/DSCN MKR DOCD: CPT | Performed by: NURSE PRACTITIONER

## 2025-04-28 PROCEDURE — 3079F DIAST BP 80-89 MM HG: CPT | Performed by: NURSE PRACTITIONER

## 2025-04-28 PROCEDURE — G8417 CALC BMI ABV UP PARAM F/U: HCPCS | Performed by: NURSE PRACTITIONER

## 2025-04-28 PROCEDURE — G8399 PT W/DXA RESULTS DOCUMENT: HCPCS | Performed by: NURSE PRACTITIONER

## 2025-04-28 PROCEDURE — 1090F PRES/ABSN URINE INCON ASSESS: CPT | Performed by: NURSE PRACTITIONER

## 2025-04-28 PROCEDURE — 1159F MED LIST DOCD IN RCRD: CPT | Performed by: NURSE PRACTITIONER

## 2025-04-28 PROCEDURE — 3077F SYST BP >= 140 MM HG: CPT | Performed by: NURSE PRACTITIONER

## 2025-04-28 PROCEDURE — 99214 OFFICE O/P EST MOD 30 MIN: CPT | Performed by: NURSE PRACTITIONER

## 2025-04-28 PROCEDURE — 71046 X-RAY EXAM CHEST 2 VIEWS: CPT

## 2025-04-28 PROCEDURE — G8427 DOCREV CUR MEDS BY ELIG CLIN: HCPCS | Performed by: NURSE PRACTITIONER

## 2025-04-28 PROCEDURE — 1036F TOBACCO NON-USER: CPT | Performed by: NURSE PRACTITIONER

## 2025-04-28 RX ORDER — AZITHROMYCIN 250 MG/1
TABLET, FILM COATED ORAL
Qty: 6 TABLET | Refills: 0 | Status: SHIPPED | OUTPATIENT
Start: 2025-04-28 | End: 2025-05-08

## 2025-04-28 RX ORDER — PREDNISONE 20 MG/1
20 TABLET ORAL 2 TIMES DAILY
Qty: 10 TABLET | Refills: 0 | Status: SHIPPED | OUTPATIENT
Start: 2025-04-28 | End: 2025-05-03

## 2025-04-28 RX ORDER — AZITHROMYCIN 250 MG/1
TABLET, FILM COATED ORAL
Qty: 6 TABLET | Refills: 0 | Status: SHIPPED | OUTPATIENT
Start: 2025-04-28 | End: 2025-04-28

## 2025-04-28 RX ORDER — PREDNISONE 20 MG/1
20 TABLET ORAL 2 TIMES DAILY
Qty: 10 TABLET | Refills: 0 | Status: SHIPPED | OUTPATIENT
Start: 2025-04-28 | End: 2025-04-28

## 2025-04-28 ASSESSMENT — PATIENT HEALTH QUESTIONNAIRE - PHQ9
SUM OF ALL RESPONSES TO PHQ QUESTIONS 1-9: 0
2. FEELING DOWN, DEPRESSED OR HOPELESS: NOT AT ALL
SUM OF ALL RESPONSES TO PHQ QUESTIONS 1-9: 0
1. LITTLE INTEREST OR PLEASURE IN DOING THINGS: NOT AT ALL

## 2025-04-28 NOTE — PROGRESS NOTES
patient were advised that Artificial Intelligence will be utilized during this visit to record, process the conversation to generate a clinical note, and support improvement of the AI technology. The patient (or guardian, if applicable) and other individuals in attendance at the appointment consented to the use of AI, including the recording.          An electronic signature was used to authenticate this note.    --CAROLIN NICHOLSNO, APRN - CNP

## 2025-06-04 ENCOUNTER — OFFICE VISIT (OUTPATIENT)
Dept: FAMILY MEDICINE CLINIC | Age: 85
End: 2025-06-04

## 2025-06-04 VITALS
HEIGHT: 63 IN | SYSTOLIC BLOOD PRESSURE: 138 MMHG | TEMPERATURE: 98.5 F | OXYGEN SATURATION: 96 % | HEART RATE: 56 BPM | DIASTOLIC BLOOD PRESSURE: 80 MMHG | BODY MASS INDEX: 27.64 KG/M2 | WEIGHT: 156 LBS

## 2025-06-04 DIAGNOSIS — M81.0 AGE-RELATED OSTEOPOROSIS WITHOUT CURRENT PATHOLOGICAL FRACTURE: Primary | ICD-10-CM

## 2025-06-04 DIAGNOSIS — J30.1 SEASONAL ALLERGIC RHINITIS DUE TO POLLEN: ICD-10-CM

## 2025-06-04 DIAGNOSIS — I10 ESSENTIAL HYPERTENSION: ICD-10-CM

## 2025-06-04 DIAGNOSIS — E21.3 HYPERPARATHYROIDISM: ICD-10-CM

## 2025-06-04 RX ORDER — LOSARTAN POTASSIUM 100 MG/1
100 TABLET ORAL DAILY
Qty: 90 TABLET | Refills: 1 | Status: SHIPPED | OUTPATIENT
Start: 2025-06-04

## 2025-06-04 RX ORDER — AZELASTINE 1 MG/ML
2 SPRAY, METERED NASAL 2 TIMES DAILY
Qty: 60 ML | Refills: 5 | Status: SHIPPED | OUTPATIENT
Start: 2025-06-04

## 2025-06-04 ASSESSMENT — ENCOUNTER SYMPTOMS
BACK PAIN: 0
ABDOMINAL PAIN: 0
COUGH: 0
CONSTIPATION: 0
SINUS PAIN: 0
WHEEZING: 0
COLOR CHANGE: 0
SINUS PRESSURE: 0
SHORTNESS OF BREATH: 0
DIARRHEA: 0

## 2025-06-04 NOTE — ASSESSMENT & PLAN NOTE
Monitored by specialist- no acute findings meriting change in the plan. Restarted Fosamax.  Reviewed recent DEXA scan.  Continue calcium and vitamin D supplement.  Following with endocrinology.

## 2025-06-04 NOTE — PROGRESS NOTES
Toya Strickland (:  1940) is a 84 y.o. female,Established patient, here for evaluation of the following chief complaint(s):  Follow-up      ASSESSMENT/PLAN:  Assessment & Plan  Age-related osteoporosis without current pathological fracture   Monitored by specialist- no acute findings meriting change in the plan. Restarted Fosamax.  Reviewed recent DEXA scan.  Continue calcium and vitamin D supplement.  Following with endocrinology.  Essential hypertension   Chronic, at goal (stable), continue losartan and atenolol   Hyperparathyroidism   Monitored by specialist- no acute findings meriting change in the plan  Seasonal allergic rhinitis due to pollen   Chronic, not at goal (unstable), restart astelin      Patient's current PCP is on extended leave.  In her absence, I will be helping to manage patient's chronic conditions until her return.      No follow-ups on file.    SUBJECTIVE/OBJECTIVE:    History of Present Illness  The patient is an 84-year-old female who presents for a follow-up visit.    She reports a persistent sensation of fullness in her ears and has not been utilizing Astelin nasal spray as previously prescribed.     Her blood pressure remains relatively stable, with minor fluctuations. She is currently on atenolol and losartan for blood pressure management. She requests a refill of her losartan prescription.    She is taking vitamin B12 every other day and inquires if it is okay.    She had a DEXA scan earlier this year and restarted Fosamax for osteoporosis. Following with endocrinology for hyperparathyroid. Was previously on Fosamax and was on drug holiday. This was restarted due to worsening bone density.       Current Outpatient Medications   Medication Sig Dispense Refill    azelastine (ASTELIN) 0.1 % nasal spray 2 sprays by Nasal route 2 times daily Use in each nostril as directed 60 mL 5    losartan (COZAAR) 100 MG tablet Take 1 tablet by mouth daily 90 tablet 1    alendronate (FOSAMAX) 70

## 2025-07-06 DIAGNOSIS — M81.0 AGE-RELATED OSTEOPOROSIS WITHOUT CURRENT PATHOLOGICAL FRACTURE: ICD-10-CM

## 2025-07-07 RX ORDER — ALENDRONATE SODIUM 70 MG/1
70 TABLET ORAL
Qty: 12 TABLET | Refills: 1 | Status: SHIPPED | OUTPATIENT
Start: 2025-07-07